# Patient Record
Sex: MALE | Race: WHITE | Employment: FULL TIME | URBAN - NONMETROPOLITAN AREA
[De-identification: names, ages, dates, MRNs, and addresses within clinical notes are randomized per-mention and may not be internally consistent; named-entity substitution may affect disease eponyms.]

---

## 2018-11-09 ENCOUNTER — HOSPITAL ENCOUNTER (OUTPATIENT)
Age: 53
Discharge: HOME | End: 2018-11-09
Payer: COMMERCIAL

## 2018-11-09 LAB
ALBUMIN SERPL-MCNC: 4.7 G/DL (ref 3.5–5.1)
ALP BLD-CCNC: 33 U/L (ref 38–126)
ALT SERPL-CCNC: 55 U/L (ref 11–66)
ANION GAP SERPL CALCULATED.3IONS-SCNC: 13 MEQ/L (ref 8–16)
AST SERPL-CCNC: 34 U/L (ref 5–40)
BASOPHILS # BLD: 0.6 %
BASOPHILS ABSOLUTE: 0 THOU/MM3 (ref 0–0.1)
BILIRUB SERPL-MCNC: 0.4 MG/DL (ref 0.3–1.2)
BUN BLDV-MCNC: 14 MG/DL (ref 7–22)
CALCIUM SERPL-MCNC: 9.5 MG/DL (ref 8.5–10.5)
CHLORIDE BLD-SCNC: 98 MEQ/L (ref 98–111)
CHOLESTEROL, TOTAL: 208 MG/DL (ref 100–199)
CO2: 28 MEQ/L (ref 23–33)
CREAT SERPL-MCNC: 0.9 MG/DL (ref 0.4–1.2)
EOSINOPHIL # BLD: 3.1 %
EOSINOPHILS ABSOLUTE: 0.2 THOU/MM3 (ref 0–0.4)
ERYTHROCYTE [DISTWIDTH] IN BLOOD BY AUTOMATED COUNT: 13 % (ref 11.5–14.5)
ERYTHROCYTE [DISTWIDTH] IN BLOOD BY AUTOMATED COUNT: 39.7 FL (ref 35–45)
GFR SERPL CREATININE-BSD FRML MDRD: 89 ML/MIN/1.73M2
GLUCOSE BLD-MCNC: 102 MG/DL (ref 70–108)
HCT VFR BLD CALC: 45.2 % (ref 42–52)
HDLC SERPL-MCNC: 43 MG/DL
HEMOGLOBIN: 15.1 GM/DL (ref 14–18)
IMMATURE GRANS (ABS): 0.03 THOU/MM3 (ref 0–0.07)
IMMATURE GRANULOCYTES: 0.5 %
LDL CHOLESTEROL CALCULATED: 122 MG/DL
LYMPHOCYTES # BLD: 27 %
LYMPHOCYTES ABSOLUTE: 1.7 THOU/MM3 (ref 1–4.8)
MCH RBC QN AUTO: 28.2 PG (ref 26–33)
MCHC RBC AUTO-ENTMCNC: 33.4 GM/DL (ref 32.2–35.5)
MCV RBC AUTO: 84.5 FL (ref 80–94)
MONOCYTES # BLD: 6.3 %
MONOCYTES ABSOLUTE: 0.4 THOU/MM3 (ref 0.4–1.3)
NUCLEATED RED BLOOD CELLS: 0 /100 WBC
PLATELET # BLD: 285 THOU/MM3 (ref 130–400)
PMV BLD AUTO: 10.4 FL (ref 9.4–12.4)
POTASSIUM SERPL-SCNC: 4.8 MEQ/L (ref 3.5–5.2)
RBC # BLD: 5.35 MILL/MM3 (ref 4.7–6.1)
SEG NEUTROPHILS: 62.5 %
SEGMENTED NEUTROPHILS ABSOLUTE COUNT: 4 THOU/MM3 (ref 1.8–7.7)
SODIUM BLD-SCNC: 139 MEQ/L (ref 135–145)
TOTAL PROTEIN: 7.8 G/DL (ref 6.1–8)
TRIGL SERPL-MCNC: 217 MG/DL (ref 0–199)
WBC # BLD: 6.4 THOU/MM3 (ref 4.8–10.8)

## 2018-11-09 PROCEDURE — 85025 COMPLETE CBC W/AUTO DIFF WBC: CPT

## 2018-11-09 PROCEDURE — 80053 COMPREHEN METABOLIC PANEL: CPT

## 2018-11-09 PROCEDURE — 80061 LIPID PANEL: CPT

## 2018-11-09 PROCEDURE — 36415 COLL VENOUS BLD VENIPUNCTURE: CPT

## 2021-02-26 ENCOUNTER — NURSE ONLY (OUTPATIENT)
Dept: LAB | Age: 56
End: 2021-02-26

## 2021-03-09 ENCOUNTER — NURSE ONLY (OUTPATIENT)
Dept: LAB | Age: 56
End: 2021-03-09

## 2021-09-07 ENCOUNTER — NURSE TRIAGE (OUTPATIENT)
Dept: OTHER | Facility: CLINIC | Age: 56
End: 2021-09-07

## 2021-09-10 ENCOUNTER — HOSPITAL ENCOUNTER (EMERGENCY)
Age: 56
Discharge: HOME | End: 2021-09-10
Payer: COMMERCIAL

## 2021-09-10 VITALS
SYSTOLIC BLOOD PRESSURE: 166 MMHG | OXYGEN SATURATION: 97 % | HEART RATE: 84 BPM | RESPIRATION RATE: 18 BRPM | DIASTOLIC BLOOD PRESSURE: 83 MMHG | TEMPERATURE: 100.6 F

## 2021-09-10 DIAGNOSIS — F32.0 CURRENT MILD EPISODE OF MAJOR DEPRESSIVE DISORDER WITHOUT PRIOR EPISODE (HCC): ICD-10-CM

## 2021-09-10 DIAGNOSIS — G47.00 INSOMNIA, UNSPECIFIED TYPE: ICD-10-CM

## 2021-09-10 DIAGNOSIS — R45.851 SUICIDAL THOUGHTS: Primary | ICD-10-CM

## 2021-09-10 PROCEDURE — 96372 THER/PROPH/DIAG INJ SC/IM: CPT

## 2021-09-10 PROCEDURE — 6360000002 HC RX W HCPCS: Performed by: PHYSICIAN ASSISTANT

## 2021-09-10 PROCEDURE — 99283 EMERGENCY DEPT VISIT LOW MDM: CPT

## 2021-09-10 RX ORDER — LORAZEPAM 1 MG/1
TABLET ORAL
Qty: 4 TABLET | Refills: 0 | Status: SHIPPED | OUTPATIENT
Start: 2021-09-10 | End: 2021-09-12

## 2021-09-10 RX ORDER — LORAZEPAM 2 MG/ML
2 INJECTION INTRAMUSCULAR ONCE
Status: COMPLETED | OUTPATIENT
Start: 2021-09-10 | End: 2021-09-10

## 2021-09-10 RX ORDER — LORAZEPAM 2 MG/ML
2 INJECTION INTRAMUSCULAR ONCE
Status: DISCONTINUED | OUTPATIENT
Start: 2021-09-10 | End: 2021-09-10

## 2021-09-10 RX ADMIN — LORAZEPAM 2 MG: 2 INJECTION, SOLUTION INTRAMUSCULAR; INTRAVENOUS at 21:28

## 2021-09-10 ASSESSMENT — PAIN DESCRIPTION - LOCATION: LOCATION: ABDOMEN

## 2021-09-10 ASSESSMENT — ENCOUNTER SYMPTOMS
RHINORRHEA: 0
VOMITING: 0
SHORTNESS OF BREATH: 0
NAUSEA: 1
COUGH: 1
ABDOMINAL PAIN: 0

## 2021-09-10 ASSESSMENT — PATIENT HEALTH QUESTIONNAIRE - PHQ9: SUM OF ALL RESPONSES TO PHQ QUESTIONS 1-9: 25

## 2021-09-10 ASSESSMENT — SLEEP AND FATIGUE QUESTIONNAIRES
AVERAGE NUMBER OF SLEEP HOURS: 2
SLEEP PATTERN: INSOMNIA;RESTLESSNESS;DIFFICULTY FALLING ASLEEP
DO YOU HAVE DIFFICULTY SLEEPING: YES
DIFFICULTY STAYING ASLEEP: YES
RESTFUL SLEEP: NO
DIFFICULTY FALLING ASLEEP: YES
DIFFICULTY ARISING: NO
DO YOU USE A SLEEP AID: YES

## 2021-09-10 ASSESSMENT — PAIN SCALES - GENERAL: PAINLEVEL_OUTOF10: 5

## 2021-09-10 NOTE — ED PROVIDER NOTES
[facility] EMERGENCY DEPT      CHIEF COMPLAINT       Chief Complaint   Patient presents with    Suicidal       Nurses Notes reviewed and I agree except as noted in the HPI. HISTORY OF PRESENT ILLNESS    Ben Perez is a 48 y.o. male who presents for suicidal ideation and depression. Patient reports that since his deviated septum surgery on 8-23 he has had an increase in his depression and anxiety affecting his sleep. He feels overwhelmed. Despite taking Vistaril 25 mg every 6 hours he still has unable to sleep. The patient does not have a plan of suicide but does not want to be here anymore. Patient does not have a history of prior attempts. He has been on Celexa for depression but feels it is ineffective. Patient endorses nausea, feet burning, 2 days of cough, intermittent fevers, feeling of thirst, and dry mouth. .  Patient was seen at [facility] for fever of 102 was diagnosed with dehydration and hyponatremia. The patient has followed up with his ENT surgeon out of [facility] and had a normal postop checkup. Patient has had 2 Covid swabs that were negative. REVIEW OF SYSTEMS     Review of Systems   Constitutional: Positive for fever. Negative for appetite change. HENT: Negative for congestion and rhinorrhea. Eyes: Negative for visual disturbance. Respiratory: Positive for cough. Negative for shortness of breath. Cardiovascular: Negative for chest pain. Gastrointestinal: Positive for nausea. Negative for abdominal pain and vomiting. Genitourinary: Negative for decreased urine volume. Musculoskeletal: Negative for gait problem and myalgias. Skin: Negative for rash and wound. Neurological: Negative for dizziness and headaches. Psychiatric/Behavioral: Positive for dysphoric mood, sleep disturbance and suicidal ideas. Negative for hallucinations and self-injury. The patient is nervous/anxious.          PAST MEDICAL HISTORY    has a past medical history of Anxiety, GERD are equal, round, and reactive to light. Neck:      Trachea: No tracheal deviation. Cardiovascular:      Rate and Rhythm: Normal rate and regular rhythm. Heart sounds: Normal heart sounds. No murmur heard. Pulmonary:      Effort: Pulmonary effort is normal. No respiratory distress. Breath sounds: Normal breath sounds. No stridor. No decreased breath sounds or wheezing. Abdominal:      General: There is no distension. Palpations: Abdomen is soft. Abdomen is not rigid. Tenderness: There is no abdominal tenderness. There is no guarding. Musculoskeletal:         General: Normal range of motion. Cervical back: Normal range of motion and neck supple. No rigidity. Lymphadenopathy:      Cervical: No cervical adenopathy. Skin:     General: Skin is warm and dry. Coloration: Skin is not pale. Findings: No rash. Neurological:      Mental Status: He is alert and oriented to person, place, and time. GCS: GCS eye subscore is 4. GCS verbal subscore is 5. GCS motor subscore is 6. Gait: Gait normal.      Comments: No gross deficits observed   Psychiatric:         Mood and Affect: Mood is anxious. Speech: Speech normal.         Behavior: Behavior normal. Behavior is cooperative. Thought Content: Thought content includes suicidal ideation. Comments: Excessive eye blinking         DIFFERENTIAL DIAGNOSIS:   Including but not limited to: Anxiety, depression, suicidality, viral URI, pneumonia, UTI    DIAGNOSTIC RESULTS     EKG: All EKG's are [facility] who either signs or Co-signs this chart in the absence of a cardiologist.  None    RADIOLOGY: non-plain film images(s) such as CT,Ultrasound and MRI are read by the radiologist.  Plain radiographic images are visualized and preliminarily interpreted by the emergency physician unless otherwise stated below.   No orders to display       LABS:   Labs Reviewed - No data to as of 9/10/2021  9:06 PM      START taking these medications    Details   LORazepam (ATIVAN) 1 MG tablet Take 1 to 2 tablets nightly as needed for sleep, Disp-4 tablet, R-0Print             (Please note that portions of this note were completed with a voice recognition program.  Efforts were made to edit the dictations but occasionally words are mis-transcribed.)    Gloria Miranda PA-C 09/11/21 5:31 AM    ADALBERTO Irwin PA-C  09/11/21 [id number]

## 2021-09-10 NOTE — ED TRIAGE NOTES
Pt presents to the ED through triage with c/c suicidal thoughts. Pt states that he has had these thoughts for a couple weeks. Pt states he has also been having trouble sleeping. Pt denies plan.  Placed in safe room 26 at this time

## 2021-09-10 NOTE — PROGRESS NOTES
Provisional Diagnosis:   Unspecified depressive disorder      Risk, Psychosocial and Contextual Factors:  Recent surgery, lack of sleep     Current MH Treatment: PCP      Present Suicidal Behavior:      Verbal:  Yes        Attempt: Denied     Access to Weapons:  Denied     Current Suicide Risk: Low, Moderate or High:   Low      Past Suicidal Behavior:       Verbal: Denied     Attempt:Denied     Self-Injurious/Self-Mutilation: Denied     Traumatic Event Within Past 2 Weeks:   Denied     Current Abuse: Denied     Legal: Denied     Violence: Denied     Protective Factors:  Family support     Housing:    Patient has his own home. Patient currently staying with his sister in law. CPAP/Oxygen/Ambulation Difficulties: Denied     Basic Vital Signs Normal?: Check with Patients Nurse prior to Calling Psychiatry    Critical Labs?: Check with Patients Nurse prior to Calling Psychiatry    Clinical Summary:      Patient is a 48year old male who presents to the ED voluntarily reporting suicdal thoughts of 'being better of dead'. Patient reports increased anxiety and lack of sleep after recent surgery. Patient reports he has slept 1-2 hours a night. Patient seeing PCP and follow-up surgeon. Patient currently takes 25mg of Visteral 6 times daily as needed. Patient denies suicidal plan or intention of acting. Patient lives alone, but since surgery has been staying with family. Homicidal thoughts and/or plans denied. No delusions noted. Hallucinations denied. AOD denied. Level of Care Disposition:      1912 Patient resting   2012 Patient resting. Sister-in-Law present   2012 Consulted with medical provider. Patient is medically cleared. Consulted with patients RN about abnormalities or medical concerns. No abnormalities or medical concerns noted. 2014 Consulted with Dr. Cecile Ruiz. Patient to be discharged follow up with PCP. 2056 Patient awaiting discharge.

## 2022-06-23 ENCOUNTER — OFFICE VISIT (OUTPATIENT)
Dept: ALLERGY | Age: 57
End: 2022-06-23
Payer: COMMERCIAL

## 2022-06-23 ENCOUNTER — NURSE ONLY (OUTPATIENT)
Dept: LAB | Age: 57
End: 2022-06-23

## 2022-06-23 VITALS
DIASTOLIC BLOOD PRESSURE: 90 MMHG | RESPIRATION RATE: 16 BRPM | BODY MASS INDEX: 29.17 KG/M2 | TEMPERATURE: 97.6 F | SYSTOLIC BLOOD PRESSURE: 130 MMHG | HEART RATE: 84 BPM | WEIGHT: 203.3 LBS | OXYGEN SATURATION: 94 %

## 2022-06-23 DIAGNOSIS — J34.89 SORE NOSE: ICD-10-CM

## 2022-06-23 DIAGNOSIS — K11.7 XEROSTOMIA: ICD-10-CM

## 2022-06-23 DIAGNOSIS — J30.9 ALLERGIC SINUSITIS: ICD-10-CM

## 2022-06-23 DIAGNOSIS — Z91.018 FOOD ALLERGY: Primary | ICD-10-CM

## 2022-06-23 DIAGNOSIS — Z91.018 FOOD ALLERGY: ICD-10-CM

## 2022-06-23 LAB
BASOPHILS # BLD: 0.7 %
BASOPHILS ABSOLUTE: 0 THOU/MM3 (ref 0–0.1)
EOSINOPHIL # BLD: 10.2 %
EOSINOPHILS ABSOLUTE: 0.6 THOU/MM3 (ref 0–0.4)
ERYTHROCYTE [DISTWIDTH] IN BLOOD BY AUTOMATED COUNT: 13.2 % (ref 11.5–14.5)
ERYTHROCYTE [DISTWIDTH] IN BLOOD BY AUTOMATED COUNT: 43.2 FL (ref 35–45)
HCT VFR BLD CALC: 47.6 % (ref 42–52)
HEMOGLOBIN: 15.2 GM/DL (ref 14–18)
IMMATURE GRANS (ABS): 0.01 THOU/MM3 (ref 0–0.07)
IMMATURE GRANULOCYTES: 0.2 %
LYMPHOCYTES # BLD: 16.6 %
LYMPHOCYTES ABSOLUTE: 1 THOU/MM3 (ref 1–4.8)
MCH RBC QN AUTO: 28.8 PG (ref 26–33)
MCHC RBC AUTO-ENTMCNC: 31.9 GM/DL (ref 32.2–35.5)
MCV RBC AUTO: 90.2 FL (ref 80–94)
MONOCYTES # BLD: 5.9 %
MONOCYTES ABSOLUTE: 0.3 THOU/MM3 (ref 0.4–1.3)
NUCLEATED RED BLOOD CELLS: 0 /100 WBC
PLATELET # BLD: 249 THOU/MM3 (ref 130–400)
PMV BLD AUTO: 11.2 FL (ref 9.4–12.4)
RBC # BLD: 5.28 MILL/MM3 (ref 4.7–6.1)
SEG NEUTROPHILS: 66.4 %
SEGMENTED NEUTROPHILS ABSOLUTE COUNT: 3.9 THOU/MM3 (ref 1.8–7.7)
WBC # BLD: 5.9 THOU/MM3 (ref 4.8–10.8)

## 2022-06-23 PROCEDURE — 99204 OFFICE O/P NEW MOD 45 MIN: CPT | Performed by: NURSE PRACTITIONER

## 2022-06-23 RX ORDER — HYDROCORTISONE 20 MG/1
TABLET ORAL
COMMUNITY
Start: 2021-09-08 | End: 2022-09-28 | Stop reason: CLARIF

## 2022-06-23 RX ORDER — TRAZODONE HYDROCHLORIDE 50 MG/1
TABLET ORAL
COMMUNITY
Start: 2022-05-10 | End: 2022-09-28 | Stop reason: CLARIF

## 2022-06-23 ASSESSMENT — ENCOUNTER SYMPTOMS
NAUSEA: 0
EYE REDNESS: 1
VOICE CHANGE: 0
COLOR CHANGE: 0
SINUS PRESSURE: 0
SORE THROAT: 0
EYE ITCHING: 1
DIARRHEA: 0
ABDOMINAL PAIN: 0
CHOKING: 0
CHEST TIGHTNESS: 0
COUGH: 0
RHINORRHEA: 1
SHORTNESS OF BREATH: 0
TROUBLE SWALLOWING: 0
APNEA: 0
VOMITING: 0
FACIAL SWELLING: 0
STRIDOR: 0
WHEEZING: 0

## 2022-06-23 NOTE — PATIENT INSTRUCTIONS
STOP THE FOLLOWING MEDICATIONS (IF TAKING) ONE WEEK PRIOR TO ALLERGY TESTIN. ANTIHISTAMINES - ZYRTEC (CETIRIZINE), CLARITIN (LORATADINE), XYZAL (LEVOCETIRIZINE), BENADRYL (DIPHENHYDRAMINE), HYDROXYZINE, ALLEGRA (FEXOFENADINE)  2. STOP NASAL SPRAYS/RINSES - Danetta Merle, NASONEX, ASTELIN, ANTIVERT (Yves Forte  3. STOP STOMACH MEDICATIONS - PEPCID  4. STOP SINGULAIR OR MONTELUKAST  5. IF POSSIBLE HOLD INHALERS THE DAY OF TESTING BUT BRING WITH YOU TO USE AFTER THE ALLERGY TESTING  6. ELAVIL (AMITRIPTYLINE) - MUST DISCUSS WITH VELIA RETANA OR PCP PRIOR TO HOLDING  7.  RECOMMEND STOPPING HYDROCORTISONE IF HE CAN TOLERATE. PATIENT STATES HAS ONLY RECENTLY STARTED MEDICATION. IF YOU ARE PLACED ON ANY NEW MEDICATIONS BETWEEN NOW AND THE TIME OF YOUR ALLERGY TESTING BY ANY OTHER PROVIDER CALL THE OFFICE TO VERIFY IF THIS WILL INTERFERE WITH ALLERGY TESTING      IF ANY LABS ARE DONE, YOU WILL BE NOTIFIED IN OF ABNORMALITIES THAT ARE CONCERNING TO THE PROVIDER. OTHERWISE LABS WILL BE REVIEWED AT YOUR NEXT VISIT.

## 2022-06-23 NOTE — PROGRESS NOTES
Allergy & Asthma   [address] [facility], [address]  Ph:[phone]  Fax: [phone]          Chief Complaint:   Chief Complaint   Patient presents with    New Patient     pt is here for allergic rhinitis       HISTORY OF PRESENT ILLNESS: NEW PATIENT TO PRACTICE   55year old male complains of allergic rhinitis and food allergies. Patient practices holistic medication and does not want to take any over-the-counter medications including antihistamines and nasal topical steroid sprays. Food - bloating and constipation. Thinks is related to gluten and dairy. Denies vomiting. Has some nausea at times. He states he has a leaky gut but was not diagnosed by a GI doctor. His GI doctor diagnosed him with IBS. This occurred a few years ago. Onset has been the last several years. Moderate in severity. Avoids gluten and dairy. Has lost 30 lbs related to changing his diet. Allergic rhinitis - headaches, runny nose, sores beneath his nose, has had allergies all his life. Patient complains of eyes that itch swell, burn, tear and has discharge at times. He states he has a runny nose that has sores in it. He states at times he will get yellow-green drainage from his nose. His throat stays sore from postnasal drip, he does frequent throat clearing and has hoarseness. He does get headaches but states that since he changed his diet to be gluten free his headaches are a lot less. He has eczema, at times he gets rashes on the scalp. Patient states allergies occur all year. Dampness, fragrances and odors, weeds, trees, dust, mold and milk makes his allergies worse. He does get sinus infections occasionally. He states in 2021 he had sinus surgery Dr. Radha Dutton corrected a deviated septum. He takes hydrocortisone from his naturopathic doctor for inflammation. He dose use several OTC \"natural\" medications. Does not use nasal sprays and does not use allergy medication.   Severity is moderate in severity. He does not like to use medication as he feels this chases the symptoms and they have a lot of side effects. Has had allergies all his life. He does \"ozone therapy\" for sinuses which cleans them out. Dr Mariann Winters recently corrected his deviated septum in summer 2021. Has heavy metals. Does celation therapy with Dr Amanda Tate. [facility] [place]    By trade patient is an . He does not smoke. He does not use recreational drugs. He does not consume alcohol. He does have a cat and a dog. He has had pets all his life. His home is partially carpeted. He sleeps on a standard mattress. He is alert lived in his current home for [place] years in [place] all his life. Review of Systems:  Review of Systems   Constitutional: Negative for activity change, appetite change, chills, diaphoresis, fatigue, fever and unexpected weight change. Reports changes in his vision. He has seen an ophthalmologist.   HENT: Positive for postnasal drip and rhinorrhea. Negative for congestion, dental problem, ear discharge, ear pain, facial swelling, hearing loss, mouth sores, nosebleeds, sinus pressure, sneezing, sore throat, tinnitus, trouble swallowing and voice change. Sore nose   Eyes: Positive for redness and itching. Negative for visual disturbance. Respiratory: Negative for apnea, cough, choking, chest tightness, shortness of breath, wheezing and stridor. Cardiovascular: Negative for chest pain, palpitations and leg swelling. Gastrointestinal: Negative for abdominal pain, diarrhea, nausea and vomiting. Endocrine: Negative for cold intolerance, heat intolerance, polydipsia and polyuria. Genitourinary: Negative for difficulty urinating, dysuria, enuresis, hematuria, penile discharge, penile pain, penile swelling, scrotal swelling, testicular pain and urgency. Musculoskeletal: Positive for arthralgias and myalgias. Negative for gait problem, neck pain and neck stiffness.    Skin: Negative for color change, rash and wound. Allergic/Immunologic: Positive for environmental allergies and food allergies. Negative for immunocompromised state. Neurological: Negative for dizziness, seizures, syncope, facial asymmetry, speech difficulty, light-headedness and headaches. Hematological: Negative for adenopathy. Does not bruise/bleed easily. Psychiatric/Behavioral: Negative for confusion, sleep disturbance and suicidal ideas. The patient is not nervous/anxious. All other systems reviewed and are negative. Past Medical History:    Past Medical History:   Diagnosis Date    Anxiety     GERD (gastroesophageal reflux disease)     Hyperlipidemia     Patellar bursitis of left knee 8/8/2014    Psychiatric problem        Past Surgical History:    Past Surgical History:   Procedure Laterality Date    COLONOSCOPY  unsure    ENDOSCOPY, COLON, DIAGNOSTIC  unsure       Family History:   Family History   Problem Relation Age of Onset    Heart Disease Father        Social History:   Social History     Tobacco Use    Smoking status: Never Smoker    Smokeless tobacco: Never Used   Substance Use Topics    Alcohol use: No        Allergies:    No Known Allergies    Current Medications:   Prior to Visit Medications    Medication Sig Taking? Authorizing Provider   traZODone (DESYREL) 50 MG tablet take 1 tablet by mouth at bedtime if needed for sleep / insomnia Yes Historical Provider, MD   hydrocortisone (CORTEF) 20 MG tablet Take by mouth Yes Historical Provider, MD   Ascorbic Acid (VITAMIN C) 500 MG tablet Take 500 mg by mouth 4 times daily. Yes Historical Provider, MD   Vitamin D (CHOLECALCIFEROL) 1000 UNITS CAPS capsule Take 1,000 Units by mouth 2 times daily. Yes Historical Provider, MD   OLIVE LEAF PO Take 3 tablets by mouth 3 times daily. Yes Historical Provider, MD   GARLIC PO Take 7,520 mg by mouth three times daily. Yes Historical Provider, MD   NONFORMULARY 200 mg 2 times daily.  Yes placed or performed during the hospital encounter of 11/09/18   Comprehensive Metabolic Panel   Result Value Ref Range    Glucose 102 70 - 108 mg/dL    CREATININE 0.9 0.4 - 1.2 mg/dL    BUN 14 7 - 22 mg/dL    Sodium 139 135 - 145 meq/L    Potassium 4.8 3.5 - 5.2 meq/L    Chloride 98 98 - 111 meq/L    CO2 28 23 - 33 meq/L    Calcium 9.5 8.5 - 10.5 mg/dL    AST 34 5 - 40 U/L    Alkaline Phosphatase 33 (L) 38 - 126 U/L    Total Protein 7.8 6.1 - 8.0 g/dL    Albumin 4.7 3.5 - 5.1 g/dL    Total Bilirubin 0.4 0.3 - 1.2 mg/dL    ALT 55 11 - 66 U/L   Lipid Panel   Result Value Ref Range    Cholesterol, Total 208 (H) 100 - 199 mg/dL    Triglycerides 217 (H) 0 - 199 mg/dL    HDL 43 mg/dL    LDL Calculated 122 mg/dL   CBC Auto Differential   Result Value Ref Range    WBC 6.4 4.8 - 10.8 thou/mm3    RBC 5.35 4.70 - 6.10 mill/mm3    Hemoglobin 15.1 14.0 - 18.0 gm/dl    Hematocrit 45.2 42.0 - 52.0 %    MCV 84.5 80.0 - 94.0 fL    MCH 28.2 26.0 - 33.0 pg    MCHC 33.4 32.2 - 35.5 gm/dl    RDW-CV 13.0 11.5 - 14.5 %    RDW-SD 39.7 35.0 - 45.0 fL    Platelets 883 212 - 611 thou/mm3    MPV 10.4 9.4 - 12.4 fL    Seg Neutrophils 62.5 %    Lymphocytes 27.0 %    Monocytes 6.3 %    Eosinophils 3.1 %    Basophils 0.6 %    Immature Granulocytes 0.5 %    Segs Absolute 4.0 1.8 - 7.7 thou/mm3    Lymphocytes Absolute 1.7 1.0 - 4.8 thou/mm3    Monocytes Absolute 0.4 0.4 - 1.3 thou/mm3    Eosinophils Absolute 0.2 0.0 - 0.4 thou/mm3    Basophils Absolute 0.0 0.0 - 0.1 thou/mm3    Immature Grans (Abs) 0.03 0.00 - 0.07 thou/mm3    nRBC 0 /100 wbc   Anion Gap   Result Value Ref Range    Anion Gap 13.0 8.0 - 16.0 meq/L   Glomerular Filtration Rate, Estimated   Result Value Ref Range    Est, Glom Filt Rate 89 (A) ml/min/1.73m2         Assessment/Plan   Leslie Nolasco was seen today for new patient. Diagnoses and all orders for this visit:    Food allergy  -     CBC with Auto Differential; Future  -     IgA; Future  -     IgE; Future  -     IgG;  Future  - IgM; Future  -     Strongyloides Ab, IgG; Future  -     Miscellaneous Sendout; Future  -     Celiac Reflex Panel; Future  -     Allergen Gluten IgE; Future  -     Barley, whole grain IgE; Future  -     Corn IgE; Future  -     Allergen Gluten IgE; Future  -     Allergen Oat IgE; Future  -     Allergen Rice IgE; Future  -     Rye IgE; Future  -     Wheat IgE; Future  -     Allergen, Food, Comprehensive Profile 1; Future  -     Endomysial Antibody, IgA; Future  -     Gliadin Deamidated Peptide Antibody, IgA; Future  -     Gliadin Deamidated Peptide Antibody, IgG; Future  -     Tissue Transglutaminase, IgA; Future  -     Miscellaneous Sendout; Future  -     Allergen Tomato IgE; Future  -     Allergen, Food, Egg Yolk; Future  -     Allergen Egg White IgE; Future  -     Miscellaneous Sendout; Future  -     Allergen Profile, Vegetable I; Future    Allergic sinusitis  -     CBC with Auto Differential; Future  -     IgA; Future  -     IgE; Future  -     IgG; Future  -     IgM; Future        Return in about 3 months (around 9/23/2022) for Allergy Testing. Spent 30 minutes of face-to-face time with the patient with well more than half of the visit being dedicated to the discussion of the various symptom problems, provided education of medications and disease process, as well as discussion of a therapeutic plan for each.     Office to get medical records from previous provider and/or PCP      (Please note that portions of this note may have been completed with a voice recognition program.  Efforts were made to edit the dictation but occasionally words are mis-transcribed.)        Signed:   ENRIQUE Yan CNP  6/23/2022  10:09 AM

## 2022-06-24 LAB
CELIAC SEROLOGY: NORMAL
CYCLIC CITRULLINATED PEPTIDE ANTIBODY IGG: 1.7 U/ML (ref 0–7)
GLIADIN PEPTIDE IGA: 1.6 U/ML
GLIADIN PEPTIDE IGG: < 0.4 U/ML
IGA: 169 MG/DL (ref 70–400)
IGE: 85 IU/ML
IGG: 940 MG/DL (ref 700–1600)
IGM: 38 MG/DL (ref 40–230)
TISSUE TRANSGLUTAMINASE IGA: 0.6 U/ML

## 2022-06-25 LAB
ANA SCREEN: DETECTED
ENDOMYSIAL IGA ANTIBODY: NORMAL

## 2022-06-26 LAB
ANTINUCLEAR ANTIBODY, HEP-2, IGG: NORMAL
MYELOPEROXIDASE AB, IGG: 0 AU/ML (ref 0–19)
SERINE PROTEASE 3, IGG: 2 AU/ML (ref 0–19)
STRONGYLOIDES ANTIBODY: NORMAL

## 2022-06-27 ENCOUNTER — TELEPHONE (OUTPATIENT)
Dept: ALLERGY | Age: 57
End: 2022-06-27

## 2022-06-27 DIAGNOSIS — R89.8 EOSINOPHILS INCREASED: Primary | ICD-10-CM

## 2022-06-27 DIAGNOSIS — J34.89 SORE IN NOSE: ICD-10-CM

## 2022-06-27 DIAGNOSIS — R21 RASH: ICD-10-CM

## 2022-06-27 NOTE — PROGRESS NOTES
Patient being referred to rheumatology, sores in nose and intermittent scalp rash. 1020 eos. Although EOS are not elevated enough for DX of hypereosinophilia syndrome, I question the possibility of this DX.

## 2022-06-27 NOTE — PROGRESS NOTES
Per referral coordinator the following labs need to be ordered. CMP, ESR, CRP. Alistair Muhammad will need to follow up on labs she states must be ordered.

## 2022-06-27 NOTE — TELEPHONE ENCOUNTER
----- Message from ENRIQUE Agudelo CNP sent at 6/27/2022  9:19 AM EDT -----  Regarding: ref to rheumatology  Please call patient and let him know that I am referring him to rheumatology. His RALPH came back positive.   Because of his intermittent scalp rash, sores in his nose, and a lab test eosinophils that is elevated this could be suggestive of a syndrome that is autoimmune in nature

## 2022-06-28 LAB — MISC REFERENCE: NORMAL

## 2022-06-28 NOTE — TELEPHONE ENCOUNTER
Received call back from the pt that he received my message. Pt verbalized understanding and thanked me.

## 2022-07-01 LAB — COMPLEMENT ACTIVITY, ALTERNATIVE PATHWAY: 110 % NORMAL

## 2022-07-05 ENCOUNTER — NURSE ONLY (OUTPATIENT)
Dept: LAB | Age: 57
End: 2022-07-05

## 2022-07-05 DIAGNOSIS — R89.8 EOSINOPHILS INCREASED: ICD-10-CM

## 2022-07-05 LAB
ALBUMIN SERPL-MCNC: 4.8 G/DL (ref 3.5–5.1)
ALP BLD-CCNC: 28 U/L (ref 38–126)
ALT SERPL-CCNC: 12 U/L (ref 11–66)
ANION GAP SERPL CALCULATED.3IONS-SCNC: 11 MEQ/L (ref 8–16)
AST SERPL-CCNC: 12 U/L (ref 5–40)
BILIRUB SERPL-MCNC: 0.2 MG/DL (ref 0.3–1.2)
BUN BLDV-MCNC: 15 MG/DL (ref 7–22)
C-REACTIVE PROTEIN: < 0.3 MG/DL (ref 0–1)
CALCIUM SERPL-MCNC: 9.7 MG/DL (ref 8.5–10.5)
CHLORIDE BLD-SCNC: 100 MEQ/L (ref 98–111)
CO2: 28 MEQ/L (ref 23–33)
CREAT SERPL-MCNC: 1 MG/DL (ref 0.4–1.2)
GFR SERPL CREATININE-BSD FRML MDRD: 77 ML/MIN/1.73M2
GLUCOSE BLD-MCNC: 97 MG/DL (ref 70–108)
POTASSIUM SERPL-SCNC: 4.6 MEQ/L (ref 3.5–5.2)
SEDIMENTATION RATE, ERYTHROCYTE: 3 MM/HR (ref 0–10)
SODIUM BLD-SCNC: 139 MEQ/L (ref 135–145)
TOTAL PROTEIN: 6.6 G/DL (ref 6.1–8)

## 2022-07-18 ENCOUNTER — NURSE ONLY (OUTPATIENT)
Dept: LAB | Age: 57
End: 2022-07-18

## 2022-07-18 LAB
ALBUMIN SERPL-MCNC: 4.8 G/DL (ref 3.5–5.1)
ALP BLD-CCNC: 25 U/L (ref 38–126)
ALT SERPL-CCNC: 12 U/L (ref 11–66)
AMYLASE: 55 U/L (ref 20–104)
ANION GAP SERPL CALCULATED.3IONS-SCNC: 12 MEQ/L (ref 8–16)
AST SERPL-CCNC: 12 U/L (ref 5–40)
BASOPHILS # BLD: 0.5 %
BASOPHILS ABSOLUTE: 0 THOU/MM3 (ref 0–0.1)
BILIRUB SERPL-MCNC: 0.2 MG/DL (ref 0.3–1.2)
BUN BLDV-MCNC: 13 MG/DL (ref 7–22)
CALCIUM SERPL-MCNC: 9.7 MG/DL (ref 8.5–10.5)
CHLORIDE BLD-SCNC: 101 MEQ/L (ref 98–111)
CO2: 25 MEQ/L (ref 23–33)
CREAT SERPL-MCNC: 0.9 MG/DL (ref 0.4–1.2)
EOSINOPHIL # BLD: 4.2 %
EOSINOPHILS ABSOLUTE: 0.3 THOU/MM3 (ref 0–0.4)
ERYTHROCYTE [DISTWIDTH] IN BLOOD BY AUTOMATED COUNT: 13.1 % (ref 11.5–14.5)
ERYTHROCYTE [DISTWIDTH] IN BLOOD BY AUTOMATED COUNT: 42.6 FL (ref 35–45)
GFR SERPL CREATININE-BSD FRML MDRD: 87 ML/MIN/1.73M2
GLUCOSE BLD-MCNC: 99 MG/DL (ref 70–108)
HCT VFR BLD CALC: 42.7 % (ref 42–52)
HEMOGLOBIN: 13.9 GM/DL (ref 14–18)
IMMATURE GRANS (ABS): 0.01 THOU/MM3 (ref 0–0.07)
IMMATURE GRANULOCYTES: 0.2 %
LIPASE: 33.4 U/L (ref 5.6–51.3)
LYMPHOCYTES # BLD: 20.1 %
LYMPHOCYTES ABSOLUTE: 1.2 THOU/MM3 (ref 1–4.8)
MCH RBC QN AUTO: 29.1 PG (ref 26–33)
MCHC RBC AUTO-ENTMCNC: 32.6 GM/DL (ref 32.2–35.5)
MCV RBC AUTO: 89.5 FL (ref 80–94)
MONOCYTES # BLD: 5.2 %
MONOCYTES ABSOLUTE: 0.3 THOU/MM3 (ref 0.4–1.3)
NUCLEATED RED BLOOD CELLS: 0 /100 WBC
PLATELET # BLD: 283 THOU/MM3 (ref 130–400)
PMV BLD AUTO: 11 FL (ref 9.4–12.4)
POTASSIUM SERPL-SCNC: 4.4 MEQ/L (ref 3.5–5.2)
RBC # BLD: 4.77 MILL/MM3 (ref 4.7–6.1)
SEG NEUTROPHILS: 69.8 %
SEGMENTED NEUTROPHILS ABSOLUTE COUNT: 4.3 THOU/MM3 (ref 1.8–7.7)
SODIUM BLD-SCNC: 138 MEQ/L (ref 135–145)
TOTAL PROTEIN: 6.7 G/DL (ref 6.1–8)
TSH SERPL DL<=0.05 MIU/L-ACNC: 1.64 UIU/ML (ref 0.4–4.2)
WBC # BLD: 6.2 THOU/MM3 (ref 4.8–10.8)

## 2022-07-21 LAB
TISSUE TRANSGLUTAMINASE IGA: 0.6 U/ML
TTG, IGG: 0.9 U/ML

## 2022-07-23 LAB — VITAMIN B6: 1280.4 NMOL/L (ref 20–125)

## 2022-08-28 ENCOUNTER — NURSE TRIAGE (OUTPATIENT)
Dept: OTHER | Facility: CLINIC | Age: 57
End: 2022-08-28

## 2022-08-29 NOTE — TELEPHONE ENCOUNTER
Pt had his antidepressant changed this past week and is having side effects. MD prescribed medication to help with these side effects, pt doesn't believe these medications will help. RN advised that if these medications dont help, he can always go to ED, if he believes it is necessary. RN advised him to call his prescribing physician in the morning to discuss these side effects.      Reason for Disposition   Caller has medicine question only, adult not sick, AND triager answers question    Protocols used: Medication Question Call-ADULT-

## 2022-09-27 ENCOUNTER — APPOINTMENT (OUTPATIENT)
Dept: CT IMAGING | Age: 57
DRG: 439 | End: 2022-09-27
Payer: COMMERCIAL

## 2022-09-27 ENCOUNTER — HOSPITAL ENCOUNTER (INPATIENT)
Age: 57
LOS: 2 days | Discharge: HOME | DRG: 439 | End: 2022-09-30
Attending: STUDENT IN AN ORGANIZED HEALTH CARE EDUCATION/TRAINING PROGRAM | Admitting: STUDENT IN AN ORGANIZED HEALTH CARE EDUCATION/TRAINING PROGRAM
Payer: COMMERCIAL

## 2022-09-27 DIAGNOSIS — R63.1 POLYDIPSIA: ICD-10-CM

## 2022-09-27 DIAGNOSIS — R20.2 PARESTHESIAS: ICD-10-CM

## 2022-09-27 DIAGNOSIS — R45.851 DEPRESSION WITH SUICIDAL IDEATION: ICD-10-CM

## 2022-09-27 DIAGNOSIS — K85.90 ACUTE PANCREATITIS WITHOUT INFECTION OR NECROSIS, UNSPECIFIED PANCREATITIS TYPE: Primary | ICD-10-CM

## 2022-09-27 DIAGNOSIS — F41.1 GAD (GENERALIZED ANXIETY DISORDER): ICD-10-CM

## 2022-09-27 DIAGNOSIS — F32.A DEPRESSION WITH SUICIDAL IDEATION: ICD-10-CM

## 2022-09-27 LAB
ACETAMINOPHEN LEVEL: < 5 UG/ML (ref 0–20)
ALBUMIN SERPL-MCNC: 4.1 G/DL (ref 3.5–5.1)
ALP BLD-CCNC: 29 U/L (ref 38–126)
ALT SERPL-CCNC: 13 U/L (ref 11–66)
ANION GAP SERPL CALCULATED.3IONS-SCNC: 10 MEQ/L (ref 8–16)
AST SERPL-CCNC: 12 U/L (ref 5–40)
BASOPHILS # BLD: 0.6 %
BASOPHILS ABSOLUTE: 0 THOU/MM3 (ref 0–0.1)
BILIRUB SERPL-MCNC: 0.3 MG/DL (ref 0.3–1.2)
BUN BLDV-MCNC: 16 MG/DL (ref 7–22)
CALCIUM SERPL-MCNC: 9.1 MG/DL (ref 8.5–10.5)
CHLORIDE BLD-SCNC: 101 MEQ/L (ref 98–111)
CO2: 26 MEQ/L (ref 23–33)
CREAT SERPL-MCNC: 0.8 MG/DL (ref 0.4–1.2)
EOSINOPHIL # BLD: 8 %
EOSINOPHILS ABSOLUTE: 0.6 THOU/MM3 (ref 0–0.4)
ERYTHROCYTE [DISTWIDTH] IN BLOOD BY AUTOMATED COUNT: 12.2 % (ref 11.5–14.5)
ERYTHROCYTE [DISTWIDTH] IN BLOOD BY AUTOMATED COUNT: 38.9 FL (ref 35–45)
ETHYL ALCOHOL, SERUM: < 0.01 %
GFR SERPL CREATININE-BSD FRML MDRD: > 90 ML/MIN/1.73M2
GLUCOSE BLD-MCNC: 87 MG/DL (ref 70–108)
HCT VFR BLD CALC: 42.7 % (ref 42–52)
HEMOGLOBIN: 14.5 GM/DL (ref 14–18)
IMMATURE GRANS (ABS): 0.02 THOU/MM3 (ref 0–0.07)
IMMATURE GRANULOCYTES: 0.2 %
LIPASE: 505 U/L (ref 5.6–51.3)
LYMPHOCYTES # BLD: 27.3 %
LYMPHOCYTES ABSOLUTE: 2.2 THOU/MM3 (ref 1–4.8)
MCH RBC QN AUTO: 29.8 PG (ref 26–33)
MCHC RBC AUTO-ENTMCNC: 34 GM/DL (ref 32.2–35.5)
MCV RBC AUTO: 87.7 FL (ref 80–94)
MONOCYTES # BLD: 6.7 %
MONOCYTES ABSOLUTE: 0.5 THOU/MM3 (ref 0.4–1.3)
NUCLEATED RED BLOOD CELLS: 0 /100 WBC
OSMOLALITY CALCULATION: 274.4 MOSMOL/KG (ref 275–300)
PLATELET # BLD: 247 THOU/MM3 (ref 130–400)
PMV BLD AUTO: 10.7 FL (ref 9.4–12.4)
POTASSIUM SERPL-SCNC: 4.2 MEQ/L (ref 3.5–5.2)
RBC # BLD: 4.87 MILL/MM3 (ref 4.7–6.1)
SALICYLATE, SERUM: < 0.3 MG/DL (ref 2–10)
SEG NEUTROPHILS: 57.2 %
SEGMENTED NEUTROPHILS ABSOLUTE COUNT: 4.6 THOU/MM3 (ref 1.8–7.7)
SODIUM BLD-SCNC: 137 MEQ/L (ref 135–145)
TOTAL PROTEIN: 6.4 G/DL (ref 6.1–8)
WBC # BLD: 8.1 THOU/MM3 (ref 4.8–10.8)

## 2022-09-27 PROCEDURE — 99285 EMERGENCY DEPT VISIT HI MDM: CPT

## 2022-09-27 PROCEDURE — 82077 ASSAY SPEC XCP UR&BREATH IA: CPT

## 2022-09-27 PROCEDURE — 6360000004 HC RX CONTRAST MEDICATION: Performed by: EMERGENCY MEDICINE

## 2022-09-27 PROCEDURE — 80053 COMPREHEN METABOLIC PANEL: CPT

## 2022-09-27 PROCEDURE — 80307 DRUG TEST PRSMV CHEM ANLYZR: CPT

## 2022-09-27 PROCEDURE — 36415 COLL VENOUS BLD VENIPUNCTURE: CPT

## 2022-09-27 PROCEDURE — 81003 URINALYSIS AUTO W/O SCOPE: CPT

## 2022-09-27 PROCEDURE — 85025 COMPLETE CBC W/AUTO DIFF WBC: CPT

## 2022-09-27 PROCEDURE — 83690 ASSAY OF LIPASE: CPT

## 2022-09-27 PROCEDURE — 83036 HEMOGLOBIN GLYCOSYLATED A1C: CPT

## 2022-09-27 PROCEDURE — 74177 CT ABD & PELVIS W/CONTRAST: CPT

## 2022-09-27 PROCEDURE — 80143 DRUG ASSAY ACETAMINOPHEN: CPT

## 2022-09-27 PROCEDURE — 80179 DRUG ASSAY SALICYLATE: CPT

## 2022-09-27 RX ADMIN — IOPAMIDOL 80 ML: 755 INJECTION, SOLUTION INTRAVENOUS at 23:23

## 2022-09-27 ASSESSMENT — LIFESTYLE VARIABLES
HOW OFTEN DO YOU HAVE A DRINK CONTAINING ALCOHOL: NEVER
HOW MANY STANDARD DRINKS CONTAINING ALCOHOL DO YOU HAVE ON A TYPICAL DAY: PATIENT DOES NOT DRINK

## 2022-09-27 ASSESSMENT — SLEEP AND FATIGUE QUESTIONNAIRES
DO YOU USE A SLEEP AID: YES
DO YOU HAVE DIFFICULTY SLEEPING: YES
AVERAGE NUMBER OF SLEEP HOURS: 4
SLEEP PATTERN: EARLY AWAKENING

## 2022-09-27 ASSESSMENT — PATIENT HEALTH QUESTIONNAIRE - PHQ9: SUM OF ALL RESPONSES TO PHQ QUESTIONS 1-9: 20

## 2022-09-27 NOTE — ED NOTES
Pt to er. Pt states he is feeling depressed and has some anxiety. States see someone at [facility] for it. Denies any SI or HI.       Toney Stockton RN  09/27/22 0144

## 2022-09-28 ENCOUNTER — APPOINTMENT (OUTPATIENT)
Dept: ULTRASOUND IMAGING | Age: 57
DRG: 439 | End: 2022-09-28
Payer: COMMERCIAL

## 2022-09-28 PROBLEM — K85.90 PANCREATITIS, UNSPECIFIED PANCREATITIS TYPE: Status: ACTIVE | Noted: 2022-09-28

## 2022-09-28 PROBLEM — F41.0 PANIC DISORDER: Status: ACTIVE | Noted: 2022-09-28

## 2022-09-28 LAB
AMPHETAMINE+METHAMPHETAMINE URINE SCREEN: NEGATIVE
ANION GAP SERPL CALCULATED.3IONS-SCNC: 10 MEQ/L (ref 8–16)
AVERAGE GLUCOSE: 114 MG/DL (ref 70–126)
BARBITURATE QUANTITATIVE URINE: NEGATIVE
BASOPHILS # BLD: 0.8 %
BASOPHILS ABSOLUTE: 0.1 THOU/MM3 (ref 0–0.1)
BENZODIAZEPINE QUANTITATIVE URINE: NEGATIVE
BILIRUBIN URINE: NEGATIVE
BLOOD, URINE: NEGATIVE
BUN BLDV-MCNC: 13 MG/DL (ref 7–22)
CALCIUM SERPL-MCNC: 8.9 MG/DL (ref 8.5–10.5)
CANNABINOID QUANTITATIVE URINE: NEGATIVE
CHARACTER, URINE: CLEAR
CHLORIDE BLD-SCNC: 102 MEQ/L (ref 98–111)
CO2: 24 MEQ/L (ref 23–33)
COCAINE METABOLITE QUANTITATIVE URINE: NEGATIVE
COLOR: YELLOW
CREAT SERPL-MCNC: 0.8 MG/DL (ref 0.4–1.2)
EOSINOPHIL # BLD: 9.6 %
EOSINOPHILS ABSOLUTE: 0.6 THOU/MM3 (ref 0–0.4)
ERYTHROCYTE [DISTWIDTH] IN BLOOD BY AUTOMATED COUNT: 12.2 % (ref 11.5–14.5)
ERYTHROCYTE [DISTWIDTH] IN BLOOD BY AUTOMATED COUNT: 39.5 FL (ref 35–45)
GFR SERPL CREATININE-BSD FRML MDRD: > 90 ML/MIN/1.73M2
GLUCOSE BLD-MCNC: 87 MG/DL (ref 70–108)
GLUCOSE URINE: NEGATIVE MG/DL
HBA1C MFR BLD: 5.8 % (ref 4.4–6.4)
HCT VFR BLD CALC: 44 % (ref 42–52)
HEMOGLOBIN: 14.8 GM/DL (ref 14–18)
IMMATURE GRANS (ABS): 0.02 THOU/MM3 (ref 0–0.07)
IMMATURE GRANULOCYTES: 0.3 %
KETONES, URINE: NEGATIVE
LEUKOCYTE ESTERASE, URINE: NEGATIVE
LYMPHOCYTES # BLD: 32.3 %
LYMPHOCYTES ABSOLUTE: 2.2 THOU/MM3 (ref 1–4.8)
MCH RBC QN AUTO: 29.8 PG (ref 26–33)
MCHC RBC AUTO-ENTMCNC: 33.6 GM/DL (ref 32.2–35.5)
MCV RBC AUTO: 88.5 FL (ref 80–94)
MONOCYTES # BLD: 6.3 %
MONOCYTES ABSOLUTE: 0.4 THOU/MM3 (ref 0.4–1.3)
NITRITE, URINE: NEGATIVE
NUCLEATED RED BLOOD CELLS: 0 /100 WBC
OPIATES, URINE: NEGATIVE
OXYCODONE: NEGATIVE
PH UA: 7.5 (ref 5–9)
PHENCYCLIDINE QUANTITATIVE URINE: NEGATIVE
PLATELET # BLD: 217 THOU/MM3 (ref 130–400)
PMV BLD AUTO: 10.6 FL (ref 9.4–12.4)
POTASSIUM SERPL-SCNC: 4.3 MEQ/L (ref 3.5–5.2)
PROTEIN UA: NEGATIVE
RBC # BLD: 4.97 MILL/MM3 (ref 4.7–6.1)
SEG NEUTROPHILS: 50.7 %
SEGMENTED NEUTROPHILS ABSOLUTE COUNT: 3.4 THOU/MM3 (ref 1.8–7.7)
SODIUM BLD-SCNC: 136 MEQ/L (ref 135–145)
SPECIFIC GRAVITY, URINE: 1.02 (ref 1–1.03)
UROBILINOGEN, URINE: 0.2 EU/DL (ref 0–1)
WBC # BLD: 6.7 THOU/MM3 (ref 4.8–10.8)

## 2022-09-28 PROCEDURE — 80048 BASIC METABOLIC PNL TOTAL CA: CPT

## 2022-09-28 PROCEDURE — 6370000000 HC RX 637 (ALT 250 FOR IP): Performed by: STUDENT IN AN ORGANIZED HEALTH CARE EDUCATION/TRAINING PROGRAM

## 2022-09-28 PROCEDURE — 2580000003 HC RX 258: Performed by: STUDENT IN AN ORGANIZED HEALTH CARE EDUCATION/TRAINING PROGRAM

## 2022-09-28 PROCEDURE — 99219 PR INITIAL OBSERVATION CARE/DAY 50 MINUTES: CPT | Performed by: STUDENT IN AN ORGANIZED HEALTH CARE EDUCATION/TRAINING PROGRAM

## 2022-09-28 PROCEDURE — 1200000000 HC SEMI PRIVATE

## 2022-09-28 PROCEDURE — 6370000000 HC RX 637 (ALT 250 FOR IP): Performed by: PHYSICIAN ASSISTANT

## 2022-09-28 PROCEDURE — 76705 ECHO EXAM OF ABDOMEN: CPT

## 2022-09-28 PROCEDURE — 97161 PT EVAL LOW COMPLEX 20 MIN: CPT

## 2022-09-28 PROCEDURE — 36415 COLL VENOUS BLD VENIPUNCTURE: CPT

## 2022-09-28 PROCEDURE — 97116 GAIT TRAINING THERAPY: CPT

## 2022-09-28 PROCEDURE — 85025 COMPLETE CBC W/AUTO DIFF WBC: CPT

## 2022-09-28 PROCEDURE — 90792 PSYCH DIAG EVAL W/MED SRVCS: CPT | Performed by: PSYCHIATRY & NEUROLOGY

## 2022-09-28 PROCEDURE — 6360000002 HC RX W HCPCS: Performed by: STUDENT IN AN ORGANIZED HEALTH CARE EDUCATION/TRAINING PROGRAM

## 2022-09-28 RX ORDER — PROPRANOLOL HYDROCHLORIDE 20 MG/1
20 TABLET ORAL 2 TIMES DAILY
Status: DISCONTINUED | OUTPATIENT
Start: 2022-09-28 | End: 2022-09-30 | Stop reason: HOSPADM

## 2022-09-28 RX ORDER — ONDANSETRON 4 MG/1
4 TABLET, ORALLY DISINTEGRATING ORAL EVERY 8 HOURS PRN
Status: DISCONTINUED | OUTPATIENT
Start: 2022-09-28 | End: 2022-09-30 | Stop reason: HOSPADM

## 2022-09-28 RX ORDER — VITAMIN B COMPLEX
1000 TABLET ORAL 2 TIMES DAILY
Status: DISCONTINUED | OUTPATIENT
Start: 2022-09-28 | End: 2022-09-30 | Stop reason: HOSPADM

## 2022-09-28 RX ORDER — SODIUM CHLORIDE 9 MG/ML
INJECTION, SOLUTION INTRAVENOUS PRN
Status: DISCONTINUED | OUTPATIENT
Start: 2022-09-28 | End: 2022-09-30 | Stop reason: HOSPADM

## 2022-09-28 RX ORDER — MIRTAZAPINE 30 MG/1
30 TABLET, FILM COATED ORAL NIGHTLY
Status: DISCONTINUED | OUTPATIENT
Start: 2022-09-28 | End: 2022-09-30 | Stop reason: HOSPADM

## 2022-09-28 RX ORDER — HYDROXYZINE HYDROCHLORIDE 25 MG/1
50 TABLET, FILM COATED ORAL EVERY 4 HOURS PRN
Status: DISCONTINUED | OUTPATIENT
Start: 2022-09-28 | End: 2022-09-30 | Stop reason: HOSPADM

## 2022-09-28 RX ORDER — MIRTAZAPINE 30 MG/1
30 TABLET, FILM COATED ORAL NIGHTLY
Status: ON HOLD | COMMUNITY
End: 2022-09-30 | Stop reason: HOSPADM

## 2022-09-28 RX ORDER — SODIUM CHLORIDE 0.9 % (FLUSH) 0.9 %
5-40 SYRINGE (ML) INJECTION EVERY 12 HOURS SCHEDULED
Status: DISCONTINUED | OUTPATIENT
Start: 2022-09-28 | End: 2022-09-30 | Stop reason: HOSPADM

## 2022-09-28 RX ORDER — CLONAZEPAM 0.5 MG/1
0.5 TABLET ORAL EVERY 12 HOURS PRN
Status: DISCONTINUED | OUTPATIENT
Start: 2022-09-28 | End: 2022-09-30 | Stop reason: HOSPADM

## 2022-09-28 RX ORDER — CITALOPRAM 20 MG/1
20 TABLET ORAL DAILY
COMMUNITY

## 2022-09-28 RX ORDER — PROPRANOLOL HYDROCHLORIDE 10 MG/1
20 TABLET ORAL 2 TIMES DAILY
COMMUNITY

## 2022-09-28 RX ORDER — HYDROXYZINE 50 MG/1
50 TABLET, FILM COATED ORAL EVERY 4 HOURS PRN
COMMUNITY

## 2022-09-28 RX ORDER — SODIUM CHLORIDE, SODIUM LACTATE, POTASSIUM CHLORIDE, CALCIUM CHLORIDE 600; 310; 30; 20 MG/100ML; MG/100ML; MG/100ML; MG/100ML
INJECTION, SOLUTION INTRAVENOUS CONTINUOUS
Status: ACTIVE | OUTPATIENT
Start: 2022-09-28 | End: 2022-09-28

## 2022-09-28 RX ORDER — CITALOPRAM 20 MG/1
20 TABLET ORAL DAILY
Status: DISCONTINUED | OUTPATIENT
Start: 2022-09-28 | End: 2022-09-30 | Stop reason: HOSPADM

## 2022-09-28 RX ORDER — ONDANSETRON 2 MG/ML
4 INJECTION INTRAMUSCULAR; INTRAVENOUS EVERY 6 HOURS PRN
Status: DISCONTINUED | OUTPATIENT
Start: 2022-09-28 | End: 2022-09-30 | Stop reason: HOSPADM

## 2022-09-28 RX ORDER — SODIUM CHLORIDE 0.9 % (FLUSH) 0.9 %
5-40 SYRINGE (ML) INJECTION PRN
Status: DISCONTINUED | OUTPATIENT
Start: 2022-09-28 | End: 2022-09-30 | Stop reason: HOSPADM

## 2022-09-28 RX ORDER — ENOXAPARIN SODIUM 100 MG/ML
40 INJECTION SUBCUTANEOUS DAILY
Status: DISCONTINUED | OUTPATIENT
Start: 2022-09-28 | End: 2022-09-30 | Stop reason: HOSPADM

## 2022-09-28 RX ORDER — SODIUM CHLORIDE, SODIUM LACTATE, POTASSIUM CHLORIDE, CALCIUM CHLORIDE 600; 310; 30; 20 MG/100ML; MG/100ML; MG/100ML; MG/100ML
INJECTION, SOLUTION INTRAVENOUS CONTINUOUS
Status: DISCONTINUED | OUTPATIENT
Start: 2022-09-28 | End: 2022-09-30 | Stop reason: HOSPADM

## 2022-09-28 RX ADMIN — SODIUM CHLORIDE, POTASSIUM CHLORIDE, SODIUM LACTATE AND CALCIUM CHLORIDE: 600; 310; 30; 20 INJECTION, SOLUTION INTRAVENOUS at 11:39

## 2022-09-28 RX ADMIN — SODIUM CHLORIDE, POTASSIUM CHLORIDE, SODIUM LACTATE AND CALCIUM CHLORIDE: 600; 310; 30; 20 INJECTION, SOLUTION INTRAVENOUS at 03:25

## 2022-09-28 RX ADMIN — Medication 1000 UNITS: at 09:11

## 2022-09-28 RX ADMIN — CLONAZEPAM 0.5 MG: 0.5 TABLET ORAL at 18:42

## 2022-09-28 RX ADMIN — CITALOPRAM HYDROBROMIDE 20 MG: 20 TABLET ORAL at 09:11

## 2022-09-28 RX ADMIN — MIRTAZAPINE 30 MG: 30 TABLET, FILM COATED ORAL at 20:12

## 2022-09-28 RX ADMIN — PROPRANOLOL HYDROCHLORIDE 20 MG: 20 TABLET ORAL at 09:11

## 2022-09-28 RX ADMIN — ENOXAPARIN SODIUM 40 MG: 100 INJECTION SUBCUTANEOUS at 09:12

## 2022-09-28 RX ADMIN — Medication 1000 UNITS: at 20:12

## 2022-09-28 RX ADMIN — HYDROXYZINE HYDROCHLORIDE 50 MG: 25 TABLET, FILM COATED ORAL at 05:34

## 2022-09-28 RX ADMIN — SODIUM CHLORIDE, POTASSIUM CHLORIDE, SODIUM LACTATE AND CALCIUM CHLORIDE: 600; 310; 30; 20 INJECTION, SOLUTION INTRAVENOUS at 07:32

## 2022-09-28 RX ADMIN — HYDROXYZINE HYDROCHLORIDE 50 MG: 25 TABLET, FILM COATED ORAL at 09:11

## 2022-09-28 RX ADMIN — SODIUM CHLORIDE, POTASSIUM CHLORIDE, SODIUM LACTATE AND CALCIUM CHLORIDE: 600; 310; 30; 20 INJECTION, SOLUTION INTRAVENOUS at 16:00

## 2022-09-28 ASSESSMENT — ENCOUNTER SYMPTOMS
NAUSEA: 1
VOMITING: 0
RHINORRHEA: 0
SHORTNESS OF BREATH: 0
SORE THROAT: 1
COUGH: 0
BACK PAIN: 0
ABDOMINAL PAIN: 1
CONSTIPATION: 0
DIARRHEA: 0

## 2022-09-28 NOTE — ED NOTES
Pt provided with ice chips per provider order.        [place], [facility]  09/28/22 9442

## 2022-09-28 NOTE — PROGRESS NOTES
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 Risk, Psychosocial and Contextual Factors: (homeless, lack of social support etc.): Resides alone, unemployed. Current  Treatment: [facility]. Present Suicidal Behavior:    Verbal: 'Just thoughts sometimes, but more like if I did not wake up it would be fine'. Attempt: Denies      Access to Weapons: Family removed weapons. C-SSRS Current Suicide Risk: Low, Moderate or High:  Moderate      Past Suicidal Behavior:    Verbal: xx    Attempts: Denies      Self-Injurious/Self-Mutilation: (Specify)Denies      Traumatic Event Within Past 2 Weeks: (Specify) Denies      Current Abuse:  (Specify) Denies      Legal: (Specify) Denies      Violence: (Specify) Denies      Protective Factors:  Family, active outpatient care with [facility]. Housing: Stable housing      CPAP/Oxygen/Ambulation Difficulties:      Basic Vital Signs:      Critical Labs:      Risk Factors: Resides alone, lack of employment      Clinical Summary:    Patient is a [de-identified] year old single male. He has no children. Patient reports recent admit to [facility] [address]. Ayesha facilitated by [facility]. Patient reports upon discharge from [facility] [address]. Ayesha patient reports his provider 'Bettina NP adjusted his medication. Patient reports increase in depression, feeling worthless, lack of motivation and loss of appetite. Patient reports passive suicidal thoughts come and go but denies any plan or intent to act on thoughts. Patient denies delusions/hallucinations. Level of Care Disposition:      Consulted with Gabriel Grande PA-C concerning the mental status of patient. Waiting medical clearance. 01:35 Patient will be a medical admit with a psyche consult. Consulted with patients RN about abnormalities or medical concerns. No abnormalities or medical concerns noted.

## 2022-09-28 NOTE — ED NOTES
Pt transferred to [facility] room 023 nurse informed.       Hardeep Sahu  09/28/22 0218

## 2022-09-28 NOTE — CARE COORDINATION
9/28/22, 10:18 AM EDT  DISCHARGE PLANNING EVALUATION:    Josh Boyer       Admitted: 9/27/2022/ [facility] day: 0   Location: -23/023-A Reason for admit: Polydipsia [R63.1]  Paresthesias [R20.2]  Depression with suicidal ideation [F32. A, R45.851]  Pancreatitis, unspecified pancreatitis type [K85.90]  Acute pancreatitis without infection or necrosis, unspecified pancreatitis type [K85.90]   PMH:  has a past medical history of Anxiety, GERD (gastroesophageal reflux disease), Hyperlipidemia, Patellar bursitis of left knee, and Psychiatric problem. Procedure: N/A  Barriers to Discharge:  Patient presents due to not feeling well, inability to sleep, and suicidal ideation. Psychiatry consult, IV fluids, Lovenox, prn Atarax and Zofran, daily BMP and CBC, regular diet, SCD's, up as tolerated. PCP: ENRIQUE Potter - CNP   %  Patient's Healthcare Decision Maker: Patient Declined (Legal Next of Kin Remains as Decision Maker)    Patient Goals/Plan/Treatment Preferences: Met with Kendra Vieira. He resides at home alone. Kendra Vieira verifies his insurance and PCP. He is able to afford his medications and does not have a need for DME. He will have transportation to home and denies a need for services at discharge. Kendra Vieira verifies he does have a follow-up appointment with [facility]. Transportation/Food Security/Housekeeping Addressed:  No issues identified.

## 2022-09-28 NOTE — PROGRESS NOTES
Pt admitted to  5K23 via in a wheelchair from ED. Complaints: pancreatitis, depression. INT R AC. IV site free of s/s of infection or infiltration. Vital signs obtained. Assessment and data collection initiated. Two nurse skin assessment performed by Opal PORTILLO and Akbar Weston. Oriented to room. Policies and procedures for 5K explained. All questions answered with no further questions at this time. Fall prevention and safety brochure discussed with patient. Bed alarm on. Call light in reach. Oriented to room. Terri Hall, RN, RN 9/28/2022 3:16 AM     Explained patients right to have family, representative or physician notified of their admission. Patient has Declined for physician to be notified. Patient has Requested for family/representative to be notified.

## 2022-09-28 NOTE — ED PROVIDER NOTES
ATTENDING NOTE:    I supervised and discussed the history, physical exam and the management of this patient with the PA or NP. I reviewed the PA's or NP's note and agree with the documented findings and plan of care.       Electronically verified by MD Iveth Choi MD  09/28/22 0639

## 2022-09-28 NOTE — PLAN OF CARE
Problem: Musculoskeletal - Adult  Goal: Return mobility to safest level of function  Outcome: Progressing  Flowsheets (Taken 9/28/2022 0900)  Return Mobility to Safest Level of Function: Assess patient stability and activity tolerance for standing, transferring and ambulating with or without assistive devices     Problem: Musculoskeletal - Adult  Goal: Return ADL status to a safe level of function  Outcome: Progressing  Flowsheets (Taken 9/28/2022 0900)  Return ADL Status to a Safe Level of Function:   Administer medication as ordered   Assess activities of daily living deficits and provide assistive devices as needed     Problem: Gastrointestinal - Adult  Goal: Maintains adequate nutritional intake  Outcome: Progressing  Flowsheets (Taken 9/28/2022 0900)  Maintains adequate nutritional intake:   Monitor percentage of each meal consumed   Assist with meals as needed   Monitor intake and output, weight and lab values        Problem: Safety - Adult  Goal: Free from fall injury  Outcome: Progressing   All fall precautions in place. Bed in low position, alarm activated and appropriate use of call light. Problem: Skin/Tissue Integrity - Adult  Goal: Skin integrity remains intact  Outcome: Progressing  Flowsheets (Taken 9/28/2022 0900)  Skin Integrity Remains Intact: Monitor for areas of redness and/or skin breakdown     Problem: Skin/Tissue Integrity - Adult  Goal: Oral mucous membranes remain intact  Outcome: Progressing  Flowsheets (Taken 9/28/2022 0900)  Oral Mucous Membranes Remain Intact: Assess oral mucosa and hygiene practices     Problem: Anxiety  Goal: Will report anxiety at manageable levels  Description: INTERVENTIONS:  1. Administer medication as ordered  2. Teach and rehearse alternative coping skills  3.  Provide emotional support with 1:1 interaction with staff  Outcome: Progressing  Flowsheets (Taken 9/28/2022 0900)  Will report anxiety at manageable levels:   Administer medication as ordered Teach and rehearse alternative coping skills     Problem: Coping  Goal: Pt/Family able to verbalize concerns and demonstrate effective coping strategies  Description: INTERVENTIONS:  1. Assist patient/family to identify coping skills, available support systems and cultural and spiritual values  2. Provide emotional support, including active listening and acknowledgement of concerns of patient and caregivers  3. Reduce environmental stimuli, as able  4. Instruct patient/family in relaxation techniques, as appropriate  5. Assess for spiritual pain/suffering and initiate Spiritual Care, Psychosocial Clinical Specialist consults as needed  Outcome: Progressing  Flowsheets (Taken 9/28/2022 0900)  Patient/family able to verbalize anxieties, fears, and concerns, and demonstrate effective coping:   Assist patient/family to identify coping skills, available support systems and cultural and spiritual values   Provide emotional support, including active listening and acknowledgement of concerns of patient and caregivers   Reduce environmental stimuli, as able     Care plan reviewed with patient. Patient verbalizes understanding of the plan of care and contributes to goal setting.

## 2022-09-28 NOTE — CONSULTS
Department of Psychiatry   Psychiatric Assessment      Thank you very much for allowing us to participate in the care of this patient. Reason for Consult:  uncontrolled anxiety and depression    HISTORY OF PRESENT ILLNESS:          The patient is a 55 y.o. male with significant history of anxiety and depression who is admitted medically for pancreatitis and anxiety/depression. Per HPI from ED provider \" David King is a 55 y.o. male who presents for \"feeling pretty bad. \"  Patient reports nausea, burning in his feet, ankles, wrists, and throat, decreased appetite, polydipsia, urinary frequency, urgency, fatigue, difficulty sleeping, intermittent upper abdominal discomfort, and nausea after eating. Patient has had the symptoms for the past month and they seem to be worsening. He has seen Cuate Carson NP of gastroenterology and the end of August had a right upper quadrant ultrasound, CT scan of the abdomen pelvis, and a HIDA scan which were unremarkable. He has had prior EGD and other work-up through GI which has been negative. Patient also has been having worsening depression with \"bad thoughts. \"  Patient states that he will not follow through with these bad thoughts but they are suicidal thoughts. Patient has no prior history of attempt. Patient was released on 9-19 after a 10-day stay at [facility] for depression. His sister-in-law who is present states that patient has been doing worse since discharge. Patient maintains he is compliant with his psychiatric and other medications. Patient feels he needs readmitted at this point. Patient denies chest pain, dyspnea, auditory or visual hallucinations, dizziness, headache, vision changes, or other complaints. Patient lives alone. Patient does not smoke, drink alcohol, or use drugs. \"     On Interview:   Patient is a pleasant 56 y/o  male who presented to the ED for \"feeling pretty bad. \" Patient reports he was recently admitted to [place] [address] for a 10 day inpatient psychiatric stay. He voluntarily went to [address] [place] [address] and was transferred there. He denied feeling better after his release and still felt depressed and anxious. He followed up with [facility] for further medication management and help. The patient said he was given Celexa and Remeron while he was admitted and when he was released started Wellbutrin but was taken off of that on 9/22. Patient states he has been feeling depressed with hopelessness and a loss in his prior interests for around 2 months now. He has been getting less sleep during this time, waking up around 03:00 daily and unable to fall back asleep. This has been causing him to be tired daily and unable to work in his occupation as an . He feels restless during the night and fidgets. His interests included farming and working/being outside but he has lost the drive to do these things for several months now. He believes his general aches, pains, and fatigue is the reason for this. This has all led to a great decrease in his overall energy levels and poor appetite. He has lost weight due to this lack of appetite and he now forces himself to eat because he recognizes this major weight loss and lack of appetite. The patient does not currently have a plan for his suicidal ideations and all of his firearms have been removed from the house already by social workers. He stated he has no homicidal ideations. The patient stated he is currently afraid to drive and go places because he does not want to cause an accident and hurt anyone else. He is a [employer] and used to go to [facility] where he is friends with other people. His support system includes his family such as his brothers and Sister-in-law. He recently moved to his childhood home 3 months ago from a trailer he was living in for 24 years. This move caused him a great amount of stress and he feels like he doesn't have good coping skills for this stress. PSYCHIATRIC HISTORY:      Outpatient psychiatric provider:  Dr. Jeremías Villalpando and Deandre Faustin at [facility]  Suicide attempts: 0  Inpatient psychiatric admissions: x1 released 9/19/2022      Past psychiatric medications includes:     Remeron 30mg, Celexa 20mg, Hydroxyzine 50mg    Patient Reported: Wellbutrin, Effexor, Cymbalta     Adverse reactions from psychotropic medications:    denies    Lifetime Psychiatric Review of Systems         Obsessions and Compulsions: Denies       Janneth or Hypomania: Denies     Hallucinations: Denies     Panic Attacks:  Denies     Delusions:  Denies     Phobias:  Denies     Trauma: Denies    Prior to Admission medications    Medication Sig Start Date End Date Taking? Authorizing Provider   mirtazapine (REMERON) 30 MG tablet Take 30 mg by mouth nightly   Yes Historical Provider, MD   citalopram (CELEXA) 20 MG tablet Take 20 mg by mouth daily   Yes Historical Provider, MD   propranolol (INDERAL) 10 MG tablet Take 20 mg by mouth 2 times daily   Yes Historical Provider, MD   hydrOXYzine HCl (ATARAX) 50 MG tablet Take 50 mg by mouth every 4 hours as needed for Anxiety   Yes Historical Provider, MD   Ascorbic Acid (VITAMIN C) 500 MG tablet Take 500 mg by mouth 4 times daily. Historical Provider, MD   Vitamin D (CHOLECALCIFEROL) 1000 UNITS CAPS capsule Take 1,000 Units by mouth 2 times daily. Historical Provider, MD   OLIVE LEAF PO Take 3 tablets by mouth 3 times daily. Historical Provider, MD   GARLIC PO Take 5,617 mg by mouth three times daily. Historical Provider, MD   NONFORMULARY 200 mg 2 times daily.     Historical Provider, MD        Medications:    Current Facility-Administered Medications: citalopram (CELEXA) tablet 20 mg, 20 mg, Oral, Daily  hydrOXYzine HCl (ATARAX) tablet 50 mg, 50 mg, Oral, Q4H PRN  mirtazapine (REMERON) tablet 30 mg, 30 mg, Oral, Nightly  propranolol (INDERAL) tablet 20 mg, 20 mg, Oral, BID  Vitamin D (CHOLECALCIFEROL) tablet 1,000 Units, 1,000 Units, Oral, BID  lactated ringers infusion, , IntraVENous, Continuous **FOLLOWED BY** lactated ringers infusion, , IntraVENous, Continuous  sodium chloride flush 0.9 % injection 5-40 mL, 5-40 mL, IntraVENous, 2 times per day  sodium chloride flush 0.9 % injection 5-40 mL, 5-40 mL, IntraVENous, PRN  0.9 % sodium chloride infusion, , IntraVENous, PRN  ondansetron (ZOFRAN-ODT) disintegrating tablet 4 mg, 4 mg, Oral, Q8H PRN **OR** ondansetron (ZOFRAN) injection 4 mg, 4 mg, IntraVENous, Q6H PRN  enoxaparin (LOVENOX) injection 40 mg, 40 mg, SubCUTAneous, Daily     Past Medical History:        Diagnosis Date    Anxiety     GERD (gastroesophageal reflux disease)     Hyperlipidemia     Patellar bursitis of left knee 8/8/2014    Psychiatric problem        Past Surgical History:        Procedure Laterality Date    COLONOSCOPY  unsure    ENDOSCOPY, COLON, DIAGNOSTIC  unsure       Allergies: Patient has no known allergies. Social History:    Born in: [address]  Family: Older brother and younger brother   Highest Level of Education: high school  Occupation:   Marital Status: single  Children: none  Residence: childhood home [address]   Stressors: residence relocation, finances, lack of lifetime accomplishments. Patient Assets/Supportive Factors: two brothers and a sister-in-law. Willingness to ask for help is large.     SUBSTANCE USE HISTORY: denies    Family Medical and Psychiatric History:     Mom- depression, dementia          Problem Relation Age of Onset    Heart Disease Father        Physical  /72   Pulse 59   Temp 98.7 °F (37.1 °C) (Oral)   Resp 16   Ht 5' 9\" (1.753 m)   Wt 190 lb (86.2 kg)   SpO2 96%   BMI 28.06 kg/m²     Mental Status Examination:  Level of consciousness:  Within normal limits  Appearance: hospital attire, lying in bed, fair grooming  Behavior/Motor:  no abnormalities noted  Attitude toward examiner:  cooperative, attentive and good eye contact  Speech:  Spontaneous, normal rate and volume  Mood:  anxious, dysphoric  Affect: mood congruent  Thought processes:  Linear, goal directed, coherent  Thought content: mild suicidal ideations   Denies homicidal ideations    Denies hallucinations   Denies delusions  Cognition:  Oriented to self, situation, location, date  Concentration clinically adequate  Memory age appropriate  Insight & Judgment:  fair    DSM-5 DIAGNOSIS:  Major Depressive Disorder,recurrent, severe, without psychosis    General Anxiety Disorder     Stressors     Severity of stressors is severe. Source of stressors include: home relocation, finances, lack of goals and achievements during lifetime. Other psychosocial and environmental stressors. PLAN:      #Discharge patient with close outpatient Psychiatric follow up: The patient was recently discharged from [facility] for an inpatient psych admission where he was started on new medications. He followed up with [facility] to continually adjust medications. His next appointment is on October 7th. Due to the recent starting of the medications, time to effectiveness is still being achieved. Patient has a great support system from his brother who checks in on him. Patient no longer has firearms in the house and has no plans for suicide. Clonipin will be started for patient's anxiety. #Recommend outpatient therapy: Patient feels like he would benefit from seeing a therapist to talk about the recent changes in his life. He has never thought about these stressors causing the exacerbation of his symptoms. Therapy may help him form new coping strategies and get him back to working which is a goal he desires. Additional recommendations will follow the clinical course. Thank you very much for allowing us to participate in the care of this patient. Time spent 60 min.       Electronically signed by Kyle Byrd on 9/28/22 at 8:12 AM EDT

## 2022-09-28 NOTE — H&P
Hospitalist - History & Physical      Patient: Dylan Ziegler    Unit/Bed:29/029A  YOB: 1965  MRN: [id number]   Acct: [de-identified]   PCP: ENRIQUE Ojeda CNP    Date of Service: Pt seen/examined on 09/28/22  and Admitted to Observation with expected LOS less than two midnights due to medical therapy. Chief Complaint:  uncontrolled anxiety and depression    Assessment and Plan:-  Pancreatitis: noted on CT and elevated lipase of 505. Abdominal exam benign, mild discomfort on palpation of epigastric region. Started on IVF, will get abdomen ultrasound to assess for gallstones and will check TG levels. Started on regular diet. Anxiety/depression: No current SI, thus no sitter needed. Psychiatry consulted. Will continue home citalopram 20 mg daily, remeron and propranolol BID  Polydipsia: will check HgbA1c  Back pain and feet pain: physical exam benign, no significant tenderness, pain control with tylenol PRN. PT ordered. Vitamin D deficiency: continue home vitamin D supplementation. History Of Present Illness:    Mr. Dylan Ziegler is a 55 y.o. male with PMHx of anxiety, depression who came in today due to concern for uncontrolled anxiety and depression. He has been feeling tired, anxious and nauseous since last couple of days. No SI. No plan to hurt himself. No HI. Reports chills but no fever. He reports increased thirst. He denies abdominal pain but reports being uncomfortable and it making noise. His psychiatrist is NP named Blanca Holden who works under Dr. Rani Dickinson. He reports that he is stressed because of back pain and bottom of feet pain but no personal stressors. No alcohol use. At ED, he was found to have lipase of 505 and CT abdomen pelvis revealing of edema of uncinate process of pancreas suggestive of pancreatitis.        Past Medical History:        Diagnosis Date    Anxiety     GERD (gastroesophageal reflux disease)     Hyperlipidemia     Patellar bursitis of left knee 8/8/2014 Psychiatric problem        Past Surgical History:        Procedure Laterality Date    COLONOSCOPY  unsure    ENDOSCOPY, COLON, DIAGNOSTIC  unsure       Home Medications:   No current facility-administered medications on file prior to encounter. Current Outpatient Medications on File Prior to Encounter   Medication Sig Dispense Refill    mirtazapine (REMERON) 30 MG tablet Take 30 mg by mouth nightly      citalopram (CELEXA) 20 MG tablet Take 20 mg by mouth daily      propranolol (INDERAL) 10 MG tablet Take 20 mg by mouth 2 times daily      hydrOXYzine HCl (ATARAX) 50 MG tablet Take 50 mg by mouth every 4 hours as needed for Anxiety      Ascorbic Acid (VITAMIN C) 500 MG tablet Take 500 mg by mouth 4 times daily. Vitamin D (CHOLECALCIFEROL) 1000 UNITS CAPS capsule Take 1,000 Units by mouth 2 times daily. OLIVE LEAF PO Take 3 tablets by mouth 3 times daily. GARLIC PO Take 2,188 mg by mouth three times daily. NONFORMULARY 200 mg 2 times daily. Allergies:    Patient has no known allergies. Social History:    reports that he has never smoked. He has never used smokeless tobacco. He reports that he does not drink alcohol and does not use drugs. Family History:       Problem Relation Age of Onset    Heart Disease Father        Diet:  No diet orders on file    Review of systems:   Pertinent positives as noted in the HPI. All other systems reviewed and negative. PHYSICAL EXAM:  BP (!) 168/94   Pulse 63   Temp 98.4 °F (36.9 °C) (Oral)   Resp 18   SpO2 97%   General appearance: No apparent distress, appears stated age and cooperative. HEENT: Normal cephalic, atraumatic without obvious deformity. Pupils equal, round, and reactive to light. Extra ocular muscles intact. Conjunctivae/corneas clear. Neck: Supple, with full range of motion. No jugular venous distention. Trachea midline. Respiratory:  Normal respiratory effort.  Clear to auscultation, bilaterally without Rales/Wheezes/Rhonchi. Cardiovascular: Regular rate and rhythm with normal S1/S2 without murmurs, rubs or gallops. Abdomen: Soft, Mild discomfort on palpation of epigastric region, non-distended with normal bowel sounds. Musculoskeletal:  No clubbing, cyanosis or edema bilaterally. Skin: Skin color, texture, turgor normal.  No rashes or lesions. Neurologic:  Neurovascularly intact without any focal sensory/motor deficits. Cranial nerves: II-XII intact, grossly non-focal.  Psychiatric: Alert and oriented, thought content appropriate, normal insight  Capillary Refill: Brisk,< 3 seconds   Peripheral Pulses: +2 palpable, equal bilaterally     Labs:   Recent Labs     09/27/22 2013   WBC 8.1   HGB 14.5   HCT 42.7        Recent Labs     09/27/22 2013      K 4.2      CO2 26   BUN 16   CREATININE 0.8   CALCIUM 9.1     Recent Labs     09/27/22 2013   AST 12   ALT 13   BILITOT 0.3   ALKPHOS 29*     No results for input(s): INR in the last 72 hours. No results for input(s): [place] in the last 72 hours. Urinalysis:    Lab Results   Component Value Date/Time    NITRU NEGATIVE 09/27/2022 11:35 PM    WBCUA 0 12/02/2014 02:34 AM    BACTERIA NONE 12/02/2014 02:34 AM    RBCUA 0 12/02/2014 02:34 AM    BLOODU NEGATIVE 09/27/2022 11:35 PM    SPECGRAV <=1.005 12/02/2014 02:34 AM    GLUCOSEU NEGATIVE 09/27/2022 11:35 PM       Radiology:   CT ABDOMEN PELVIS W IV CONTRAST Additional Contrast? None   Final Result   1. Likely edema of the uncinate process of the pancreas. Suspect    pancreatitis. 2. Mildly enlarged prostate. This document has been electronically signed by: Quinton Schaefer MD on    09/27/2022 11:53 PM      All CTs at this facility use dose modulation techniques and iterative    reconstructions, and/or weight-based dosing   when appropriate to reduce radiation to a low as reasonably achievable.         CT ABDOMEN PELVIS W IV CONTRAST Additional Contrast? None    Result Date: 9/27/2022  CT abdomen and pelvis with contrast Comparison: None Findings: No consolidation or effusion. Heterogeneity of the uncinate process of the pancreas. Minimal infiltration of adjacent fat. No pancreatic duct dilatation or collection. Small cyst within the left lobe of the liver. Unremarkable pancreas and adrenal glands. Bilateral kidney cysts. No calcified gallstones. No biliary dilatation. No bowel obstruction, pneumoperitoneum, or pneumatosis. Mildly enlarged prostate gland with median lobe hypertrophy. Unremarkable urinary bladder. Normal appendix. No acute fracture. 1. Likely edema of the uncinate process of the pancreas. Suspect pancreatitis. 2. Mildly enlarged prostate. This document has been electronically signed by: Renate Chanel MD on 09/27/2022 11:53 PM All CTs at this facility use dose modulation techniques and iterative reconstructions, and/or weight-based dosing when appropriate to reduce radiation to a low as reasonably achievable.         EKG: N/A    Electronically signed by Promise Tucker MD on 9/28/2022 at 1:21 AM

## 2022-09-28 NOTE — PROGRESS NOTES
[address] [place] [id number]  INPATIENT PHYSICAL THERAPY  EVALUATION  [facility] ONC MED 5K - [id number]    Time In: 4982  Time Out: 1135  Timed Code Treatment Minutes: 8 Minutes  Minutes: 17          Date: 2022  Patient Name: Jarrett Abraham,  Gender:  male        MRN: [id number]  : 1965  (55 y.o.)      Referring Practitioner: Ying Brush MD  Diagnosis: polydipsia  Additional Pertinent Hx: Per EMR \"Mr. Jarrett Abraham is a 55 y.o. male with PMHx of anxiety, depression who came in today due to concern for uncontrolled anxiety and depression. He has been feeling tired, anxious and nauseous since last couple of days. No SI. No plan to hurt himself. No HI. Reports chills but no fever. He reports increased thirst. He denies abdominal pain but reports being uncomfortable and it making noise. His psychiatrist is NP named Nader Kinney who works under Dr. Miguel Billy. He reports that he is stressed because of back pain and bottom of feet pain but no personal stressors. No alcohol use. At ED, he was found to have lipase of 505 and CT abdomen pelvis revealing of edema of uncinate process of pancreas suggestive of pancreatitis. \"     Restrictions/Precautions:  Restrictions/Precautions: General Precautions    Subjective:  Chart Reviewed: Yes  Patient assessed for rehabilitation services?: Yes  Family / Caregiver Present: No  Subjective: OK to see pt per nursing. Pt in bed when PT arrived, agreeable to PT session, not very conversive during session. Pt willing to complete mobility, however, wanted back in bed. General:  Overall Orientation Status: Within Normal Limits  Orientation Level: Oriented X4  Overall Cognitive Status:  WNL  Vision: Within Functional Limits  Hearing: Within functional limits       Pain: denies    Vitals: Vitals not assessed per clinical judgement, see nursing flowsheet    Social/Functional History:    Lives With: Alone  Type of Home: House  Home Layout: Two level, Performs ADL's on one level  Home Access: Stairs to enter with rails  Entrance Stairs - Number of Steps: 1 MICHELLE  Entrance Stairs - Rails: Left  Home Equipment:  (no DME)     Bathroom Shower/Tub: Walk-in shower  Bathroom Toilet: Standard       ADL Assistance: Independent  Homemaking Assistance: Independent  Ambulation Assistance: Independent  Transfer Assistance: Independent    Active : Yes  Occupation: Unemployed  Additional Comments: Pt amb with no AD prior, IND and active with mobility    OBJECTIVE:  Range of Motion:  Bilateral Lower Extremity: WNL    Strength:  Bilateral Lower Extremity: WNL    Balance:  Static Sitting Balance:  Independent  Dynamic Sitting Balance: Independent  Static Standing Balance: Independent  Dynamic Standing Balance: Independent  Pt completed toileting, no LOB with mobility  Bed Mobility:  Supine to Sit: Independent  Sit to Supine: Independent     Transfers:  Sit to Stand: Independent  Stand to Sit:Independent    Ambulation:  Supervision  Distance: >250 feet, 15 feet  Surface: Level Tile  Device:No Device  Gait Deviations:  Decreased Step Length on Left and Decreased Gait Speed  Able to manage IV pole. Functional Outcome Measures: Completed  -PAC Inpatient Mobility Raw Score : 23  -PAC Inpatient T-Scale Score : 56.93    ASSESSMENT:  Activity Tolerance:  Patient tolerance of  treatment: good. Treatment Initiated: Treatment and education initiated within context of evaluation. Evaluation time included review of current medical information, gathering information related to past medical, social and functional history, completion of standardized testing, formal and informal observation of tasks, assessment of data and development of plan of care and goals. Treatment time included skilled education and facilitation of tasks to increase safety and independence with functional mobility for improved independence and quality of life. Assessment:   Body Structures, Functions, Activity Limitations Requiring Skilled Therapeutic Intervention: Decreased tolerance to work activity  Therapy Prognosis: Excellent    Requires PT Follow-Up: No  No Skilled PT: Safe to return home    Discharge Recommendations:  Discharge Recommendations: Home with assist PRN    Patient Education:      . Patient Education  Education Given To: Patient  Education Provided: Role of Therapy, Plan of Care  Education Method: Verbal  Education Outcome: Verbalized understanding       Equipment Recommendations:  Equipment Needed: No    Plan:  Plan: Discharge with evaluation only    Goals:  Patient goals : return home  Short Term Goals  Time Frame for Short term goals: NA  Long Term Goals  Time Frame for Long term goals : NA    Following session, patient left in safe position with all fall risk precautions in place. In bed, all needs in  reach.

## 2022-09-28 NOTE — PLAN OF CARE
Continue IVF. Psychiatry evaluated, per Dr. Sixto Alston attestation, \"Please consider adding clonazepam 0.5 mg twice daily to see if that can help with severe breakthrough anxiety. \"     Clonazepam 0.5 mg BID PRN ordered. Continue to monitor abdominal pain, pt reportedly tolerating PO diet. Trend labs. Consider GI consult if needed.      Electronically signed by Tamara Garg PA-C on 9/28/2022 at 6:01 PM

## 2022-09-28 NOTE — ED NOTES
ED to inpatient nurses report    Chief Complaint   Patient presents with    Depression      Present to ED from home  LOC: alert and orientated to name, place, date  Vital signs   Vitals:    09/27/22 1833   BP: (!) 168/94   Pulse: 63   Resp: 18   Temp: 98.4 °F (36.9 °C)   TempSrc: Oral   SpO2: 97%      Oxygen Baseline room air     Current needs required none Bipap/Cpap No  LDAs:   Peripheral IV 09/27/22 Right Antecubital (Active)   Site Assessment Clean, dry & intact 09/27/22 2256   Line Status Blood return noted;Normal saline locked 09/27/22 2256     Mobility: Independent  Pending ED orders: None  Present condition: Stable     C-SSRS Risk of Suicide: Low Risk  Swallow Screening    Preferred Language: [place]     Electronically signed by Miki Rolle RN on 9/28/2022 at 2:04 AM       Miki Rolle RN  09/28/22 5717

## 2022-09-28 NOTE — ED PROVIDER NOTES
[facility] EMERGENCY DEPT      CHIEF COMPLAINT       Chief Complaint   Patient presents with    Depression       Nurses Notes reviewed and I agree except as noted in the HPI. HISTORY OF PRESENT ILLNESS    Kapil Arizmendi is a 55 y.o. male who presents for \"feeling pretty bad. \"  Patient reports nausea, burning in his feet, ankles, wrists, and throat, decreased appetite, polydipsia, urinary frequency, urgency, fatigue, difficulty sleeping, intermittent upper abdominal discomfort, and nausea after eating. Patient has had the symptoms for the past month and they seem to be worsening. He has seen [facility] of gastroenterology and the end of August had a right upper quadrant ultrasound, CT scan of the abdomen pelvis, and a HIDA scan which were unremarkable. He has had prior EGD and other work-up through GI which has been negative. Patient also has been having worsening depression with \"bad thoughts. \"  Patient states that he will not follow through with these bad thoughts but they are suicidal thoughts. Patient has no prior history of attempt. Patient was released on 9-19 after a 10-day stay at [facility] for depression. His sister-in-law who is present states that patient has been doing worse since discharge. Patient maintains he is compliant with his psychiatric and other medications. Patient feels he needs readmitted at this point. Patient denies chest pain, dyspnea, auditory or visual hallucinations, dizziness, headache, vision changes, or other complaints. Patient lives alone. Patient does not smoke, drink alcohol, or use drugs. REVIEW OF SYSTEMS     Review of Systems   Constitutional:  Positive for appetite change and fatigue. Negative for chills and fever. HENT:  Positive for sore throat. Negative for congestion, ear pain and rhinorrhea. Eyes:  Negative for visual disturbance. Respiratory:  Negative for cough and shortness of breath.     Cardiovascular:  Negative for chest pain.   Gastrointestinal:  Positive for abdominal pain and nausea. Negative for constipation, diarrhea and vomiting. Endocrine: Positive for polydipsia. Negative for polyuria. Genitourinary:  Positive for frequency and urgency. Negative for difficulty urinating, dysuria and flank pain. Musculoskeletal:  Positive for myalgias. Negative for back pain and gait problem. Skin:  Negative for rash. Neurological:  Negative for dizziness, weakness, numbness and headaches. Psychiatric/Behavioral:  Positive for dysphoric mood, sleep disturbance and suicidal ideas. Negative for confusion and hallucinations. PAST MEDICAL HISTORY    has a past medical history of Anxiety, GERD (gastroesophageal reflux disease), Hyperlipidemia, Patellar bursitis of left knee, and Psychiatric problem. SURGICAL HISTORY      has a past surgical history that includes Colonoscopy (unsure) and Endoscopy, colon, diagnostic (unsure). CURRENT MEDICATIONS       Previous Medications    ASCORBIC ACID (VITAMIN C) 500 MG TABLET    Take 500 mg by mouth 4 times daily. GARLIC PO    Take 9,885 mg by mouth three times daily. HYDROCORTISONE (CORTEF) 20 MG TABLET    Take by mouth    NONFORMULARY    200 mg 2 times daily. OLIVE LEAF PO    Take 3 tablets by mouth 3 times daily. TRAZODONE (DESYREL) 50 MG TABLET    take 1 tablet by mouth at bedtime if needed for sleep / insomnia    VITAMIN D (CHOLECALCIFEROL) 1000 UNITS CAPS CAPSULE    Take 1,000 Units by mouth 2 times daily. ALLERGIES     has No Known Allergies. FAMILY HISTORY     He indicated that his mother is alive. He indicated that his father is . family history includes Heart Disease in his father. SOCIAL HISTORY    reports that he has never smoked. He has never used smokeless tobacco. He reports that he does not drink alcohol and does not use drugs. PHYSICAL EXAM     INITIAL VITALS:  oral temperature is 98.4 °F (36.9 °C).  His blood pressure is 168/94 (abnormal) and his pulse is 63. His respiration is 18 and oxygen saturation is 97%. Physical Exam  Vitals and nursing note reviewed. Constitutional:       General: He is not in acute distress. Appearance: Normal appearance. He is well-developed. He is not toxic-appearing or diaphoretic. HENT:      Head: Normocephalic and atraumatic. Right Ear: Hearing normal.      Left Ear: Hearing normal.      Nose: Nose normal. No rhinorrhea. Mouth/Throat:      Lips: Pink. Mouth: Mucous membranes are moist.      Pharynx: Uvula midline. Eyes:      General: Lids are normal. No scleral icterus. Extraocular Movements: Extraocular movements intact. Conjunctiva/sclera: Conjunctivae normal.      Pupils: Pupils are equal, round, and reactive to light. Neck:      Trachea: Trachea normal. No tracheal deviation. Cardiovascular:      Rate and Rhythm: Normal rate and regular rhythm. Heart sounds: Normal heart sounds. No murmur heard. Pulmonary:      Effort: Pulmonary effort is normal. No respiratory distress. Breath sounds: Normal breath sounds. No stridor. No decreased breath sounds or wheezing. Abdominal:      General: Bowel sounds are normal. There is no distension. Palpations: Abdomen is soft. Abdomen is not rigid. There is no pulsatile mass. Tenderness: There is abdominal tenderness in the epigastric area. There is no right CVA tenderness, left CVA tenderness, guarding or rebound. Negative signs include Vigil's sign and McBurney's sign. Hernia: No hernia is present. Musculoskeletal:         General: Normal range of motion. Cervical back: No rigidity. No muscular tenderness. Comments: Movement normal as observed   Lymphadenopathy:      Cervical: No cervical adenopathy. Skin:     General: Skin is warm and dry. Coloration: Skin is not pale. Findings: No rash. Neurological:      General: No focal deficit present.       Mental Status: He is alert and oriented to person, place, and time. Sensory: No sensory deficit. Gait: Gait normal.   Psychiatric:         Attention and Perception: Attention normal.         Mood and Affect: Mood is depressed. Speech: Speech normal.         Behavior: Behavior normal. Behavior is cooperative. Thought Content: Thought content includes suicidal ideation. Thought content does not include suicidal plan. Cognition and Memory: Cognition normal.       DIFFERENTIAL DIAGNOSIS:   Including but not limited to: Depression, suicidality, anxiety about health, diabetes, cholelithiasis, cholecystitis, pancreatitis, dyspepsia, gastroparesis, unspecified mood disorder    DIAGNOSTIC RESULTS     EKG: All [facility]'s [facility] who either signs or Co-signs this chart in the absence of a cardiologist.  None    RADIOLOGY: non-plain film images(s) such as CT,Ultrasound and MRI are read by the radiologist.  Plain radiographic images are visualized and preliminarily interpreted by the emergency physician unless otherwise stated below. CT ABDOMEN PELVIS W IV CONTRAST Additional Contrast? None   Final Result   1. Likely edema of the uncinate process of the pancreas. Suspect    pancreatitis. 2. Mildly enlarged prostate. This document has been electronically signed by: Pina Resendiz MD on    09/27/2022 11:53 PM      All CTs at this facility use dose modulation techniques and iterative    reconstructions, and/or weight-based dosing   when appropriate to reduce radiation to a low as reasonably achievable.           LABS:   Labs Reviewed   CBC WITH AUTO DIFFERENTIAL - Abnormal; Notable for the following components:       Result Value    Eosinophils Absolute 0.6 (*)     All other components within normal limits   COMPREHENSIVE METABOLIC PANEL - Abnormal; Notable for the following components:    Alkaline Phosphatase 29 (*)     All other components within normal limits   LIPASE - Abnormal; Notable for the following components:    Lipase 505.0 (*)     All other components within normal limits   SALICYLATE LEVEL - Abnormal; Notable for the following components:    Salicylate, Serum < 0.3 (*)     All other components within normal limits   OSMOLALITY - Abnormal; Notable for the following components:    Osmolality Calc 274.4 (*)     All other components within normal limits   ACETAMINOPHEN LEVEL   ETHANOL   URINE DRUG SCREEN   URINALYSIS WITH REFLEX TO CULTURE   ANION GAP   GLOMERULAR FILTRATION RATE, ESTIMATED   HEMOGLOBIN A1C       EMERGENCY DEPARTMENT COURSE:   Vitals:    Vitals:    09/27/22 1833   BP: (!) 168/94   Pulse: 63   Resp: 18   Temp: 98.4 °F (36.9 °C)   TempSrc: Oral   SpO2: 97%       MDM:  The patient was seen by me in the emergency room for depression with suicidal thoughts and additionally physical complaints of nausea, abdominal pain, decreased appetite, polydipsia among other complaints. Physical exam revealed a depressed 27-year-old gentleman with epigastric tenderness. Vital signs reviewed and noted stable. Old records were reviewed. Appropriate laboratory and imaging studies were ordered and reviewed upon completion. Pertinent findings: Lipase of 505; CT scan showing edema of the uncinate process of the pancreas consistent with pancreatitis    Interventions: Normal saline, evaluation by Vj Bose from [facility] OF [facility]. I discussed this patient with my attending physician, Dr. South Shi, who aided in medical decision making. Reexamination: Patient remained stable in the emergency department and felt improved. He requested oral nutrition. Patient did not meet criteria for inpatient psychiatric admission and admission to CSU was considered. However patient could not be medically cleared due to pancreatitis. Results were discussed with the patient and sister-in-law as well as desire for admission and they were amenable.  Dr. Umesh Valverde of our hospitalists' service was consulted and graciously accepted admission. The patient was admitted to the hospital in fair condition. CRITICAL CARE:   None    CONSULTS:  Sule[facility] Negron [facility])  9230: Dr. Lawernce Boxer (hospitalist)    PROCEDURES:  None    FINAL IMPRESSION      1. Acute pancreatitis without infection or necrosis, unspecified pancreatitis type    2. Depression with suicidal ideation    3. Polydipsia    4. Paresthesias          DISPOSITION/PLAN     1. Acute pancreatitis without infection or necrosis, unspecified pancreatitis type    2. Depression with suicidal ideation    3. Polydipsia    4.  Paresthesias    Admission      (Please note that portions of this note were completed with a voice recognition program.  Efforts were made to edit the dictations but occasionally words are mis-transcribed.)    Gabriel Grande PA-C 09/28/22 1:08 AM    ADALBERTO Diaz PA-C  09/28/22 0510

## 2022-09-29 LAB
ANION GAP SERPL CALCULATED.3IONS-SCNC: 7 MEQ/L (ref 8–16)
BASOPHILS # BLD: 0.9 %
BASOPHILS ABSOLUTE: 0.1 THOU/MM3 (ref 0–0.1)
BUN BLDV-MCNC: 12 MG/DL (ref 7–22)
CALCIUM SERPL-MCNC: 8.8 MG/DL (ref 8.5–10.5)
CHLORIDE BLD-SCNC: 108 MEQ/L (ref 98–111)
CO2: 27 MEQ/L (ref 23–33)
CREAT SERPL-MCNC: 0.9 MG/DL (ref 0.4–1.2)
EOSINOPHIL # BLD: 8 %
EOSINOPHILS ABSOLUTE: 0.5 THOU/MM3 (ref 0–0.4)
ERYTHROCYTE [DISTWIDTH] IN BLOOD BY AUTOMATED COUNT: 12.2 % (ref 11.5–14.5)
ERYTHROCYTE [DISTWIDTH] IN BLOOD BY AUTOMATED COUNT: 39.1 FL (ref 35–45)
GFR SERPL CREATININE-BSD FRML MDRD: 87 ML/MIN/1.73M2
GLUCOSE BLD-MCNC: 97 MG/DL (ref 70–108)
HCT VFR BLD CALC: 40.2 % (ref 42–52)
HEMOGLOBIN: 13.6 GM/DL (ref 14–18)
IMMATURE GRANS (ABS): 0.01 THOU/MM3 (ref 0–0.07)
IMMATURE GRANULOCYTES: 0.2 %
LIPASE: 145.9 U/L (ref 5.6–51.3)
LYMPHOCYTES # BLD: 40.1 %
LYMPHOCYTES ABSOLUTE: 2.3 THOU/MM3 (ref 1–4.8)
MCH RBC QN AUTO: 29.8 PG (ref 26–33)
MCHC RBC AUTO-ENTMCNC: 33.8 GM/DL (ref 32.2–35.5)
MCV RBC AUTO: 88.2 FL (ref 80–94)
MONOCYTES # BLD: 6.8 %
MONOCYTES ABSOLUTE: 0.4 THOU/MM3 (ref 0.4–1.3)
NUCLEATED RED BLOOD CELLS: 0 /100 WBC
PLATELET # BLD: 209 THOU/MM3 (ref 130–400)
PMV BLD AUTO: 10.5 FL (ref 9.4–12.4)
POTASSIUM SERPL-SCNC: 4.6 MEQ/L (ref 3.5–5.2)
RBC # BLD: 4.56 MILL/MM3 (ref 4.7–6.1)
SEG NEUTROPHILS: 44 %
SEGMENTED NEUTROPHILS ABSOLUTE COUNT: 2.6 THOU/MM3 (ref 1.8–7.7)
SODIUM BLD-SCNC: 142 MEQ/L (ref 135–145)
TRIGL SERPL-MCNC: 107 MG/DL (ref 0–199)
VITAMIN B-12: 1683 PG/ML (ref 211–911)
WBC # BLD: 5.8 THOU/MM3 (ref 4.8–10.8)

## 2022-09-29 PROCEDURE — 99232 SBSQ HOSP IP/OBS MODERATE 35: CPT | Performed by: PHYSICIAN ASSISTANT

## 2022-09-29 PROCEDURE — 1200000000 HC SEMI PRIVATE

## 2022-09-29 PROCEDURE — 84478 ASSAY OF TRIGLYCERIDES: CPT

## 2022-09-29 PROCEDURE — 85025 COMPLETE CBC W/AUTO DIFF WBC: CPT

## 2022-09-29 PROCEDURE — 6360000002 HC RX W HCPCS: Performed by: STUDENT IN AN ORGANIZED HEALTH CARE EDUCATION/TRAINING PROGRAM

## 2022-09-29 PROCEDURE — 6370000000 HC RX 637 (ALT 250 FOR IP): Performed by: PHYSICIAN ASSISTANT

## 2022-09-29 PROCEDURE — 2580000003 HC RX 258: Performed by: STUDENT IN AN ORGANIZED HEALTH CARE EDUCATION/TRAINING PROGRAM

## 2022-09-29 PROCEDURE — 83690 ASSAY OF LIPASE: CPT

## 2022-09-29 PROCEDURE — 82607 VITAMIN B-12: CPT

## 2022-09-29 PROCEDURE — 36415 COLL VENOUS BLD VENIPUNCTURE: CPT

## 2022-09-29 PROCEDURE — 6370000000 HC RX 637 (ALT 250 FOR IP): Performed by: STUDENT IN AN ORGANIZED HEALTH CARE EDUCATION/TRAINING PROGRAM

## 2022-09-29 PROCEDURE — 80048 BASIC METABOLIC PNL TOTAL CA: CPT

## 2022-09-29 RX ORDER — POLYETHYLENE GLYCOL 3350 17 G/17G
17 POWDER, FOR SOLUTION ORAL DAILY
Status: DISCONTINUED | OUTPATIENT
Start: 2022-09-29 | End: 2022-09-30 | Stop reason: HOSPADM

## 2022-09-29 RX ORDER — DOCUSATE SODIUM 100 MG/1
100 CAPSULE, LIQUID FILLED ORAL DAILY
Status: DISCONTINUED | OUTPATIENT
Start: 2022-09-29 | End: 2022-09-30 | Stop reason: HOSPADM

## 2022-09-29 RX ADMIN — POLYETHYLENE GLYCOL 3350 17 G: 17 POWDER, FOR SOLUTION ORAL at 11:12

## 2022-09-29 RX ADMIN — SODIUM CHLORIDE, POTASSIUM CHLORIDE, SODIUM LACTATE AND CALCIUM CHLORIDE: 600; 310; 30; 20 INJECTION, SOLUTION INTRAVENOUS at 16:21

## 2022-09-29 RX ADMIN — CITALOPRAM HYDROBROMIDE 20 MG: 20 TABLET ORAL at 09:07

## 2022-09-29 RX ADMIN — ENOXAPARIN SODIUM 40 MG: 100 INJECTION SUBCUTANEOUS at 09:07

## 2022-09-29 RX ADMIN — MIRTAZAPINE 30 MG: 30 TABLET, FILM COATED ORAL at 21:10

## 2022-09-29 RX ADMIN — Medication 1000 UNITS: at 21:10

## 2022-09-29 RX ADMIN — CLONAZEPAM 0.5 MG: 0.5 TABLET ORAL at 18:43

## 2022-09-29 RX ADMIN — PROPRANOLOL HYDROCHLORIDE 20 MG: 20 TABLET ORAL at 09:07

## 2022-09-29 RX ADMIN — Medication 1000 UNITS: at 09:07

## 2022-09-29 RX ADMIN — DOCUSATE SODIUM 100 MG: 100 CAPSULE, LIQUID FILLED ORAL at 11:12

## 2022-09-29 RX ADMIN — PROPRANOLOL HYDROCHLORIDE 20 MG: 20 TABLET ORAL at 21:09

## 2022-09-29 NOTE — PLAN OF CARE
Problem: Skin/Tissue Integrity - Adult  Goal: Skin integrity remains intact  Outcome: Progressing  Flowsheets  Taken 9/29/2022 0907 by Erinn Osullivan RN  Skin Integrity Remains Intact: Monitor for areas of redness and/or skin breakdown  Taken 9/29/2022 0746 by Vicki 51 Intact: Monitor for areas of redness and/or skin breakdown     Problem: Skin/Tissue Integrity - Adult  Goal: Oral mucous membranes remain intact  Outcome: Progressing  Flowsheets (Taken 9/29/2022 0907)  Oral Mucous Membranes Remain Intact: Assess oral mucosa and hygiene practices     Problem: Musculoskeletal - Adult  Goal: Return mobility to safest level of function  Outcome: Progressing  Flowsheets (Taken 9/29/2022 0907)  Return Mobility to Safest Level of Function: Assess patient stability and activity tolerance for standing, transferring and ambulating with or without assistive devices     Problem: Musculoskeletal - Adult  Goal: Return ADL status to a safe level of function  Outcome: Progressing  Flowsheets (Taken 9/29/2022 0907)  Return ADL Status to a Safe Level of Function: Administer medication as ordered     Problem: Gastrointestinal - Adult  Goal: Maintains adequate nutritional intake  Outcome: Progressing  Flowsheets  Taken 9/29/2022 0907 by Erinn Osullivan RN  Maintains adequate nutritional intake: Monitor percentage of each meal consumed    Problem: Anxiety  Goal: Will report anxiety at manageable levels  Description: INTERVENTIONS:  1. Administer medication as ordered  2. Teach and rehearse alternative coping skills  3. Provide emotional support with 1:1 interaction with staff  Outcome: Progressing  Flowsheets (Taken 9/29/2022 0907)  Will report anxiety at manageable levels: Administer medication as ordered     Problem: Coping  Goal: Pt/Family able to verbalize concerns and demonstrate effective coping strategies  Description: INTERVENTIONS:  1.  Assist patient/family to identify coping skills, available support systems and cultural and spiritual values  2. Provide emotional support, including active listening and acknowledgement of concerns of patient and caregivers  3. Reduce environmental stimuli, as able  4. Instruct patient/family in relaxation techniques, as appropriate  5. Assess for spiritual pain/suffering and initiate Spiritual Care, Psychosocial Clinical Specialist consults as needed  Outcome: Progressing  Flowsheets  Taken 9/29/2022 0907 by Sharon Wilson RN  Patient/family able to verbalize anxieties, fears, and concerns, and demonstrate effective coping:   Assist patient/family to identify coping skills, available support systems and cultural and spiritual values   Provide emotional support, including active listening and acknowledgement of concerns of patient and caregivers        Care plan reviewed with patient. Patient verbalizes understanding of the plan of care and contributes to goal setting.

## 2022-09-29 NOTE — PROGRESS NOTES
Reported off to Primary Nurse Quyen PORTILLO  S.  [facility]

## 2022-09-29 NOTE — PROGRESS NOTES
Comprehensive Nutrition Assessment    Type and Reason for Visit:  Initial, Positive Nutrition Screen (weight loss, poor po)    Nutrition Recommendations/Plan:   Recommend diet as tolerated - pt. Selects food per preference. Will send Kefir TID. Encouraged po, good nutrition at best efforts. Discussed use of ONS if intake were to decline. Recommend MVI. Rx per MD to assist w/ anxiety, depression. Malnutrition Assessment:  Malnutrition Status: At risk for malnutrition (Comment) (09/29/22 1131)    Context:  Acute Illness     Findings of the 6 clinical characteristics of malnutrition:  Energy Intake:  Mild decrease in energy intake (Comment)  Weight Loss:  Unable to assess (some weight loss was intentional)     Body Fat Loss:  No significant body fat loss     Muscle Mass Loss:  No significant muscle mass loss    Fluid Accumulation:  Unable to assess     Strength:  Not Performed    Nutrition Assessment:     Pt. nutritionally compromised AEB decreased appetite and intake pta d/t anxiety, depression. At risk for further nutrition compromise r/t pancreatitis  and underlying medical condition (hx GERD, HLD, anxiety). Nutrition Related Findings:      Wound Type: None     Pt. Report/Treatments/Miscellaneous: pt. Seen - reports decreased appetite and intake for a few months - attributes to anxiety, depression; states still tries to consume 3 meals/day; reports initially was trying to lose weight - cut out gluten and dairy as feels he might be intolerant; watches sugar intake as states it's bad for the gut; didn't want Ensure Clear d/t sugar content; Psych following  GI Status: BM 9/27; denies any belly pain at present  Pertinent Labs: 9/29: Glucose 97, BUN 12, Cr 0.9, Lipase 145.9, Triglycerides 107  Pertinent Meds: Vitamin D, Remeron, Celexa, Clonazepam, Zofran      Current Nutrition Intake & Therapies:          ADULT DIET; Regular  ADULT ORAL NUTRITION SUPPLEMENT; Breakfast, Lunch, Dinner;  Other Oral Supplement; 4 oz. Kefir TID    Anthropometric Measures:  Height: 5' 9\" (175.3 cm)  Ideal Body Weight (IBW): 160 lbs (73 kg)    Admission Body Weight: 190 lb (86.2 kg) (9/28 no edema (? stated weight))  Current Body Weight: 190 lb (86.2 kg) (9/28 no edema (? stated weight)),       Current BMI (kg/m2): 28  Usual Body Weight:  (per pt. used to weigh 240# - tried to lose weight - got down to 200#; then lost more weight d/t stress, depression; per EMR: 8/9/14: 211# 9.6 oz, 11/2/15: 222# 3.2 oz, 6/23/22: 203# 5 oz)                            Estimated Daily Nutrient Needs:  Energy Requirements Based On: Kcal/kg  Weight Used for Energy Requirements: Other (Comment) (86 kgm)  Energy (kcal/day): 7865-8401 kcals (20-25)  Weight Used for Protein Requirements: Ideal (73 kgm)  Protein (g/day):  grams (1.2-1.5)       Nutrition Diagnosis:   Inadequate oral intake related to psychological cause or life stress as evidenced by poor intake prior to admission    Nutrition Interventions:   Food and/or Nutrient Delivery: Continue Current Diet, Start Oral Nutrition Supplement  Nutrition Education/Counseling: Education initiated (9/29 Encouraged po, good nutrition at best efforts. Discussed use of ONS if intake declines.)  Coordination of Nutrition Care: Continue to monitor while inpatient       Goals:     Goals: PO intake 75% or greater, by next RD assessment       Nutrition Monitoring and Evaluation:      Food/Nutrient Intake Outcomes: Diet Advancement/Tolerance, Food and Nutrient Intake, Supplement Intake  Physical Signs/Symptoms Outcomes: Biochemical Data, Chewing or Swallowing, GI Status, Fluid Status or Edema, Nutrition Focused Physical Findings, Skin, Weight    Discharge Planning:     Too soon to determine     Almaz ZANE Weldon, LD  Contact: [phone]

## 2022-09-29 NOTE — PLAN OF CARE
Problem: Skin/Tissue Integrity  Goal: Absence of new skin breakdown  Description: 1. Monitor for areas of redness and/or skin breakdown  2. Assess vascular access sites hourly  3. Every 4-6 hours minimum:  Change oxygen saturation probe site  4. Every 4-6 hours:  If on nasal continuous positive airway pressure, respiratory therapy assess nares and determine need for appliance change or resting period. 9/28/2022 2303 by Madeleine Savage RN  Outcome: Progressing  Note: Patient has no new skin issues at this time. Patient encouraged to reposition every two hours. Skin assessment completed and ongoing.         Problem: ABCDS Injury Assessment  Goal: Absence of physical injury  9/28/2022 2303 by Madeleine Savage RN  Outcome: Progressing  Flowsheets (Taken 9/28/2022 2303)  Absence of Physical Injury: Implement safety measures based on patient assessment     Problem: Safety - Adult  Goal: Free from fall injury  9/28/2022 2303 by Madeleine Savage RN  Outcome: Progressing  Flowsheets (Taken 9/28/2022 2303)  Free From Fall Injury: Instruct family/caregiver on patient safety     Problem: Skin/Tissue Integrity - Adult  Goal: Skin integrity remains intact  9/28/2022 2303 by Madeleine Savage RN  Outcome: Progressing  Flowsheets (Taken 9/28/2022 2302)  Skin Integrity Remains Intact: Monitor for areas of redness and/or skin breakdown     Problem: Skin/Tissue Integrity - Adult  Goal: Oral mucous membranes remain intact  9/28/2022 2303 by Madeleine Savage RN  Outcome: Progressing     Problem: Musculoskeletal - Adult  Goal: Return mobility to safest level of function  9/28/2022 2303 by Madeleine Savage RN  Outcome: Progressing  Flowsheets (Taken 9/28/2022 2303)  Return Mobility to Safest Level of Function:   Assess patient stability and activity tolerance for standing, transferring and ambulating with or without assistive devices   Assist with transfers and ambulation using safe patient handling equipment as needed   Care plan reviewed with patient. Patient verbalize understanding of the plan of care and contribute to goal setting.     Electronically signed by Salome Tierney RN on 9/28/2022 at 11:04 PM

## 2022-09-29 NOTE — PROGRESS NOTES
Received report from primary nurse Quyen PORTILLO    S.  [facility] SN

## 2022-09-29 NOTE — PROGRESS NOTES
Hospitalist Progress Note    Patient:  Thompson Guzmán      Unit/Bed:5K-23/023-A    YOB: 1965    MRN: [id number]       Acct: [de-identified]     PCP: ENRIQUE Matias CNP    Date of Admission: 9/27/2022    Assessment/Plan:      Pancreatitis:   -Lipase improved to 145.9 compared to 505.0  -Continue IVF hydration    Anxiety/depression:   -Psychiatry following  -Psychiatry evaluated, per Dr. Carranza Hence attestation, \"Please consider adding clonazepam 0.5 mg twice daily to see if that can help with severe breakthrough anxiety. \"   -Clonazepam 0.5 mg BID PRN ordered. Polydipsia:   -A1C 5.8    Vitamin D deficiency:    -Continue Vitamin D supplementation      Chief Complaint: Uncontrolled anxiety and depression      Subjective: 55 y.o. male admitted to the hospitalist service for pancreatitis. Patient denies vomiting. He endorses a bowel movement. Medications:    Infusion Medications    lactated ringers 150 mL/hr at 09/28/22 1600    sodium chloride       Scheduled Medications    citalopram  20 mg Oral Daily    mirtazapine  30 mg Oral Nightly    propranolol  20 mg Oral BID    Vitamin D  1,000 Units Oral BID    sodium chloride flush  5-40 mL IntraVENous 2 times per day    enoxaparin  40 mg SubCUTAneous Daily     PRN Meds: hydrOXYzine HCl, sodium chloride flush, sodium chloride, ondansetron **OR** ondansetron, clonazePAM    No intake or output data in the 24 hours ending 09/29/22 0858    Diet:  ADULT DIET; Regular  ADULT ORAL NUTRITION SUPPLEMENT; Breakfast, Lunch, Dinner; Clear Liquid Oral Supplement    Review of Systems   HENT:  Negative for sore throat. Respiratory:  Negative for shortness of breath. Cardiovascular:  Negative for chest pain. Exam:  /75   Pulse 67   Temp 99.1 °F (37.3 °C) (Oral)   Resp 16   Ht 5' 9\" (1.753 m)   Wt 190 lb (86.2 kg)   SpO2 96%   BMI 28.06 kg/m²     Physical Exam  Constitutional:       General: He is not in acute distress.   Cardiovascular: Rate and Rhythm: Normal rate and regular rhythm. Heart sounds: No murmur heard. Abdominal:      Palpations: Abdomen is soft. Tenderness: There is no abdominal tenderness. Musculoskeletal:      Comments: MASON x 4   Neurological:      Mental Status: He is alert. GCS: GCS eye subscore is 4. GCS verbal subscore is 5. GCS motor subscore is 6. Psychiatric:         Speech: He is communicative. Behavior: Behavior is cooperative. Labs:   Recent Labs     09/27/22 2013 09/28/22 0459 09/29/22 0527   WBC 8.1 6.7 5.8   HGB 14.5 14.8 13.6*   HCT 42.7 44.0 40.2*    217 209     Recent Labs     09/27/22 2013 09/28/22 0459 09/29/22 0527    136 142   K 4.2 4.3 4.6    102 108   CO2 26 24 27   BUN 16 13 12   CREATININE 0.8 0.8 0.9   CALCIUM 9.1 8.9 8.8     Recent Labs     09/27/22 2013   AST 12   ALT 13   BILITOT 0.3   ALKPHOS 29*     No results for input(s): INR in the last 72 hours. No results for input(s): Lyndsay Martín in the last 72 hours. Urinalysis:      Lab Results   Component Value Date/Time    NITRU NEGATIVE 09/27/2022 11:35 PM    WBCUA 0 12/02/2014 02:34 AM    BACTERIA NONE 12/02/2014 02:34 AM    RBCUA 0 12/02/2014 02:34 AM    BLOODU NEGATIVE 09/27/2022 11:35 PM    SPECGRAV <=1.005 12/02/2014 02:34 AM    GLUCOSEU NEGATIVE 09/27/2022 11:35 PM       Radiology:  US ABDOMEN LIMITED   Final Result   No acute pathology. No sonographic evidence of acute cholecystitis. No findings to suggest biliary obstruction. This document has been electronically signed by: Christiano Mueller MD on    09/28/2022 05:48 AM      CT ABDOMEN PELVIS W IV CONTRAST Additional Contrast? None   Final Result   1. Likely edema of the uncinate process of the pancreas. Suspect    pancreatitis. 2. Mildly enlarged prostate.       This document has been electronically signed by: Raul Gonzalez MD on    09/27/2022 11:53 PM      All CTs at this facility use dose modulation techniques and iterative    reconstructions, and/or weight-based dosing   when appropriate to reduce radiation to a low as reasonably achievable. Diet: ADULT DIET;  Regular  ADULT ORAL NUTRITION SUPPLEMENT; Breakfast, Lunch, Dinner; Clear Liquid Oral Supplement    DVT prophylaxis: [] Lovenox                                 [] SCDs                                 [] SQ Heparin                                 [] Encourage ambulation           [] Already on Anticoagulation     Disposition:    [] Home       [] TCU       [] Rehab       [] Psych       [] SNF       [] [place]       [] Other-    Code Status: Full Code    PT/OT Eval:         Electronically signed by Argentina Delgado PA-C on 9/29/2022 at 8:58 AM

## 2022-09-30 VITALS
RESPIRATION RATE: 16 BRPM | BODY MASS INDEX: 28.14 KG/M2 | OXYGEN SATURATION: 96 % | WEIGHT: 190 LBS | SYSTOLIC BLOOD PRESSURE: 153 MMHG | TEMPERATURE: 98.4 F | HEART RATE: 66 BPM | DIASTOLIC BLOOD PRESSURE: 85 MMHG | HEIGHT: 69 IN

## 2022-09-30 LAB
ANION GAP SERPL CALCULATED.3IONS-SCNC: 11 MEQ/L (ref 8–16)
BASOPHILS # BLD: 0.7 %
BASOPHILS ABSOLUTE: 0 THOU/MM3 (ref 0–0.1)
BUN BLDV-MCNC: 13 MG/DL (ref 7–22)
CALCIUM SERPL-MCNC: 9 MG/DL (ref 8.5–10.5)
CHLORIDE BLD-SCNC: 107 MEQ/L (ref 98–111)
CO2: 24 MEQ/L (ref 23–33)
CREAT SERPL-MCNC: 0.8 MG/DL (ref 0.4–1.2)
EOSINOPHIL # BLD: 6.6 %
EOSINOPHILS ABSOLUTE: 0.4 THOU/MM3 (ref 0–0.4)
ERYTHROCYTE [DISTWIDTH] IN BLOOD BY AUTOMATED COUNT: 12.4 % (ref 11.5–14.5)
ERYTHROCYTE [DISTWIDTH] IN BLOOD BY AUTOMATED COUNT: 40.5 FL (ref 35–45)
GFR SERPL CREATININE-BSD FRML MDRD: > 90 ML/MIN/1.73M2
GLUCOSE BLD-MCNC: 87 MG/DL (ref 70–108)
HCT VFR BLD CALC: 42.4 % (ref 42–52)
HEMOGLOBIN: 13.8 GM/DL (ref 14–18)
IMMATURE GRANS (ABS): 0.01 THOU/MM3 (ref 0–0.07)
IMMATURE GRANULOCYTES: 0.2 %
LYMPHOCYTES # BLD: 36.6 %
LYMPHOCYTES ABSOLUTE: 2.2 THOU/MM3 (ref 1–4.8)
MCH RBC QN AUTO: 29.6 PG (ref 26–33)
MCHC RBC AUTO-ENTMCNC: 32.5 GM/DL (ref 32.2–35.5)
MCV RBC AUTO: 90.8 FL (ref 80–94)
MONOCYTES # BLD: 7.1 %
MONOCYTES ABSOLUTE: 0.4 THOU/MM3 (ref 0.4–1.3)
NUCLEATED RED BLOOD CELLS: 0 /100 WBC
PLATELET # BLD: 216 THOU/MM3 (ref 130–400)
PMV BLD AUTO: 11 FL (ref 9.4–12.4)
POTASSIUM SERPL-SCNC: 4.5 MEQ/L (ref 3.5–5.2)
RBC # BLD: 4.67 MILL/MM3 (ref 4.7–6.1)
SEG NEUTROPHILS: 48.8 %
SEGMENTED NEUTROPHILS ABSOLUTE COUNT: 2.9 THOU/MM3 (ref 1.8–7.7)
SODIUM BLD-SCNC: 142 MEQ/L (ref 135–145)
WBC # BLD: 6 THOU/MM3 (ref 4.8–10.8)

## 2022-09-30 PROCEDURE — 94760 N-INVAS EAR/PLS OXIMETRY 1: CPT

## 2022-09-30 PROCEDURE — 80048 BASIC METABOLIC PNL TOTAL CA: CPT

## 2022-09-30 PROCEDURE — 6360000002 HC RX W HCPCS: Performed by: STUDENT IN AN ORGANIZED HEALTH CARE EDUCATION/TRAINING PROGRAM

## 2022-09-30 PROCEDURE — 85025 COMPLETE CBC W/AUTO DIFF WBC: CPT

## 2022-09-30 PROCEDURE — 6370000000 HC RX 637 (ALT 250 FOR IP): Performed by: STUDENT IN AN ORGANIZED HEALTH CARE EDUCATION/TRAINING PROGRAM

## 2022-09-30 PROCEDURE — 6370000000 HC RX 637 (ALT 250 FOR IP): Performed by: PHYSICIAN ASSISTANT

## 2022-09-30 PROCEDURE — 36415 COLL VENOUS BLD VENIPUNCTURE: CPT

## 2022-09-30 PROCEDURE — 99231 SBSQ HOSP IP/OBS SF/LOW 25: CPT | Performed by: PHYSICIAN ASSISTANT

## 2022-09-30 PROCEDURE — 2580000003 HC RX 258: Performed by: STUDENT IN AN ORGANIZED HEALTH CARE EDUCATION/TRAINING PROGRAM

## 2022-09-30 RX ORDER — CLONAZEPAM 0.5 MG/1
0.5 TABLET ORAL 2 TIMES DAILY PRN
Qty: 60 TABLET | Refills: 0 | Status: SHIPPED | OUTPATIENT
Start: 2022-09-30 | End: 2022-10-13 | Stop reason: ALTCHOICE

## 2022-09-30 RX ADMIN — SODIUM CHLORIDE, POTASSIUM CHLORIDE, SODIUM LACTATE AND CALCIUM CHLORIDE: 600; 310; 30; 20 INJECTION, SOLUTION INTRAVENOUS at 06:13

## 2022-09-30 RX ADMIN — SODIUM CHLORIDE, PRESERVATIVE FREE 10 ML: 5 INJECTION INTRAVENOUS at 09:09

## 2022-09-30 RX ADMIN — Medication 1000 UNITS: at 08:56

## 2022-09-30 RX ADMIN — DOCUSATE SODIUM 100 MG: 100 CAPSULE, LIQUID FILLED ORAL at 08:57

## 2022-09-30 RX ADMIN — SODIUM CHLORIDE, POTASSIUM CHLORIDE, SODIUM LACTATE AND CALCIUM CHLORIDE: 600; 310; 30; 20 INJECTION, SOLUTION INTRAVENOUS at 00:01

## 2022-09-30 RX ADMIN — CLONAZEPAM 0.5 MG: 0.5 TABLET ORAL at 08:57

## 2022-09-30 RX ADMIN — ENOXAPARIN SODIUM 40 MG: 100 INJECTION SUBCUTANEOUS at 08:57

## 2022-09-30 RX ADMIN — PROPRANOLOL HYDROCHLORIDE 20 MG: 20 TABLET ORAL at 08:57

## 2022-09-30 RX ADMIN — POLYETHYLENE GLYCOL 3350 17 G: 17 POWDER, FOR SOLUTION ORAL at 09:06

## 2022-09-30 RX ADMIN — CITALOPRAM HYDROBROMIDE 20 MG: 20 TABLET ORAL at 08:57

## 2022-09-30 ASSESSMENT — ENCOUNTER SYMPTOMS
SHORTNESS OF BREATH: 0
SORE THROAT: 0

## 2022-09-30 NOTE — DISCHARGE SUMMARY
Hospital Medicine Discharge Summary      Patient Identification:   Rolo Eugene   : 1965  MRN: [id number]   Account: [de-identified]      Patient's PCP: ENRIQUE Sparrow CNP    Admit Date: 2022     Discharge Date:     Admitting Physician: Dina Magaña MD     Discharge Physician: Demetrice Maddox PA-C     Rolo Eugene is a 55 y.o. male admitted to [facility] on 2022. HPI On Admission From H&P:  \"Mr. Rolo Eugene is a 55 y.o. male with PMHx of anxiety, depression who came in today due to concern for uncontrolled anxiety and depression. He has been feeling tired, anxious and nauseous since last couple of days. No SI. No plan to hurt himself. No HI. Reports chills but no fever. He reports increased thirst. He denies abdominal pain but reports being uncomfortable and it making noise. His psychiatrist is NP named Gm Boyer who works under Dr. Rizwana Marin. He reports that he is stressed because of back pain and bottom of feet pain but no personal stressors. No alcohol use. At ED, he was found to have lipase of 505 and CT abdomen pelvis revealing of edema of uncinate process of pancreas suggestive of pancreatitis. \"      Assessment/Plan With Discharge Diagnoses:    Pancreatitis:   -Lipase improved to 145.9 compared to 505.0  -Continue IVF hydration     Anxiety/depression:   -Psychiatry following  -Psychiatry evaluated, per Dr. Jossy Petersen attestation, \"Please consider adding clonazepam 0.5 mg twice daily to see if that can help with severe breakthrough anxiety. \"   -Clonazepam 0.5 mg BID PRN ordered.      Polydipsia:   -A1C 5.8     Vitamin D deficiency:               -Continue Vitamin D supplementation    Exam:     Vitals:  Vitals:    22 1515 22 2109 22 0430 22 0830   BP: (!) 143/78 131/71 132/80 (!) 153/85   Pulse: 66 63 55 66   Resp: 16 16 16 16   Temp: 98.3 °F (36.8 °C) 98.6 °F (37 °C) 97.7 °F (36.5 °C) 98.4 °F (36.9 °C)   TempSrc: Oral Oral Oral Oral SpO2: 95% 96% 97% 96%   Weight:       Height:         Weight: Weight: 190 lb (86.2 kg)     24 hour intake/output:No intake or output data in the 24 hours ending 09/30/22 1353      Labs: For convenience and continuity at follow-up the following most recent labs are provided:    CBC:    Lab Results   Component Value Date/Time    WBC 6.0 09/30/2022 04:17 AM    HGB 13.8 09/30/2022 04:17 AM    HCT 42.4 09/30/2022 04:17 AM     09/30/2022 04:17 AM       Renal:    Lab Results   Component Value Date/Time     09/30/2022 04:17 AM    K 4.5 09/30/2022 04:17 AM     09/30/2022 04:17 AM    CO2 24 09/30/2022 04:17 AM    BUN 13 09/30/2022 04:17 AM    CREATININE 0.8 09/30/2022 04:17 AM    CALCIUM 9.0 09/30/2022 04:17 AM         Significant Diagnostic Studies    Radiology:   US ABDOMEN LIMITED   Final Result   No acute pathology. No sonographic evidence of acute cholecystitis. No findings to suggest biliary obstruction. This document has been electronically signed by: Antione Bills MD on    09/28/2022 05:48 AM      CT ABDOMEN PELVIS W IV CONTRAST Additional Contrast? None   Final Result   1. Likely edema of the uncinate process of the pancreas. Suspect    pancreatitis. 2. Mildly enlarged prostate. This document has been electronically signed by: Niya Reid MD on    09/27/2022 11:53 PM      All CTs at this facility use dose modulation techniques and iterative    reconstructions, and/or weight-based dosing   when appropriate to reduce radiation to a low as reasonably achievable. Consults:     IP CONSULT TO PSYCHIATRY    Disposition: Home  Condition at Discharge: Stable    Code Status:  Full Code    Patient Instructions:    Discharge lab work: Activity: activity as tolerated  Diet: ADULT DIET; Regular  ADULT ORAL NUTRITION SUPPLEMENT; Breakfast, Lunch, Dinner; Other Oral Supplement; 4 oz. Kefir TID      Follow-up visits:   [facility]  [address] [place] [address]  [phone]  Go on 10/7/2022  Appointment as previously scheduled. Can utilize walk-in clinic if needed before scheduled apt. Discharge Medications:        Medication List        START taking these medications      clonazePAM 0.5 MG tablet  Commonly known as: KLONOPIN  Take 1 tablet by mouth 2 times daily as needed for Anxiety for up to 30 days. CONTINUE taking these medications      citalopram 20 MG tablet  Commonly known as: CELEXA     GARLIC PO     hydrOXYzine HCl 50 MG tablet  Commonly known as: ATARAX     OLIVE LEAF PO     propranolol 10 MG tablet  Commonly known as: INDERAL     vitamin C 500 MG tablet  Commonly known as: ASCORBIC ACID     Vitamin D 1000 units Caps capsule  Commonly known as: CHOLECALCIFEROL            STOP taking these medications      mirtazapine 30 MG tablet  Commonly known as: REMERON     NONFORMULARY               Where to Get Your Medications        These medications were sent to [address] [place] [address] , [address], [address] [address]      Phone: [phone]   clonazePAM 0.5 MG tablet          Time Spent on discharge is <30 minutes. Thank you ENRIQUE Lozano CNP for the opportunity to be involved in this patient's care.       Signed:    Electronically signed by Leobardo Canales PA-C on 9/30/22 at 1:53 PM EDT

## 2022-09-30 NOTE — PLAN OF CARE
Problem: Skin/Tissue Integrity  Goal: Absence of new skin breakdown  Description: 1. Monitor for areas of redness and/or skin breakdown  2. Assess vascular access sites hourly  3. Every 4-6 hours minimum:  Change oxygen saturation probe site  4. Every 4-6 hours:  If on nasal continuous positive airway pressure, respiratory therapy assess nares and determine need for appliance change or resting period. Outcome: Progressing     Problem: ABCDS Injury Assessment  Goal: Absence of physical injury  Outcome: Progressing     Problem: Safety - Adult  Goal: Free from fall injury  Outcome: Progressing     Problem: Skin/Tissue Integrity - Adult  Goal: Skin integrity remains intact  Outcome: Progressing  Goal: Oral mucous membranes remain intact  Outcome: Progressing     Problem: Musculoskeletal - Adult  Goal: Return mobility to safest level of function  Outcome: Progressing  Goal: Return ADL status to a safe level of function  Outcome: Progressing     Problem: Gastrointestinal - Adult  Goal: Maintains adequate nutritional intake  Outcome: Progressing  Goal: Establish and maintain optimal ostomy function  Outcome: Progressing     Problem: Anxiety  Goal: Will report anxiety at manageable levels  Description: INTERVENTIONS:  1. Administer medication as ordered  2. Teach and rehearse alternative coping skills  3. Provide emotional support with 1:1 interaction with staff  Outcome: Progressing     Problem: Coping  Goal: Pt/Family able to verbalize concerns and demonstrate effective coping strategies  Description: INTERVENTIONS:  1. Assist patient/family to identify coping skills, available support systems and cultural and spiritual values  2. Provide emotional support, including active listening and acknowledgement of concerns of patient and caregivers  3. Reduce environmental stimuli, as able  4. Instruct patient/family in relaxation techniques, as appropriate  5.  Assess for spiritual pain/suffering and initiate Spiritual Care, Psychosocial Clinical Specialist consults as needed  Outcome: Progressing     Problem: Nutrition Deficit:  Goal: Optimize nutritional status  Outcome: Progressing   Care plan reviewed with patient. Patient verbalize understanding of the plan of care and contribute to goal setting.

## 2022-09-30 NOTE — PROGRESS NOTES
Department of Psychiatry  Progress Note     Chief Complaint:  anxiety and depression     PROGRESS:  Ian Nova reports he is worried about going home. He states they have been talking about possibly discharging him today. When asked why he is worried about going home, he states that he is concerned that he will not be able to control his anxiety. He says he has always had trouble with anxiety. He mentions \"my brain is my worst enemy. \"  He appears to have a lot of anticipatory anxiety at this time. I asked the patient if he had any coping skills that he utilizes at home. He states he normally will watch TV, go for a walk or try to distract himself when he is feeling anxious. He states he was not using his coping skills at home prior to admission because he was \"so worn out. \"He feels that his \"adrenal glands are drained. \" He mentions that he is also worried about returning home alone. States he was going to changing seasons in [facility] but stopped going due to his anxiety. He feels the Klonopin has been helping to an extent. He is concerned because he has been waking up at night with an erection. He states he was on trazodone in the past and it did the same thing to him. He is unsure if it is the Klonopin or Remeron causing this. His depression is still there. He states it is about the same as it was upon admission. He denies any suicidal thoughts today. He does state he has been having passive suicidal thoughts that come and go. Denies having any suicidal thoughts with plan or intent and contracts for safety on the unit. He does endorse feeling hopeless and helpless at times about his anxiety. Discussed the importance of counseling in the treatment of anxiety with him. He states he has a counseling appointment on the 10th or 11th at [facility]. He reports he slept good last night but woke up a few times throughout the night. His appetite has been good.   He has been compliant with his abnormalities noted  Attitude toward examiner:  cooperative, attentive and good eye contact  Speech:  Spontaneous, normal rate and volume  Mood:  anxious  Affect: mood congruent  Thought processes:  Linear, goal directed, coherent  Thought content: Denies active suicidal ideation              denies homicidal ideations               Denies hallucinations              Denies delusions  Cognition:  Oriented to self, situation, location, date  Concentration clinically adequate  Memory age appropriate  Insight & Judgment:  fair      ASSESSMENT  BISI  Panic disorder  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 Please call back with any further questions        Electronically signed by Helen Christian PA-C on 9/30/2022 at 11:01 AM Reviewed patient's current plan of care and vital signs with nursing staff. **This report has been created using voice recognition software. It may contain minor errors which are inherent in voice recognition technology. **

## 2022-09-30 NOTE — CARE COORDINATION
9/30/22, 1:39 PM EDT    Patient goals/plan/ treatment preferences discussed by  and . Patient goals/plan/ treatment preferences reviewed with patient/ family. Patient/ family verbalize understanding of discharge plan and are in agreement with goal/plan/treatment preferences. Understanding was demonstrated using the teach back method. AVS provided by RN at time of discharge, which includes all necessary medical information pertaining to the patients current course of illness, treatment, post-discharge goals of care, and treatment preferences. Discharging home today. No plans for 4E admission. Current with [facility] and information re: walk-in clinic added AVS     Services At/After Discharge:  Outpatient

## 2022-10-11 ENCOUNTER — TELEPHONE (OUTPATIENT)
Dept: ALLERGY | Age: 57
End: 2022-10-11

## 2022-10-11 NOTE — TELEPHONE ENCOUNTER
Pt called in stating he is very anxious and nervous about allergy testing. Pt asked what it all entailed. Explained to pt the procedure. Pt asked what medications he should be off of and did clarify he needs to be off atarax and pt states he is off of medication for allergy testing.

## 2022-10-13 ENCOUNTER — NURSE ONLY (OUTPATIENT)
Dept: LAB | Age: 57
End: 2022-10-13

## 2022-10-13 ENCOUNTER — PROCEDURE VISIT (OUTPATIENT)
Dept: ALLERGY | Age: 57
End: 2022-10-13
Payer: COMMERCIAL

## 2022-10-13 VITALS
DIASTOLIC BLOOD PRESSURE: 72 MMHG | SYSTOLIC BLOOD PRESSURE: 132 MMHG | HEART RATE: 71 BPM | RESPIRATION RATE: 18 BRPM | OXYGEN SATURATION: 97 % | TEMPERATURE: 97.3 F

## 2022-10-13 DIAGNOSIS — Z91.018 FOOD ALLERGY: Primary | ICD-10-CM

## 2022-10-13 DIAGNOSIS — Z91.018 FOOD ALLERGY: ICD-10-CM

## 2022-10-13 DIAGNOSIS — Z91.09 MITE ALLERGY: ICD-10-CM

## 2022-10-13 DIAGNOSIS — Z91.038 ALLERGY TO COCKROACHES: ICD-10-CM

## 2022-10-13 DIAGNOSIS — R40.0 SOMNOLENCE, DAYTIME: ICD-10-CM

## 2022-10-13 DIAGNOSIS — G47.00 INSOMNIA, UNSPECIFIED TYPE: ICD-10-CM

## 2022-10-13 DIAGNOSIS — J30.81 CAT ALLERGIES: ICD-10-CM

## 2022-10-13 DIAGNOSIS — J30.1 ACUTE SEASONAL ALLERGIC RHINITIS DUE TO POLLEN: ICD-10-CM

## 2022-10-13 PROCEDURE — 95004 PERQ TESTS W/ALRGNC XTRCS: CPT | Performed by: NURSE PRACTITIONER

## 2022-10-13 PROCEDURE — 99214 OFFICE O/P EST MOD 30 MIN: CPT | Performed by: NURSE PRACTITIONER

## 2022-10-13 RX ORDER — MIRTAZAPINE 30 MG/1
30 TABLET, FILM COATED ORAL NIGHTLY
COMMUNITY

## 2022-10-13 ASSESSMENT — ENCOUNTER SYMPTOMS
FACIAL SWELLING: 0
SHORTNESS OF BREATH: 0
APNEA: 0
TROUBLE SWALLOWING: 0
CHOKING: 0
RHINORRHEA: 1
COLOR CHANGE: 0
SINUS PRESSURE: 1
NAUSEA: 0
EYE PAIN: 1
VOMITING: 0
SORE THROAT: 0
CHEST TIGHTNESS: 0
STRIDOR: 0
DIARRHEA: 0
WHEEZING: 0
COUGH: 0
EYE REDNESS: 1
ABDOMINAL PAIN: 0
VOICE CHANGE: 0

## 2022-10-13 NOTE — PROGRESS NOTES
@[facility]@    Allergy & Asthma   [address] [facility], [address]  Ph:   [phone]  Fax:[phone]    Provider:  Dr. Terence Del Cid:   Chief Complaint   Patient presents with    Allergy Testing    Results     Lab review           HISTORY OF PRESENT ILLNESS: ESTABLISHED PATIENT HERE FOR EVALUATION   16-year-old  male here today for follow-up on allergies. Patient would like his labs results. He denies any nausea, vomiting fever. Patient also has food allergies. Complains of feeling poorly and having abdominal pain and GI distress whenever he eats certain foods containing gluten. Onset of GI distress following eating foods has been for several years. Patient states is progressively gotten worse. He notices that whenever he eats wheat products or items that contain gluten he does feel worse. Only thing he is down is avoiding those foods which has helped some. However he would like to be tested because he wants to know what he can and cannot eat. Food - bloating and constipation. Thinks is related to gluten and dairy. Denies vomiting. Has some nausea at times. He states he has a leaky gut but was not diagnosed by a GI doctor. His GI doctor diagnosed him with IBS. This occurred a few years ago. Onset has been the last several years. Moderate in severity. Avoids gluten and dairy. Has lost 30 lbs related to changing his diet. He has tried eliminating gluten from his diet. Of importance patient recently has been hospitalized for pancreatitis. He does not know why he had pancreatitis. He denies having any nausea or vomiting today. Pancreatitis was back in August.  She states since then it has been treated and is not having any problems today. Allergic rhinitis - headaches, runny nose, sores beneath his nose, has had allergies all his life. Patient complains of eyes that itch swell, burn, tear and has discharge at times.   He states he has a runny nose that has sores in it. He states at times he will get yellow-green drainage from his nose. His throat stays sore from postnasal drip, he does frequent throat clearing and has hoarseness. He does get headaches but states that since he changed his diet to be gluten free his headaches are a lot less. He has eczema, at times he gets rashes on the scalp. Patient states allergies occur all year. Dampness, fragrances and odors, weeds, trees, dust, mold and milk makes his allergies worse. He does get sinus infections occasionally. He states in 2021 he had sinus surgery Dr. Kalin Ramos corrected a deviated septum. He takes hydrocortisone from his naturopathic doctor for inflammation. He dose use several OTC \"natural\" medications. Does not use nasal sprays and does not use allergy medication. Severity is moderate in severity. He does not like to use medication as he feels this chases the symptoms and they have a lot of side effects. Has had allergies all his life. He does \"ozone therapy\" for sinuses which cleans them out. Dr Santino Griffin recently corrected his deviated septum in summer 2021. Has heavy metals. Patient did not elaborate which heavy metals. He receives treatment by another provider. .  Does celation therapy with Dr Brady Oliver. [facility] in [place]     [employer] patient is an . He does not smoke. He does not use recreational drugs. He does not consume alcohol. He does have a cat and a dog. He has had pets all his life. His home is partially carpeted. He sleeps on a standard mattress. He is alert lived in [place] current [place] all his life. Review of Systems:  Review of Systems   Constitutional:  Negative for activity change, appetite change, chills, diaphoresis, fatigue, fever and unexpected weight change. HENT:  Positive for congestion, postnasal drip, rhinorrhea and sinus pressure.  Negative for dental problem, ear discharge, ear pain, facial swelling, hearing loss, mouth sores, sneezing, sore throat, tinnitus, trouble swallowing and voice change. Eyes:  Positive for pain and redness. Negative for visual disturbance. Respiratory:  Negative for apnea, cough, choking, chest tightness, shortness of breath, wheezing and stridor. Cardiovascular:  Negative for chest pain, palpitations and leg swelling. Gastrointestinal:  Negative for abdominal pain, diarrhea, nausea and vomiting. Endocrine: Negative for cold intolerance, heat intolerance, polydipsia and polyuria. Genitourinary:  Negative for dysuria, enuresis and hematuria. Musculoskeletal:  Negative for arthralgias, gait problem, neck pain and neck stiffness. Skin:  Positive for rash. Negative for color change. Allergic/Immunologic: Negative for environmental allergies, food allergies and immunocompromised state. Neurological:  Positive for headaches. Negative for dizziness, syncope, facial asymmetry, speech difficulty and light-headedness. Hematological:  Negative for adenopathy. Does not bruise/bleed easily. Psychiatric/Behavioral:  Negative for confusion and sleep disturbance. The patient is not nervous/anxious. All other systems reviewed and are negative. Past MedicalHistory:    Past Medical History:   Diagnosis Date    Acute pancreatitis     september 2022    Anxiety     GERD (gastroesophageal reflux disease)     Hyperlipidemia     Patellar bursitis of left knee 08/08/2014    Psychiatric problem        Past Surgical History:  Past Surgical History:   Procedure Laterality Date    COLONOSCOPY  unsure    ENDOSCOPY, COLON, DIAGNOSTIC  unsure       Family History:   Family History   Problem Relation Age of Onset    Heart Disease Father        Social History:   Social History     Tobacco Use    Smoking status: Never    Smokeless tobacco: Never   Substance Use Topics    Alcohol use: No        Allergies:  Patient has no known allergies.     CurrentMedications: are equal, round, and reactive to light. Neck:      Thyroid: No thyromegaly. Cardiovascular:      Rate and Rhythm: Normal rate and regular rhythm. Heart sounds: Normal heart sounds. Pulmonary:      Effort: Pulmonary effort is normal. No respiratory distress. Breath sounds: Normal breath sounds. No wheezing or rales. Abdominal:      Palpations: Abdomen is soft. Tenderness: There is no abdominal tenderness. Musculoskeletal:         General: No tenderness. Normal range of motion. Cervical back: Normal range of motion and neck supple. Skin:     General: Skin is warm and dry. Findings: No rash. Neurological:      General: No focal deficit present. Mental Status: He is alert and oriented to person, place, and time. Mental status is at baseline. Deep Tendon Reflexes: Reflexes are normal and symmetric. Psychiatric:         Mood and Affect: Mood normal.         Behavior: Behavior normal.         Thought Content:  Thought content normal.         Judgment: Judgment normal.           DATA:  Lab Review:  CBC:   Lab Results   Component Value Date/Time    WBC 6.0 09/30/2022 04:17 AM    RBC 4.67 09/30/2022 04:17 AM    HGB 13.8 09/30/2022 04:17 AM    HCT 42.4 09/30/2022 04:17 AM    MCV 90.8 09/30/2022 04:17 AM    MCH 29.6 09/30/2022 04:17 AM    MCHC 32.5 09/30/2022 04:17 AM    RDW 14.2 12/02/2014 02:00 AM     09/30/2022 04:17 AM     BMP:    Lab Results   Component Value Date/Time    GLUCOSE 87 09/30/2022 04:17 AM     09/30/2022 04:17 AM    K 4.5 09/30/2022 04:17 AM     09/30/2022 04:17 AM    CO2 24 09/30/2022 04:17 AM    ANIONGAP 11.0 09/30/2022 04:17 AM    BUN 13 09/30/2022 04:17 AM    CREATININE 0.8 09/30/2022 04:17 AM    CALCIUM 9.0 09/30/2022 04:17 AM    LABGLOM >90 09/30/2022 04:17 AM          IgE   Date/Time Value Ref Range Status   06/23/2022 10:41 AM 85 <101 IU/mL Final     Comment:     [facility] [address] [facility], [address] [place] [address] [phone] IgG   Date/Time Value Ref Range Status   06/23/2022 10:41  700 - 1600 mg/dL Final     Comment:     [facility] [id number] [facility], [address] [place] [address] [phone]     IgA   Date/Time Value Ref Range Status   06/23/2022 10:41  70 - 400 mg/dL Final     Comment:     [facility] [id number] [facility], [address] [place] [address] [phone]      IgM   Date/Time Value Ref Range Status   06/23/2022 10:41 AM 38 (L) 40 - 230 mg/dL Final     Comment:     [facility] [address] [facility], [address] [place] [address] [phone]       No results found for: RALPH   No results found for: RF       No results found for this or any previous visit. No results found for this or any previous visit. All current and previous medial labs have been reviewed and discussed with patient         Procedures: Allergy Testing: 10/13/2022        Last use of antihistamine (or other medication affecting response to histamine): greater than one week    Patient informed of risks of skin allergy testing mild, moderate, and severe including potential for anaphylaxis. Patient wishes to proceed. Consent signed: Yes    Location: Back  Testing preformed: Percutaneous    Panel A Epictuaneous   Panel A  Epictuaneous    Site Allergen  W (mm) F  Site Allergen  W (mm) F   A1 Histamine positive control 5 10  A6 Cockroach Mix 4 7   A2 Glycerin negative control 0 3  A7 Feather Mix 0 3   A3 Dust mite Mix 4 7  A8 Mouse epithelia 0 3   A4 Cat Hair 4 6  A9 Cattle Epithelia 0 3   A5 Dog Ep.  0 3  A10 Horse Epithelia 0 3              Panel B Epictuaneous   Panel B  Epictuaneous    Site Allergen  W (mm) F  Site Allergen  W (mm) F   B11 Mundo Red/Green Tree 0 3  B16 [place] Tree 0 3   B12 [place], [place] 4 6  B17 MARIO, [place] Tree 0 3   B13 Birch mix tree 4 6  B18 [place], red Tree 0 3   B14 [employer] [facility] 4 6  B19 Kresetice, white/eastern Tree 0 3   B15 Elm mix tree 4 6  B20 [facility] Tree 0 3              Panel C Epictuaneous   Panel C Epictuaneous    Site Allergen  W (mm) F  Site Allergen  W (mm) F   C21 Filion, Black Tree 0 3  C26 Maple 0 3   C22 [place], Black Tree 0 3  C27 Nabor Grass 4 7   C23 [place] 4 7  C28 Bermuda 0 3   C24 [address] [address]  0 3  C29 7 grass mix 0 3   C25 Pecan 0 3  C30 Shwetha Grass 0 3              Panel D Epictuaneous   Panel D  Epictuaneous    Site Allergen  W (mm) F  Site Allergen  W (mm) F   D31 Panchal American. 3 6  D36 Lamb's Quarter weed 0 3   D32 Cocklebur [place] 4 7  D37 Nettle weed 0 3   D33 Kochia/Firebush weed 0 3  D38 Pigweed Rough, Redroot weed 0 3   D34 Dock-Sorrel Mix weed 0 3  D39 Plantain, English weed 4 7   D35 Ragweed mix 7 10  D40 Sagebrush, [place] weed 4 7              Panel E Epictuaneous   Panel E  Epictuaneous    Site Allergen  W (mm) F  Site Allergen  W (mm) F   E41 Russian Thistle 4 6  E46 Aureobasidium pllulans mold 0 3   E42 Mugwort Common 0 3  E47 Bipolaris/Helminthosporium . Mold 0 3   E43 Sheep Crump, Red 0 3  E48 Cladosporium herb, mold 0 3   E44 Acremoniumstrictum, Mold 0 3  E49 Cladosporium sphaero mold 0 3   E45 Aspergillus fumiga, Mold 0 3  E50 Drechsleraspicifera, mold 0 3              Panel F Epictuaneous   Panel F  Epictuaneous    Site Allergen  W (mm) F  Site Allergen  W (mm) F   F51 Penicillium chrysog Mold 0 3  F56 GS Fish mix, food 0 3   F52 Alternaria Alternata 0 3  F57 GS Shellfish Mix Food 0 3   F53 [place] Food 0 3  F58 Milk, Cow Food 0 3   F54 Corn Food 0 3  F59 Peanut Food 00 3   F55 Egg, Whole Chicken food 0 3  F60 Sesame Seed Food 0 3               Panel G Epicutaneous    Panel G Epicutaneous    Site Allergen W (mm) F  Site Allergen W (mm) F   G61 Soybean Food 0         G62 Wheat, Whole Food 0             62 Panel Skin test performed. All results interpreted as 0 mm unless noted above. Controls performed with Histamine (positive) and Glycerin (negative).   Allergy skin testing procedure was separate then eating visit and took approximately 35 minutes for procedure and explanation of results. .  This time was not counted in the educational time listed below. Assessment/Orders:    Diagnosis Orders   1. Food allergy  Miscellaneous Sendout    Miscellaneous Sendout    Allergen Wheat IgE    Allergen Gluten IgE    Barley, whole grain IgE    Corn IgE    Allergen Gluten IgE    Hops IgE    Miscellaneous Sendout    Allergen Oat IgE    Allergen Rice IgE    Rye IgE    Wheat IgE    Allergen, Food, Comprehensive Profile 1          All diagnostic imaging  have been personally reviewed     Plan:  Follow Up:as needed      May call for results - speak to [facility] or [facility]. Refer to sleep for insomnia and daytime somnolence  May take zyrtec or Claritin at night for allergies        Labs were ordered and the patient does not have results. Apparently the lab did not collect the specimen and the order had . We will go ahead and reorder labs today. Hopefully the laboratory which the patient goes to will complete the labs as ordered or notify this office if there is a problem obtaining blood work for the patient. Patient is very cooperative states she did have the labs completed. Unfortunately the lab did not perform        Spent 30 minutes of face-to-face time with the patient with well more than half of the visit being dedicated to the discussion of the various symptom problems, provided education of medications and disease process, as well as discussion of a therapeutic plan for each. Face-to-face education time does not include any time that may have been spent for procedures.     (Please note that portions of this note may have been completed with a voice recognition program.  Efforts were made to edit the dictation but occasionally words are mis-transcribed.)         Signed:  ENRIQUE Osei CNP  10/13/2022  1:28 PM

## 2022-10-19 ENCOUNTER — HOSPITAL ENCOUNTER (EMERGENCY)
Age: 57
Discharge: HOME | End: 2022-10-19
Payer: COMMERCIAL

## 2022-10-19 VITALS
DIASTOLIC BLOOD PRESSURE: 86 MMHG | RESPIRATION RATE: 16 BRPM | TEMPERATURE: 98 F | HEART RATE: 57 BPM | SYSTOLIC BLOOD PRESSURE: 133 MMHG | OXYGEN SATURATION: 97 %

## 2022-10-19 DIAGNOSIS — R10.10 UPPER ABDOMINAL PAIN: Primary | ICD-10-CM

## 2022-10-19 LAB
ABSOLUTE IMMATURE GRANULOCYTE: 0.01 THOU/MM3 (ref 0–0.07)
ALBUMIN SERPL-MCNC: 4.3 G/DL (ref 3.5–5.1)
ALP BLD-CCNC: 28 U/L (ref 38–126)
ALT SERPL-CCNC: 12 U/L (ref 11–66)
AMYLASE: 59 U/L (ref 20–104)
ANION GAP SERPL CALCULATED.3IONS-SCNC: 12 MEQ/L (ref 8–16)
AST SERPL-CCNC: 12 U/L (ref 5–40)
BASINOPHIL, AUTOMATED: 0 % (ref 0–3)
BASOPHILS ABSOLUTE: 0 THOU/MM3 (ref 0–0.1)
BILIRUB SERPL-MCNC: 0.3 MG/DL (ref 0.3–1.2)
BUN BLDV-MCNC: 16 MG/DL (ref 7–22)
CALCIUM SERPL-MCNC: 9.4 MG/DL (ref 8.5–10.5)
CHLORIDE BLD-SCNC: 97 MEQ/L (ref 98–111)
CO2: 27 MEQ/L (ref 23–33)
CREAT SERPL-MCNC: 0.9 MG/DL (ref 0.4–1.2)
EKG ATRIAL RATE: 58 BPM
EKG P AXIS: 52 DEGREES
EKG P-R INTERVAL: 156 MS
EKG Q-T INTERVAL: 434 MS
EKG QRS DURATION: 90 MS
EKG QTC CALCULATION (BAZETT): 426 MS
EKG R AXIS: 57 DEGREES
EKG T AXIS: 54 DEGREES
EKG VENTRICULAR RATE: 58 BPM
EOSINOPHILS ABSOLUTE: 0.3 THOU/MM3 (ref 0–0.4)
EOSINOPHILS RELATIVE PERCENT: 5 % (ref 0–4)
GFR SERPL CREATININE-BSD FRML MDRD: > 60 ML/MIN/1.73M2
GLUCOSE BLD-MCNC: 99 MG/DL (ref 70–108)
HCT VFR BLD CALC: 42.5 % (ref 42–52)
HEMOGLOBIN: 14.3 GM/DL (ref 14–18)
IMMATURE GRANULOCYTES: 0 %
LIPASE: 34.9 U/L (ref 5.6–51.3)
LYMPHOCYTES # BLD: 31 % (ref 15–47)
LYMPHOCYTES ABSOLUTE: 2.2 THOU/MM3 (ref 1–4.8)
MCH RBC QN AUTO: 29.1 PG (ref 26–33)
MCHC RBC AUTO-ENTMCNC: 33.6 GM/DL (ref 32.2–35.5)
MCV RBC AUTO: 86 FL (ref 80–94)
MONOCYTES ABSOLUTE: 0.5 THOU/MM3 (ref 0.4–1.3)
MONOCYTES: 7 % (ref 0–12)
OSMOLALITY CALCULATION: 273.2 MOSMOL/KG (ref 275–300)
PDW BLD-RTO: 12.4 % (ref 11.5–14.5)
PLATELET # BLD: 226 THOU/MM3 (ref 130–400)
PMV BLD AUTO: 11.3 FL (ref 9.4–12.4)
POTASSIUM REFLEX MAGNESIUM: 4.6 MEQ/L (ref 3.5–5.2)
RBC # BLD: 4.92 MILL/MM3 (ref 4.7–6.1)
SEG NEUTROPHILS: 57 % (ref 43–75)
SEGMENTED NEUTROPHILS ABSOLUTE COUNT: 3.9 THOU/MM3 (ref 1.8–7.7)
SODIUM BLD-SCNC: 136 MEQ/L (ref 135–145)
TOTAL PROTEIN: 6.9 G/DL (ref 6.1–8)
TROPONIN T: < 0.01 NG/ML
WBC # BLD: 6.9 THOU/MM3 (ref 4.8–10.8)

## 2022-10-19 PROCEDURE — 80053 COMPREHEN METABOLIC PANEL: CPT

## 2022-10-19 PROCEDURE — 93005 ELECTROCARDIOGRAM TRACING: CPT | Performed by: PHYSICIAN ASSISTANT

## 2022-10-19 PROCEDURE — 99284 EMERGENCY DEPT VISIT MOD MDM: CPT

## 2022-10-19 PROCEDURE — 83690 ASSAY OF LIPASE: CPT

## 2022-10-19 PROCEDURE — 84484 ASSAY OF TROPONIN QUANT: CPT

## 2022-10-19 PROCEDURE — 85025 COMPLETE CBC W/AUTO DIFF WBC: CPT

## 2022-10-19 PROCEDURE — 82150 ASSAY OF AMYLASE: CPT

## 2022-10-19 PROCEDURE — 93010 ELECTROCARDIOGRAM REPORT: CPT | Performed by: INTERNAL MEDICINE

## 2022-10-19 PROCEDURE — 6370000000 HC RX 637 (ALT 250 FOR IP): Performed by: PHYSICIAN ASSISTANT

## 2022-10-19 PROCEDURE — 36415 COLL VENOUS BLD VENIPUNCTURE: CPT

## 2022-10-19 RX ORDER — ONDANSETRON 4 MG/1
4 TABLET, ORALLY DISINTEGRATING ORAL ONCE
Status: DISCONTINUED | OUTPATIENT
Start: 2022-10-19 | End: 2022-10-19 | Stop reason: HOSPADM

## 2022-10-19 RX ADMIN — Medication: at 19:22

## 2022-10-19 ASSESSMENT — PAIN - FUNCTIONAL ASSESSMENT: PAIN_FUNCTIONAL_ASSESSMENT: 0-10

## 2022-10-19 ASSESSMENT — ENCOUNTER SYMPTOMS
NAUSEA: 1
ABDOMINAL PAIN: 1

## 2022-10-19 ASSESSMENT — PAIN SCALES - GENERAL: PAINLEVEL_OUTOF10: 4

## 2022-10-19 NOTE — ED PROVIDER NOTES
[facility]  eMERGENCY dEPARTMENT eNCOUnter          CHIEF COMPLAINT       Chief Complaint   Patient presents with    Abdominal Pain       Nurses Notes reviewed and I agree except as noted inthe HPI. HISTORY OF PRESENT ILLNESS    Yohan Abbott is a 65 y.o. male who presents to the Emergency Department for the evaluation of possible pancreatitis. Patient states that he was admitted for this for 4 days about 3 weeks ago. States he felt better upon discharge and was not told of any cause of the pancreatitis. States that over the past couple of days his symptoms have recurred. He describes as nausea and discomfort in the left upper quadrant/epigastric region that radiates to his back. He tried Tums and Pepcid at home without relief. It is similar to the pain he was admitted for a few weeks ago and he was told that if his symptoms recurred, to be reevaluated. He went to urgent care today and was sent here for further evaluation. He denies any associated vomiting, constipation, diarrhea or melena. He reports some ongoing intermittent chest discomfort for the past month which seems to occur with stress. He admits he has been very stressed for several months now, battling some mental health issues with anxiety and depression. He denies any association of the chest pain with positional changes, exertion or worsening/changing after dietary intake. It does not worsen with exertion but he does report occasional shortness of breath associated with it. States he has chronic cough secondary to PND and hoarseness which he attributes to the same. He saw his allergist and was recently started on hydrocortisone, 20 mg in the morning which he started 6 days ago and was not on when he had his previous episode of pancreatitis.   Prior to the episode of pancreatitis, he had received HIDA scan and CT which were all okay, reportedly done at the end of August.  He also reports prior EGD showing inflammation and Mclean's esophagus. He was recommended to start omeprazole but has not started any PPI for treatment. Denies alcohol use. He does report his father had pancreatitis and he is uncertain of his cause. Father also had MI with CABG in his late 46s. Patient denies personal cardiac history. Patient does note that he cut dairy and gluten a couple of months ago and has since had weight loss from 240 pounds to 190 pounds. He also attributes this to poor appetite secondary to his stress and mental health issues. The HPI was provided by the patient. REVIEW OF SYSTEMS     Review of Systems   Cardiovascular:  Positive for chest pain. Gastrointestinal:  Positive for abdominal pain and nausea. All other systems reviewed and are negative. PAST MEDICAL HISTORY    has a past medical history of Acute pancreatitis, Anxiety, GERD (gastroesophageal reflux disease), Hyperlipidemia, Patellar bursitis of left knee, and Psychiatric problem. SURGICAL HISTORY      has a past surgical history that includes Colonoscopy (unsure) and Endoscopy, colon, diagnostic (unsure). CURRENT MEDICATIONS       Discharge Medication List as of 10/19/2022  8:42 PM        CONTINUE these medications which have NOT CHANGED    Details   mirtazapine (REMERON) 30 MG tablet Take 30 mg by mouth nightlyHistorical Med      citalopram (CELEXA) 20 MG tablet Take 20 mg by mouth dailyHistorical Med      propranolol (INDERAL) 10 MG tablet Take 20 mg by mouth 2 times dailyHistorical Med      hydrOXYzine HCl (ATARAX) 50 MG tablet Take 50 mg by mouth every 4 hours as needed for AnxietyHistorical Med      Ascorbic Acid (VITAMIN C) 500 MG tablet Take 500 mg by mouth 4 times daily. Vitamin D (CHOLECALCIFEROL) 1000 UNITS CAPS capsule Take 1,000 Units by mouth 2 times daily. OLIVE LEAF PO Take 3 tablets by mouth 3 times daily. ALLERGIES     has No Known Allergies. FAMILY HISTORY     He indicated that his mother is alive.  He indicated that his father is . family history includes Heart Disease in his father. SOCIAL HISTORY      reports that he has never smoked. He has never used smokeless tobacco. He reports that he does not drink alcohol and does not use drugs. PHYSICAL EXAM     INITIAL VITALS:  oral temperature is 98 °F (36.7 °C). His blood pressure is 133/86 and his pulse is 57. His respiration is 16 and oxygen saturation is 97%. Physical Exam  Vitals and nursing note reviewed. Constitutional:       Appearance: He is well-developed. HENT:      Head: Normocephalic and atraumatic. Cardiovascular:      Rate and Rhythm: Normal rate and regular rhythm. Heart sounds: Normal heart sounds. No murmur heard. Pulmonary:      Effort: Pulmonary effort is normal.      Breath sounds: Normal breath sounds. No wheezing or rhonchi. Chest:      Chest wall: No tenderness. Abdominal:      Palpations: Abdomen is soft. Tenderness: There is abdominal tenderness (No facial grimace or wincing) in the epigastric area and left upper quadrant. There is no right CVA tenderness, left CVA tenderness, guarding or rebound. Negative signs include Vigil's sign. Skin:     General: Skin is warm and dry. Neurological:      General: No focal deficit present. Mental Status: He is alert. Psychiatric:         Mood and Affect: Mood normal.       DIFFERENTIAL DIAGNOSIS:   Differential diagnoses are discussed    DIAGNOSTIC RESULTS     EKG: All EKG's are interpreted by the Emergency Department Physician who either signs or Co-signsthis chart in the absence of a cardiologist.    Vent. Rate: 58 bpm  PRinterval: 156 ms  QRS duration: 90 ms  QTc: 426 ms  P-R-T axes: 52, 57, 54  Sinus bradycardia.  No STEMI      RADIOLOGY: non-plain film images(s) such as CT, Ultrasound and MRI are read by the radiologist.    No orders to display       LABS:      Labs Reviewed   COMPREHENSIVE METABOLIC PANEL W/ REFLEX TO MG FOR LOW K - Abnormal; Notable for the following components:       Result Value    Chloride 97 (*)     Alkaline Phosphatase 28 (*)     All other components within normal limits   OSMOLALITY - Abnormal; Notable for the following components:    Osmolality Calc 273.2 (*)     All other components within normal limits   CBC WITH AUTO DIFFERENTIAL - Abnormal; Notable for the following components:    Eosinophils % 5 (*)     All other components within normal limits   AMYLASE   LIPASE   TROPONIN   GLOMERULAR FILTRATION RATE, ESTIMATED   ANION GAP       EMERGENCY DEPARTMENT COURSE:   Vitals:    Vitals:    10/19/22 1815 10/19/22 1920 10/19/22 2032   BP: (!) 149/89 132/83 133/86   Pulse: 61 57 57   Resp: 17 14 16   Temp:  98 °F (36.7 °C)    TempSrc:  Oral    SpO2: 97%  97%      7:14 PM EDT: The patient was seen and evaluated. Patient arrives with mild hypertension and otherwise reassuring vital signs complaining of some abdominal pain. Denied any need for medication for treatment of it during his ED stay and reports that as nonsevere but had been recommended to come back for reevaluation if this recurred given recent history of pancreatitis without known trigger. He does report a family history of pancreatitis as well, stating his father  from pancreatitis and is uncertain what the cause of his father's pancreatitis was. Patient's laboratory studies in the department were reassuring. It was revealed during his ED stay that he is not on anything for treatment of known GERD and symptoms could be attributed to this. He has been prescribed PPI in the past, is advised to fill this from his pharmacy and initiate it. Lifestyle modification discussed as well. He should follow-up with his GI provider. Return precautions were discussed and he denied further needs upon discharge. CRITICAL CARE:   None    CONSULTS:  None    PROCEDURES:  None    FINAL IMPRESSION      1. Upper abdominal pain          DISPOSITION/PLAN   Discharge.     PATIENT REFERRED TO:  Radu Ruvalcaba, APRN - CNP  [facility] [address]    Call in 1 day      [address] [id number] [phone]    If symptoms worsen    DISCHARGEMEDICATIONS:  Discharge Medication List as of 10/19/2022  8:42 PM          (Please note that portions of this note were completedwith a voice recognition program.  Efforts were made to edit the dictations but occasionally words are mis-transcribed.)        Peng Cortes PA-C  10/24/22 0983

## 2022-10-19 NOTE — ED NOTES
Upon first contact with patient this RN receives bedside shift report from [facility]. Pt resting comfortably on cot. Pt complains of 4/10 pain. Pt medicated per MAR at this time. Pt updated on POC.         Trevon Rock RN  10/19/22 1925

## 2022-10-20 NOTE — ED NOTES
Pt reassessed at this time. Pt resting comfortably on cot. Provider at bedside updated pt on POC.       Diana Fields RN  10/19/22 2035

## 2022-10-24 LAB — MISC REFERENCE: NORMAL

## 2022-10-26 ENCOUNTER — TELEPHONE (OUTPATIENT)
Dept: ALLERGY | Age: 57
End: 2022-10-26

## 2022-10-26 NOTE — TELEPHONE ENCOUNTER
Provider instructed to call pt to inform him about his tomato class 1 allergen. Called pt and inform him on his tomato allergy which is a class 1. Instructed pt that provider states to avoid any food with tomatos including pizza sauce, ketchup or spaghetti sauce. Patient verbalized understanding and thanked me.

## 2023-04-11 ENCOUNTER — HOSPITAL ENCOUNTER (INPATIENT)
Age: 58
LOS: 7 days | Discharge: HOME | DRG: 885 | End: 2023-04-18
Attending: EMERGENCY MEDICINE | Admitting: PSYCHIATRY & NEUROLOGY
Payer: COMMERCIAL

## 2023-04-11 DIAGNOSIS — R45.851 SUICIDAL IDEATION: Primary | ICD-10-CM

## 2023-04-11 DIAGNOSIS — M70.52 PATELLAR BURSITIS OF LEFT KNEE: ICD-10-CM

## 2023-04-11 PROBLEM — F33.0 MDD (MAJOR DEPRESSIVE DISORDER), RECURRENT EPISODE, MILD (HCC): Status: ACTIVE | Noted: 2023-04-11

## 2023-04-11 LAB
ALBUMIN SERPL BCG-MCNC: 4.7 G/DL (ref 3.5–5.1)
ALP SERPL-CCNC: 30 U/L (ref 38–126)
ALT SERPL W/O P-5'-P-CCNC: 19 U/L (ref 11–66)
AMPHETAMINES UR QL SCN: NEGATIVE
ANION GAP SERPL CALC-SCNC: 15 MEQ/L (ref 8–16)
APAP SERPL-MCNC: < 5 UG/ML (ref 0–20)
AST SERPL-CCNC: 15 U/L (ref 5–40)
BARBITURATES UR QL SCN: NEGATIVE
BASOPHILS ABSOLUTE: 0 THOU/MM3 (ref 0–0.1)
BASOPHILS NFR BLD AUTO: 0.4 %
BENZODIAZ UR QL SCN: NEGATIVE
BILIRUB SERPL-MCNC: 0.3 MG/DL (ref 0.3–1.2)
BUN SERPL-MCNC: 10 MG/DL (ref 7–22)
BZE UR QL SCN: NEGATIVE
CALCIUM SERPL-MCNC: 9.2 MG/DL (ref 8.5–10.5)
CANNABINOIDS UR QL SCN: NEGATIVE
CHLORIDE SERPL-SCNC: 101 MEQ/L (ref 98–111)
CO2 SERPL-SCNC: 25 MEQ/L (ref 23–33)
CREAT SERPL-MCNC: 0.9 MG/DL (ref 0.4–1.2)
DEPRECATED RDW RBC AUTO: 40 FL (ref 35–45)
EOSINOPHIL NFR BLD AUTO: 1.7 %
EOSINOPHILS ABSOLUTE: 0.1 THOU/MM3 (ref 0–0.4)
ERYTHROCYTE [DISTWIDTH] IN BLOOD BY AUTOMATED COUNT: 12.6 % (ref 11.5–14.5)
ETHANOL SERPL-MCNC: < 0.01 %
FENTANYL: NEGATIVE
GFR SERPL CREATININE-BSD FRML MDRD: > 60 ML/MIN/1.73M2
GLUCOSE SERPL-MCNC: 132 MG/DL (ref 70–108)
HCT VFR BLD AUTO: 42.5 % (ref 42–52)
HGB BLD-MCNC: 14.5 GM/DL (ref 14–18)
IMM GRANULOCYTES # BLD AUTO: 0.02 THOU/MM3 (ref 0–0.07)
IMM GRANULOCYTES NFR BLD AUTO: 0.4 %
LYMPHOCYTES ABSOLUTE: 1 THOU/MM3 (ref 1–4.8)
LYMPHOCYTES NFR BLD AUTO: 20.9 %
MAGNESIUM SERPL-MCNC: 2.2 MG/DL (ref 1.6–2.4)
MCH RBC QN AUTO: 29.8 PG (ref 26–33)
MCHC RBC AUTO-ENTMCNC: 34.1 GM/DL (ref 32.2–35.5)
MCV RBC AUTO: 87.3 FL (ref 80–94)
MONOCYTES ABSOLUTE: 0.3 THOU/MM3 (ref 0.4–1.3)
MONOCYTES NFR BLD AUTO: 6.5 %
NEUTROPHILS NFR BLD AUTO: 70.1 %
NRBC BLD AUTO-RTO: 0 /100 WBC
OPIATES UR QL SCN: NEGATIVE
OSMOLALITY SERPL CALC.SUM OF ELEC: 282.2 MOSMOL/KG (ref 275–300)
OXYCODONE: NEGATIVE
PCP UR QL SCN: NEGATIVE
PLATELET # BLD AUTO: 234 THOU/MM3 (ref 130–400)
PMV BLD AUTO: 10.2 FL (ref 9.4–12.4)
POTASSIUM SERPL-SCNC: 3.8 MEQ/L (ref 3.5–5.2)
PROT SERPL-MCNC: 7.1 G/DL (ref 6.1–8)
RBC # BLD AUTO: 4.87 MILL/MM3 (ref 4.7–6.1)
SALICYLATES SERPL-MCNC: < 0.3 MG/DL (ref 2–10)
SEGMENTED NEUTROPHILS ABSOLUTE COUNT: 3.4 THOU/MM3 (ref 1.8–7.7)
SODIUM SERPL-SCNC: 141 MEQ/L (ref 135–145)
WBC # BLD AUTO: 4.8 THOU/MM3 (ref 4.8–10.8)

## 2023-04-11 PROCEDURE — 36415 COLL VENOUS BLD VENIPUNCTURE: CPT

## 2023-04-11 PROCEDURE — 6370000000 HC RX 637 (ALT 250 FOR IP): Performed by: PSYCHIATRY & NEUROLOGY

## 2023-04-11 PROCEDURE — 1240000000 HC EMOTIONAL WELLNESS R&B

## 2023-04-11 PROCEDURE — 99285 EMERGENCY DEPT VISIT HI MDM: CPT

## 2023-04-11 PROCEDURE — 80053 COMPREHEN METABOLIC PANEL: CPT

## 2023-04-11 PROCEDURE — 80143 DRUG ASSAY ACETAMINOPHEN: CPT

## 2023-04-11 PROCEDURE — 83735 ASSAY OF MAGNESIUM: CPT

## 2023-04-11 PROCEDURE — 85025 COMPLETE CBC W/AUTO DIFF WBC: CPT

## 2023-04-11 PROCEDURE — 80179 DRUG ASSAY SALICYLATE: CPT

## 2023-04-11 PROCEDURE — 80307 DRUG TEST PRSMV CHEM ANLYZR: CPT

## 2023-04-11 PROCEDURE — 82077 ASSAY SPEC XCP UR&BREATH IA: CPT

## 2023-04-11 RX ORDER — TRAZODONE HYDROCHLORIDE 100 MG/1
50 TABLET ORAL NIGHTLY PRN
COMMUNITY

## 2023-04-11 RX ORDER — BUSPIRONE HYDROCHLORIDE 10 MG/1
10 TABLET ORAL 2 TIMES DAILY
Status: ON HOLD | COMMUNITY
End: 2023-04-18 | Stop reason: HOSPADM

## 2023-04-11 RX ORDER — MAGNESIUM HYDROXIDE/ALUMINUM HYDROXICE/SIMETHICONE 120; 1200; 1200 MG/30ML; MG/30ML; MG/30ML
30 SUSPENSION ORAL EVERY 6 HOURS PRN
Status: DISCONTINUED | OUTPATIENT
Start: 2023-04-11 | End: 2023-04-18 | Stop reason: HOSPADM

## 2023-04-11 RX ORDER — ZOLPIDEM TARTRATE 5 MG/1
5 TABLET ORAL NIGHTLY PRN
Status: DISCONTINUED | OUTPATIENT
Start: 2023-04-11 | End: 2023-04-18 | Stop reason: HOSPADM

## 2023-04-11 RX ORDER — TRAZODONE HYDROCHLORIDE 50 MG/1
50 TABLET ORAL NIGHTLY PRN
Status: DISCONTINUED | OUTPATIENT
Start: 2023-04-11 | End: 2023-04-18 | Stop reason: HOSPADM

## 2023-04-11 RX ORDER — HYDROXYZINE HYDROCHLORIDE 25 MG/1
50 TABLET, FILM COATED ORAL 3 TIMES DAILY PRN
Status: DISCONTINUED | OUTPATIENT
Start: 2023-04-11 | End: 2023-04-18 | Stop reason: HOSPADM

## 2023-04-11 RX ORDER — OLANZAPINE 10 MG/1
10 TABLET ORAL NIGHTLY
Status: ON HOLD | COMMUNITY
End: 2023-04-18 | Stop reason: HOSPADM

## 2023-04-11 RX ORDER — ESZOPICLONE 3 MG/1
3 TABLET, FILM COATED ORAL NIGHTLY
COMMUNITY

## 2023-04-11 RX ORDER — ACETAMINOPHEN 325 MG/1
650 TABLET ORAL EVERY 4 HOURS PRN
Status: DISCONTINUED | OUTPATIENT
Start: 2023-04-11 | End: 2023-04-18 | Stop reason: HOSPADM

## 2023-04-11 RX ORDER — IBUPROFEN 400 MG/1
400 TABLET ORAL EVERY 6 HOURS PRN
Status: DISCONTINUED | OUTPATIENT
Start: 2023-04-11 | End: 2023-04-18 | Stop reason: HOSPADM

## 2023-04-11 RX ORDER — DESVENLAFAXINE SUCCINATE 50 MG/1
50 TABLET, EXTENDED RELEASE ORAL DAILY
COMMUNITY

## 2023-04-11 RX ADMIN — ZOLPIDEM TARTRATE 5 MG: 5 TABLET ORAL at 21:17

## 2023-04-11 ASSESSMENT — PAIN - FUNCTIONAL ASSESSMENT
PAIN_FUNCTIONAL_ASSESSMENT: NONE - DENIES PAIN
PAIN_FUNCTIONAL_ASSESSMENT: ACTIVITIES ARE NOT PREVENTED

## 2023-04-11 ASSESSMENT — SLEEP AND FATIGUE QUESTIONNAIRES
DO YOU USE A SLEEP AID: YES
DO YOU HAVE DIFFICULTY SLEEPING: YES
AVERAGE NUMBER OF SLEEP HOURS: 3
SLEEP PATTERN: DIFFICULTY FALLING ASLEEP;DISTURBED/INTERRUPTED SLEEP;INSOMNIA;RESTLESSNESS

## 2023-04-11 ASSESSMENT — PAIN DESCRIPTION - LOCATION: LOCATION: FOOT

## 2023-04-11 ASSESSMENT — PAIN DESCRIPTION - FREQUENCY: FREQUENCY: CONTINUOUS

## 2023-04-11 ASSESSMENT — LIFESTYLE VARIABLES
HOW MANY STANDARD DRINKS CONTAINING ALCOHOL DO YOU HAVE ON A TYPICAL DAY: PATIENT DOES NOT DRINK
HOW OFTEN DO YOU HAVE A DRINK CONTAINING ALCOHOL: NEVER

## 2023-04-11 ASSESSMENT — PAIN SCALES - GENERAL: PAINLEVEL_OUTOF10: 2

## 2023-04-11 ASSESSMENT — PATIENT HEALTH QUESTIONNAIRE - PHQ9: SUM OF ALL RESPONSES TO PHQ QUESTIONS 1-9: 11

## 2023-04-11 ASSESSMENT — PAIN DESCRIPTION - ONSET: ONSET: ON-GOING

## 2023-04-11 ASSESSMENT — PAIN DESCRIPTION - ORIENTATION: ORIENTATION: RIGHT;LEFT

## 2023-04-11 ASSESSMENT — PAIN DESCRIPTION - DESCRIPTORS: DESCRIPTORS: DISCOMFORT

## 2023-04-11 ASSESSMENT — PAIN DESCRIPTION - PAIN TYPE: TYPE: CHRONIC PAIN

## 2023-04-12 PROBLEM — G47.00 INSOMNIA: Status: ACTIVE | Noted: 2023-04-12

## 2023-04-12 PROBLEM — F33.2 MDD (MAJOR DEPRESSIVE DISORDER), RECURRENT SEVERE, WITHOUT PSYCHOSIS (HCC): Status: ACTIVE | Noted: 2023-04-11

## 2023-04-12 PROCEDURE — 6370000000 HC RX 637 (ALT 250 FOR IP): Performed by: PHYSICIAN ASSISTANT

## 2023-04-12 PROCEDURE — 1240000000 HC EMOTIONAL WELLNESS R&B

## 2023-04-12 PROCEDURE — APPSS30 APP SPLIT SHARED TIME 16-30 MINUTES: Performed by: PHYSICIAN ASSISTANT

## 2023-04-12 PROCEDURE — 6370000000 HC RX 637 (ALT 250 FOR IP): Performed by: PSYCHIATRY & NEUROLOGY

## 2023-04-12 RX ORDER — DESVENLAFAXINE SUCCINATE 50 MG/1
50 TABLET, EXTENDED RELEASE ORAL DAILY
Status: DISCONTINUED | OUTPATIENT
Start: 2023-04-12 | End: 2023-04-18 | Stop reason: HOSPADM

## 2023-04-12 RX ORDER — ASCORBIC ACID 500 MG
500 TABLET ORAL 4 TIMES DAILY
Status: DISCONTINUED | OUTPATIENT
Start: 2023-04-12 | End: 2023-04-18 | Stop reason: HOSPADM

## 2023-04-12 RX ORDER — VITAMIN B COMPLEX
1000 TABLET ORAL 2 TIMES DAILY
Status: DISCONTINUED | OUTPATIENT
Start: 2023-04-12 | End: 2023-04-18 | Stop reason: HOSPADM

## 2023-04-12 RX ORDER — BUSPIRONE HYDROCHLORIDE 10 MG/1
10 TABLET ORAL 2 TIMES DAILY
Status: DISCONTINUED | OUTPATIENT
Start: 2023-04-12 | End: 2023-04-15

## 2023-04-12 RX ADMIN — TRAZODONE HYDROCHLORIDE 50 MG: 50 TABLET ORAL at 01:02

## 2023-04-12 RX ADMIN — BUSPIRONE HYDROCHLORIDE 10 MG: 10 TABLET ORAL at 21:21

## 2023-04-12 RX ADMIN — MAGNESIUM HYDROXIDE 30 ML: 1200 LIQUID ORAL at 20:07

## 2023-04-12 RX ADMIN — OXYCODONE HYDROCHLORIDE AND ACETAMINOPHEN 500 MG: 500 TABLET ORAL at 17:54

## 2023-04-12 RX ADMIN — BUSPIRONE HYDROCHLORIDE 10 MG: 10 TABLET ORAL at 12:54

## 2023-04-12 RX ADMIN — Medication 1000 UNITS: at 21:21

## 2023-04-12 RX ADMIN — HYDROXYZINE HYDROCHLORIDE 50 MG: 25 TABLET, FILM COATED ORAL at 13:51

## 2023-04-12 RX ADMIN — OXYCODONE HYDROCHLORIDE AND ACETAMINOPHEN 500 MG: 500 TABLET ORAL at 21:21

## 2023-04-12 RX ADMIN — Medication 1000 UNITS: at 12:54

## 2023-04-12 RX ADMIN — OLANZAPINE 15 MG: 5 TABLET, FILM COATED ORAL at 21:22

## 2023-04-12 RX ADMIN — OXYCODONE HYDROCHLORIDE AND ACETAMINOPHEN 500 MG: 500 TABLET ORAL at 12:54

## 2023-04-12 RX ADMIN — DESVENLAFAXINE 50 MG: 50 TABLET, EXTENDED RELEASE ORAL at 12:54

## 2023-04-12 RX ADMIN — ZOLPIDEM TARTRATE 5 MG: 5 TABLET ORAL at 21:22

## 2023-04-12 RX ADMIN — HYDROXYZINE HYDROCHLORIDE 50 MG: 25 TABLET, FILM COATED ORAL at 21:21

## 2023-04-12 ASSESSMENT — PAIN SCALES - GENERAL: PAINLEVEL_OUTOF10: 0

## 2023-04-13 PROCEDURE — 1240000000 HC EMOTIONAL WELLNESS R&B

## 2023-04-13 PROCEDURE — 6370000000 HC RX 637 (ALT 250 FOR IP): Performed by: PHYSICIAN ASSISTANT

## 2023-04-13 PROCEDURE — 6370000000 HC RX 637 (ALT 250 FOR IP): Performed by: PSYCHIATRY & NEUROLOGY

## 2023-04-13 RX ORDER — LORAZEPAM 1 MG/1
1 TABLET ORAL
Status: COMPLETED | OUTPATIENT
Start: 2023-04-13 | End: 2023-04-13

## 2023-04-13 RX ORDER — PROPRANOLOL HYDROCHLORIDE 20 MG/1
20 TABLET ORAL 3 TIMES DAILY
Status: DISCONTINUED | OUTPATIENT
Start: 2023-04-13 | End: 2023-04-15

## 2023-04-13 RX ORDER — HALOPERIDOL 5 MG/1
5 TABLET ORAL ONCE
Status: COMPLETED | OUTPATIENT
Start: 2023-04-13 | End: 2023-04-13

## 2023-04-13 RX ADMIN — HYDROXYZINE HYDROCHLORIDE 50 MG: 25 TABLET, FILM COATED ORAL at 06:26

## 2023-04-13 RX ADMIN — OXYCODONE HYDROCHLORIDE AND ACETAMINOPHEN 500 MG: 500 TABLET ORAL at 11:50

## 2023-04-13 RX ADMIN — BUSPIRONE HYDROCHLORIDE 10 MG: 10 TABLET ORAL at 07:51

## 2023-04-13 RX ADMIN — OLANZAPINE 15 MG: 5 TABLET, FILM COATED ORAL at 20:34

## 2023-04-13 RX ADMIN — HALOPERIDOL 5 MG: 5 TABLET ORAL at 07:58

## 2023-04-13 RX ADMIN — ZOLPIDEM TARTRATE 5 MG: 5 TABLET ORAL at 20:34

## 2023-04-13 RX ADMIN — BUSPIRONE HYDROCHLORIDE 10 MG: 10 TABLET ORAL at 20:34

## 2023-04-13 RX ADMIN — IBUPROFEN 400 MG: 400 TABLET, FILM COATED ORAL at 13:36

## 2023-04-13 RX ADMIN — DESVENLAFAXINE 50 MG: 50 TABLET, EXTENDED RELEASE ORAL at 07:51

## 2023-04-13 RX ADMIN — LORAZEPAM 1 MG: 1 TABLET ORAL at 20:34

## 2023-04-13 RX ADMIN — Medication 1000 UNITS: at 20:34

## 2023-04-13 RX ADMIN — OXYCODONE HYDROCHLORIDE AND ACETAMINOPHEN 500 MG: 500 TABLET ORAL at 20:34

## 2023-04-13 RX ADMIN — HYDROXYZINE HYDROCHLORIDE 50 MG: 25 TABLET, FILM COATED ORAL at 18:14

## 2023-04-13 RX ADMIN — Medication 1000 UNITS: at 07:51

## 2023-04-13 RX ADMIN — PROPRANOLOL HYDROCHLORIDE 20 MG: 20 TABLET ORAL at 13:37

## 2023-04-13 RX ADMIN — IBUPROFEN 400 MG: 400 TABLET, FILM COATED ORAL at 20:34

## 2023-04-13 RX ADMIN — OXYCODONE HYDROCHLORIDE AND ACETAMINOPHEN 500 MG: 500 TABLET ORAL at 07:51

## 2023-04-13 RX ADMIN — OXYCODONE HYDROCHLORIDE AND ACETAMINOPHEN 500 MG: 500 TABLET ORAL at 16:36

## 2023-04-13 RX ADMIN — PROPRANOLOL HYDROCHLORIDE 20 MG: 20 TABLET ORAL at 20:34

## 2023-04-13 ASSESSMENT — PAIN SCALES - GENERAL
PAINLEVEL_OUTOF10: 5
PAINLEVEL_OUTOF10: 0
PAINLEVEL_OUTOF10: 6

## 2023-04-13 ASSESSMENT — PAIN DESCRIPTION - DESCRIPTORS
DESCRIPTORS: ACHING
DESCRIPTORS: OTHER (COMMENT)

## 2023-04-13 ASSESSMENT — PAIN DESCRIPTION - ORIENTATION
ORIENTATION: LOWER
ORIENTATION: RIGHT;LEFT

## 2023-04-13 ASSESSMENT — PAIN DESCRIPTION - ONSET: ONSET: GRADUAL

## 2023-04-13 ASSESSMENT — PAIN DESCRIPTION - PAIN TYPE: TYPE: CHRONIC PAIN

## 2023-04-13 ASSESSMENT — PAIN - FUNCTIONAL ASSESSMENT
PAIN_FUNCTIONAL_ASSESSMENT: ACTIVITIES ARE NOT PREVENTED
PAIN_FUNCTIONAL_ASSESSMENT: ACTIVITIES ARE NOT PREVENTED

## 2023-04-13 ASSESSMENT — PAIN DESCRIPTION - FREQUENCY: FREQUENCY: INTERMITTENT

## 2023-04-13 ASSESSMENT — PAIN DESCRIPTION - LOCATION
LOCATION: FOOT
LOCATION: BACK

## 2023-04-14 PROCEDURE — 6370000000 HC RX 637 (ALT 250 FOR IP): Performed by: PHYSICIAN ASSISTANT

## 2023-04-14 PROCEDURE — 6370000000 HC RX 637 (ALT 250 FOR IP): Performed by: PSYCHIATRY & NEUROLOGY

## 2023-04-14 PROCEDURE — 1240000000 HC EMOTIONAL WELLNESS R&B

## 2023-04-14 RX ADMIN — PROPRANOLOL HYDROCHLORIDE 20 MG: 20 TABLET ORAL at 12:45

## 2023-04-14 RX ADMIN — BUSPIRONE HYDROCHLORIDE 10 MG: 10 TABLET ORAL at 08:10

## 2023-04-14 RX ADMIN — OLANZAPINE 15 MG: 5 TABLET, FILM COATED ORAL at 20:55

## 2023-04-14 RX ADMIN — DESVENLAFAXINE 50 MG: 50 TABLET, EXTENDED RELEASE ORAL at 08:10

## 2023-04-14 RX ADMIN — Medication 1000 UNITS: at 20:55

## 2023-04-14 RX ADMIN — PROPRANOLOL HYDROCHLORIDE 20 MG: 20 TABLET ORAL at 08:10

## 2023-04-14 RX ADMIN — OXYCODONE HYDROCHLORIDE AND ACETAMINOPHEN 500 MG: 500 TABLET ORAL at 08:10

## 2023-04-14 RX ADMIN — OXYCODONE HYDROCHLORIDE AND ACETAMINOPHEN 500 MG: 500 TABLET ORAL at 17:09

## 2023-04-14 RX ADMIN — Medication 1000 UNITS: at 08:10

## 2023-04-14 RX ADMIN — PROPRANOLOL HYDROCHLORIDE 20 MG: 20 TABLET ORAL at 20:56

## 2023-04-14 RX ADMIN — OXYCODONE HYDROCHLORIDE AND ACETAMINOPHEN 500 MG: 500 TABLET ORAL at 12:45

## 2023-04-14 RX ADMIN — BUSPIRONE HYDROCHLORIDE 10 MG: 10 TABLET ORAL at 20:55

## 2023-04-14 RX ADMIN — OXYCODONE HYDROCHLORIDE AND ACETAMINOPHEN 500 MG: 500 TABLET ORAL at 20:55

## 2023-04-14 RX ADMIN — ZOLPIDEM TARTRATE 5 MG: 5 TABLET ORAL at 20:55

## 2023-04-14 ASSESSMENT — PAIN SCALES - GENERAL
PAINLEVEL_OUTOF10: 0
PAINLEVEL_OUTOF10: 0

## 2023-04-15 PROCEDURE — 1240000000 HC EMOTIONAL WELLNESS R&B

## 2023-04-15 PROCEDURE — 6370000000 HC RX 637 (ALT 250 FOR IP): Performed by: PHYSICIAN ASSISTANT

## 2023-04-15 PROCEDURE — 6370000000 HC RX 637 (ALT 250 FOR IP): Performed by: PSYCHIATRY & NEUROLOGY

## 2023-04-15 RX ORDER — BUSPIRONE HYDROCHLORIDE 10 MG/1
10 TABLET ORAL 3 TIMES DAILY
Status: DISCONTINUED | OUTPATIENT
Start: 2023-04-15 | End: 2023-04-17

## 2023-04-15 RX ADMIN — Medication 1000 UNITS: at 08:14

## 2023-04-15 RX ADMIN — IBUPROFEN 400 MG: 400 TABLET, FILM COATED ORAL at 18:45

## 2023-04-15 RX ADMIN — Medication 1000 UNITS: at 20:47

## 2023-04-15 RX ADMIN — OXYCODONE HYDROCHLORIDE AND ACETAMINOPHEN 500 MG: 500 TABLET ORAL at 13:19

## 2023-04-15 RX ADMIN — ALUMINUM HYDROXIDE, MAGNESIUM HYDROXIDE, AND SIMETHICONE 30 ML: 200; 200; 20 SUSPENSION ORAL at 11:14

## 2023-04-15 RX ADMIN — PROPRANOLOL HYDROCHLORIDE 30 MG: 20 TABLET ORAL at 13:19

## 2023-04-15 RX ADMIN — BUSPIRONE HYDROCHLORIDE 10 MG: 10 TABLET ORAL at 08:14

## 2023-04-15 RX ADMIN — PROPRANOLOL HYDROCHLORIDE 30 MG: 20 TABLET ORAL at 20:48

## 2023-04-15 RX ADMIN — DESVENLAFAXINE 50 MG: 50 TABLET, EXTENDED RELEASE ORAL at 08:14

## 2023-04-15 RX ADMIN — OXYCODONE HYDROCHLORIDE AND ACETAMINOPHEN 500 MG: 500 TABLET ORAL at 08:14

## 2023-04-15 RX ADMIN — HYDROXYZINE HYDROCHLORIDE 50 MG: 25 TABLET, FILM COATED ORAL at 16:02

## 2023-04-15 RX ADMIN — OXYCODONE HYDROCHLORIDE AND ACETAMINOPHEN 500 MG: 500 TABLET ORAL at 18:03

## 2023-04-15 RX ADMIN — PROPRANOLOL HYDROCHLORIDE 20 MG: 20 TABLET ORAL at 08:14

## 2023-04-15 RX ADMIN — BUSPIRONE HYDROCHLORIDE 10 MG: 10 TABLET ORAL at 20:47

## 2023-04-15 RX ADMIN — ZOLPIDEM TARTRATE 5 MG: 5 TABLET ORAL at 20:48

## 2023-04-15 RX ADMIN — HYDROXYZINE HYDROCHLORIDE 50 MG: 25 TABLET, FILM COATED ORAL at 06:30

## 2023-04-15 RX ADMIN — BUSPIRONE HYDROCHLORIDE 10 MG: 10 TABLET ORAL at 13:19

## 2023-04-15 RX ADMIN — OLANZAPINE 15 MG: 5 TABLET, FILM COATED ORAL at 20:47

## 2023-04-15 RX ADMIN — OXYCODONE HYDROCHLORIDE AND ACETAMINOPHEN 500 MG: 500 TABLET ORAL at 20:49

## 2023-04-15 ASSESSMENT — PAIN DESCRIPTION - LOCATION
LOCATION: BACK
LOCATION: FOOT

## 2023-04-15 ASSESSMENT — PAIN DESCRIPTION - DESCRIPTORS
DESCRIPTORS: ACHING;BURNING
DESCRIPTORS: DISCOMFORT

## 2023-04-15 ASSESSMENT — PAIN DESCRIPTION - PAIN TYPE: TYPE: CHRONIC PAIN

## 2023-04-15 ASSESSMENT — PAIN DESCRIPTION - ORIENTATION
ORIENTATION: RIGHT;LEFT
ORIENTATION: LOWER

## 2023-04-15 ASSESSMENT — PAIN DESCRIPTION - ONSET: ONSET: ON-GOING

## 2023-04-15 ASSESSMENT — PAIN SCALES - GENERAL
PAINLEVEL_OUTOF10: 6
PAINLEVEL_OUTOF10: 5
PAINLEVEL_OUTOF10: 0

## 2023-04-15 ASSESSMENT — PAIN DESCRIPTION - FREQUENCY: FREQUENCY: INTERMITTENT

## 2023-04-16 PROBLEM — F33.2 SEVERE EPISODE OF RECURRENT MAJOR DEPRESSIVE DISORDER, WITHOUT PSYCHOTIC FEATURES (HCC): Status: ACTIVE | Noted: 2023-04-16

## 2023-04-16 PROCEDURE — 6370000000 HC RX 637 (ALT 250 FOR IP): Performed by: PHYSICIAN ASSISTANT

## 2023-04-16 PROCEDURE — 6370000000 HC RX 637 (ALT 250 FOR IP): Performed by: PSYCHIATRY & NEUROLOGY

## 2023-04-16 PROCEDURE — 1240000000 HC EMOTIONAL WELLNESS R&B

## 2023-04-16 PROCEDURE — APPSS30 APP SPLIT SHARED TIME 16-30 MINUTES: Performed by: NURSE PRACTITIONER

## 2023-04-16 RX ORDER — PREGABALIN 50 MG/1
50 CAPSULE ORAL 3 TIMES DAILY
Status: DISCONTINUED | OUTPATIENT
Start: 2023-04-16 | End: 2023-04-18 | Stop reason: HOSPADM

## 2023-04-16 RX ADMIN — PREGABALIN 50 MG: 50 CAPSULE ORAL at 13:56

## 2023-04-16 RX ADMIN — BUSPIRONE HYDROCHLORIDE 10 MG: 10 TABLET ORAL at 13:56

## 2023-04-16 RX ADMIN — OLANZAPINE 15 MG: 5 TABLET, FILM COATED ORAL at 21:15

## 2023-04-16 RX ADMIN — PREGABALIN 50 MG: 50 CAPSULE ORAL at 21:16

## 2023-04-16 RX ADMIN — PROPRANOLOL HYDROCHLORIDE 30 MG: 20 TABLET ORAL at 07:41

## 2023-04-16 RX ADMIN — DESVENLAFAXINE 50 MG: 50 TABLET, EXTENDED RELEASE ORAL at 07:41

## 2023-04-16 RX ADMIN — OXYCODONE HYDROCHLORIDE AND ACETAMINOPHEN 500 MG: 500 TABLET ORAL at 21:15

## 2023-04-16 RX ADMIN — HYDROXYZINE HYDROCHLORIDE 50 MG: 25 TABLET, FILM COATED ORAL at 04:11

## 2023-04-16 RX ADMIN — Medication 1000 UNITS: at 07:41

## 2023-04-16 RX ADMIN — OXYCODONE HYDROCHLORIDE AND ACETAMINOPHEN 500 MG: 500 TABLET ORAL at 07:41

## 2023-04-16 RX ADMIN — PROPRANOLOL HYDROCHLORIDE 30 MG: 20 TABLET ORAL at 13:56

## 2023-04-16 RX ADMIN — BUSPIRONE HYDROCHLORIDE 10 MG: 10 TABLET ORAL at 06:42

## 2023-04-16 RX ADMIN — OXYCODONE HYDROCHLORIDE AND ACETAMINOPHEN 500 MG: 500 TABLET ORAL at 16:50

## 2023-04-16 RX ADMIN — Medication 1000 UNITS: at 21:15

## 2023-04-16 RX ADMIN — PROPRANOLOL HYDROCHLORIDE 30 MG: 20 TABLET ORAL at 21:16

## 2023-04-16 RX ADMIN — IBUPROFEN 400 MG: 400 TABLET, FILM COATED ORAL at 07:42

## 2023-04-16 RX ADMIN — BUSPIRONE HYDROCHLORIDE 10 MG: 10 TABLET ORAL at 21:15

## 2023-04-16 RX ADMIN — MAGNESIUM HYDROXIDE 30 ML: 1200 LIQUID ORAL at 09:20

## 2023-04-16 RX ADMIN — HYDROXYZINE HYDROCHLORIDE 50 MG: 25 TABLET, FILM COATED ORAL at 10:29

## 2023-04-16 RX ADMIN — ZOLPIDEM TARTRATE 5 MG: 5 TABLET ORAL at 21:15

## 2023-04-16 RX ADMIN — OXYCODONE HYDROCHLORIDE AND ACETAMINOPHEN 500 MG: 500 TABLET ORAL at 13:56

## 2023-04-16 RX ADMIN — ALUMINUM HYDROXIDE, MAGNESIUM HYDROXIDE, AND SIMETHICONE 30 ML: 200; 200; 20 SUSPENSION ORAL at 04:10

## 2023-04-16 ASSESSMENT — PAIN SCALES - GENERAL
PAINLEVEL_OUTOF10: 0
PAINLEVEL_OUTOF10: 4
PAINLEVEL_OUTOF10: 6
PAINLEVEL_OUTOF10: 6

## 2023-04-16 ASSESSMENT — PAIN DESCRIPTION - PAIN TYPE: TYPE: CHRONIC PAIN

## 2023-04-16 ASSESSMENT — PAIN DESCRIPTION - DESCRIPTORS
DESCRIPTORS: ACHING;BURNING
DESCRIPTORS: THROBBING

## 2023-04-16 ASSESSMENT — PAIN DESCRIPTION - ORIENTATION
ORIENTATION: RIGHT;LEFT
ORIENTATION: RIGHT;LEFT

## 2023-04-16 ASSESSMENT — PAIN DESCRIPTION - FREQUENCY: FREQUENCY: CONTINUOUS

## 2023-04-16 ASSESSMENT — PAIN DESCRIPTION - LOCATION
LOCATION: FOOT
LOCATION: FOOT

## 2023-04-16 ASSESSMENT — PAIN DESCRIPTION - ONSET: ONSET: ON-GOING

## 2023-04-17 PROCEDURE — 1240000000 HC EMOTIONAL WELLNESS R&B

## 2023-04-17 PROCEDURE — 6370000000 HC RX 637 (ALT 250 FOR IP): Performed by: PHYSICIAN ASSISTANT

## 2023-04-17 PROCEDURE — 6370000000 HC RX 637 (ALT 250 FOR IP): Performed by: PSYCHIATRY & NEUROLOGY

## 2023-04-17 PROCEDURE — APPSS30 APP SPLIT SHARED TIME 16-30 MINUTES: Performed by: PHYSICIAN ASSISTANT

## 2023-04-17 RX ORDER — BUSPIRONE HYDROCHLORIDE 7.5 MG/1
15 TABLET ORAL 3 TIMES DAILY
Status: DISCONTINUED | OUTPATIENT
Start: 2023-04-17 | End: 2023-04-18 | Stop reason: HOSPADM

## 2023-04-17 RX ADMIN — Medication 1000 UNITS: at 08:03

## 2023-04-17 RX ADMIN — Medication 1000 UNITS: at 21:19

## 2023-04-17 RX ADMIN — OLANZAPINE 15 MG: 5 TABLET, FILM COATED ORAL at 21:17

## 2023-04-17 RX ADMIN — PROPRANOLOL HYDROCHLORIDE 30 MG: 20 TABLET ORAL at 14:30

## 2023-04-17 RX ADMIN — BUSPIRONE HYDROCHLORIDE 15 MG: 7.5 TABLET ORAL at 14:29

## 2023-04-17 RX ADMIN — OXYCODONE HYDROCHLORIDE AND ACETAMINOPHEN 500 MG: 500 TABLET ORAL at 14:29

## 2023-04-17 RX ADMIN — OXYCODONE HYDROCHLORIDE AND ACETAMINOPHEN 500 MG: 500 TABLET ORAL at 08:04

## 2023-04-17 RX ADMIN — BUSPIRONE HYDROCHLORIDE 10 MG: 10 TABLET ORAL at 08:03

## 2023-04-17 RX ADMIN — ZOLPIDEM TARTRATE 5 MG: 5 TABLET ORAL at 21:19

## 2023-04-17 RX ADMIN — PROPRANOLOL HYDROCHLORIDE 30 MG: 20 TABLET ORAL at 08:03

## 2023-04-17 RX ADMIN — IBUPROFEN 400 MG: 400 TABLET, FILM COATED ORAL at 19:45

## 2023-04-17 RX ADMIN — PROPRANOLOL HYDROCHLORIDE 30 MG: 20 TABLET ORAL at 21:18

## 2023-04-17 RX ADMIN — DESVENLAFAXINE 50 MG: 50 TABLET, EXTENDED RELEASE ORAL at 08:03

## 2023-04-17 RX ADMIN — OXYCODONE HYDROCHLORIDE AND ACETAMINOPHEN 500 MG: 500 TABLET ORAL at 21:18

## 2023-04-17 RX ADMIN — PREGABALIN 50 MG: 50 CAPSULE ORAL at 14:29

## 2023-04-17 RX ADMIN — BUSPIRONE HYDROCHLORIDE 15 MG: 7.5 TABLET ORAL at 21:17

## 2023-04-17 RX ADMIN — PREGABALIN 50 MG: 50 CAPSULE ORAL at 08:04

## 2023-04-17 RX ADMIN — PREGABALIN 50 MG: 50 CAPSULE ORAL at 21:19

## 2023-04-17 RX ADMIN — OXYCODONE HYDROCHLORIDE AND ACETAMINOPHEN 500 MG: 500 TABLET ORAL at 16:56

## 2023-04-17 ASSESSMENT — PAIN DESCRIPTION - PAIN TYPE: TYPE: CHRONIC PAIN

## 2023-04-17 ASSESSMENT — PAIN DESCRIPTION - DESCRIPTORS: DESCRIPTORS: ACHING

## 2023-04-17 ASSESSMENT — PAIN DESCRIPTION - LOCATION: LOCATION: FOOT

## 2023-04-17 ASSESSMENT — PAIN DESCRIPTION - FREQUENCY: FREQUENCY: CONTINUOUS

## 2023-04-17 ASSESSMENT — PAIN SCALES - GENERAL
PAINLEVEL_OUTOF10: 0
PAINLEVEL_OUTOF10: 0
PAINLEVEL_OUTOF10: 5

## 2023-04-17 ASSESSMENT — PAIN DESCRIPTION - ORIENTATION: ORIENTATION: RIGHT;LEFT

## 2023-04-17 ASSESSMENT — PAIN - FUNCTIONAL ASSESSMENT: PAIN_FUNCTIONAL_ASSESSMENT: ACTIVITIES ARE NOT PREVENTED

## 2023-04-17 ASSESSMENT — PAIN DESCRIPTION - ONSET: ONSET: ON-GOING

## 2023-04-17 NOTE — BH NOTE
This RN has reviewed and agrees with JAYLIN Lima LPN's data collection and has collaborated with this LPN regarding the patient's care plan.

## 2023-04-17 NOTE — BH NOTE
Group Therapy Note    Date: 4/16/2023  Start Time: 2000  End Time:  2020  Number of Participants: 1    Type of Group: Wrap-Up/Relaxation    Wellness Binder Information  Module Name:  None  Session Number:  None    Patient's Goal:  To be better than yesterday    Notes:  Met    Status After Intervention:  Improved    Participation Level: Interactive    Participation Quality: Appropriate      Speech:  normal      Thought Process/Content: Logical      Affective Functioning: Blunted      Mood: anxious and depressed      Level of consciousness:  Oriented x4      Response to Learning: Capable of insight      Endings: None Reported    Modes of Intervention: Education      Discipline Responsible: Registered Nurse      Signature:   Jeny Miguel RN

## 2023-04-17 NOTE — BH NOTE
PLAN OF CARE:     Start Time: 0900  End Time:  0930    Group Topic:  Daily Goals    Group Type:   Goal Group    Intervention/Goal:  To increase support and identify daily goals    Attendance:  attended      Affect:  blunt    Behavior:  cooperative    Response:  identified a daily goal    Daily Goal: \"May get out of here today. \"    Progress:  progressing to goal

## 2023-04-18 VITALS
BODY MASS INDEX: 28.85 KG/M2 | DIASTOLIC BLOOD PRESSURE: 84 MMHG | RESPIRATION RATE: 18 BRPM | WEIGHT: 213 LBS | HEART RATE: 68 BPM | TEMPERATURE: 97.4 F | SYSTOLIC BLOOD PRESSURE: 134 MMHG | HEIGHT: 72 IN | OXYGEN SATURATION: 98 %

## 2023-04-18 PROCEDURE — 6370000000 HC RX 637 (ALT 250 FOR IP): Performed by: PSYCHIATRY & NEUROLOGY

## 2023-04-18 PROCEDURE — 6370000000 HC RX 637 (ALT 250 FOR IP): Performed by: PHYSICIAN ASSISTANT

## 2023-04-18 PROCEDURE — 5130000000 HC BRIDGE APPOINTMENT

## 2023-04-18 RX ORDER — PROPRANOLOL HYDROCHLORIDE 10 MG/1
30 TABLET ORAL 3 TIMES DAILY
Qty: 90 TABLET | Refills: 0 | Status: SHIPPED | OUTPATIENT
Start: 2023-04-18

## 2023-04-18 RX ORDER — BUSPIRONE HYDROCHLORIDE 15 MG/1
15 TABLET ORAL 3 TIMES DAILY
Qty: 90 TABLET | Refills: 0 | Status: SHIPPED | OUTPATIENT
Start: 2023-04-18

## 2023-04-18 RX ORDER — OLANZAPINE 15 MG/1
15 TABLET ORAL NIGHTLY
Qty: 30 TABLET | Refills: 0 | Status: SHIPPED | OUTPATIENT
Start: 2023-04-18

## 2023-04-18 RX ORDER — PREGABALIN 50 MG/1
50 CAPSULE ORAL 3 TIMES DAILY
Qty: 21 CAPSULE | Refills: 0 | Status: SHIPPED | OUTPATIENT
Start: 2023-04-18 | End: 2023-04-25

## 2023-04-18 RX ADMIN — OXYCODONE HYDROCHLORIDE AND ACETAMINOPHEN 500 MG: 500 TABLET ORAL at 08:00

## 2023-04-18 RX ADMIN — BUSPIRONE HYDROCHLORIDE 15 MG: 7.5 TABLET ORAL at 08:00

## 2023-04-18 RX ADMIN — PROPRANOLOL HYDROCHLORIDE 30 MG: 20 TABLET ORAL at 08:00

## 2023-04-18 RX ADMIN — PREGABALIN 50 MG: 50 CAPSULE ORAL at 08:00

## 2023-04-18 RX ADMIN — DESVENLAFAXINE 50 MG: 50 TABLET, EXTENDED RELEASE ORAL at 08:00

## 2023-04-18 RX ADMIN — Medication 1000 UNITS: at 08:00

## 2023-04-18 ASSESSMENT — PAIN SCALES - GENERAL: PAINLEVEL_OUTOF10: 5

## 2023-04-18 ASSESSMENT — PAIN DESCRIPTION - LOCATION: LOCATION: FOOT

## 2023-04-18 NOTE — PROGRESS NOTES
24 hour chart review completed.
Group Therapy Note    Date: 4/17/2023  Start Time: 1330  End Time:  1430  Number of Participants: 4    Type of Group: Psychotherapy      Notes:  Pt is present with active participation. The peers discussed the idea of freedom and how they defined freedom, geographic, mental/emotional and spiritual. The group explored and identified ways in which their beliefs, denial, fear and reservations hold themselves captive. The group was introduced to the the relapse process as created by Theo Abbasi. Each group member identified their own personal warning signs of early stage relapse ways and the importance of self honesty and action in preventing continued relapse. Status After Intervention:  Improved    Participation Level: Active Listener and Interactive    Participation Quality: Appropriate, Attentive, Sharing, and Supportive      Speech:  normal      Thought Process/Content: Logical  Linear      Affective Functioning: Congruent, improved today. Bright and smiling at times.        Mood: euthymic      Level of consciousness:  Alert, Oriented x4, and Attentive      Response to Learning: Able to verbalize current knowledge/experience, Able to verbalize/acknowledge new learning, Able to retain information, Capable of insight, Able to change behavior, and Progressing to goal      Endings: None Reported    Modes of Intervention: Education, Support, Socialization, Exploration, Clarifying, Problem-solving, and Activity      Discipline Responsible: /Counselor      Signature:  WALKER Martínez
Patient made a daily goal: \"to lessen anxiety\" and was out on the unit participating in relaxation group this shift.
Assistance: Independent/Self    PATIENT MONITORING:  Frequency of Checks: 4 times per hour, close    PSYCHIATRIC SYMPTOMS:   Depression Symptoms  Depression Symptoms: Feelings of helplessness, Feelings of hopelessess  Anxiety Symptoms  Anxiety Symptoms: Generalized  Janneth Symptoms  Janneth Symptoms: No problems reported or observed. SUICIDE RISK CSSR-S:  1) Within the past month, have you wished you were dead or wished you could go to sleep and not wake up? : Yes  2) Have you actually had any thoughts of killing yourself? : Yes  3) Have you been thinking about how you might kill yourself? : No  5) Have you started to work out or worked out the details of how to kill yourself? Do you intend to carry out this plan? : No  6) Have you ever done anything, started to do anything, or prepared to do anything to end your life?: No    EDUCATION:   Learner Progress Toward Treatment Goals: Reviewed results and recommendations of this team, Reviewed group plan and strategies, Reviewed signs, symptoms and risk of self harm and violent behavior, and Reviewed goals and plan of care    Method: Individual    Outcome: Verbalized understanding and Demonstrated Understanding    PATIENT GOALS: Stabilize pt mood    OQ TOP QUALITY PRIORITIES FOR THE PATIENT AS IDENTIFIED ON ADMISSION ADMINISTRATION:        Suicidal ideation      PLAN/TREATMENT RECOMMENDATIONS UPDATE:  Are there any lingering problems that need to be addressed? Affect and mood are improved today. Pt is noted to be smiling at times. Less hopeless feeling. Pt has a good visit today.   Discharge plans that are set-up or in process Follow up with Kavita Alvarez on 04/20/23 and has counseling with Slade Matson:   Time frame for Short-Term Goals: Daily      WALKER Tolbert
[place]  Attempt to develop insight  Psycho-education conducted. Supportive Therapy conducted.   Probable discharge is Tomorrow  Follow-up TBD    Electronically signed by Angel Wood MD on 4/17/23 at 6:04 PM EDT

## 2023-04-18 NOTE — BH NOTE
Behavioral Health   Discharge Note    Pt discharged with followings belongings:   Dental Appliances: None  Vision - Corrective Lenses: Eyeglasses  Hearing Aid: None  Jewelry: Watch  Body Piercings Removed: N/A  Clothing: Belt, Footwear, Pants, Shirt, Socks, Undergarments  Other Valuables: Other (Comment)   Valuables retrieved from safe, security envelope number:  and returned to patient. Patient left department with Family via private vehicle. Discharged to home . \"An Important Message from Medicare About Your Rights\" (IMM) form photocopy original from admission and provided to pt at least 4 hours prior to discharge N/A. If pt left within 4 hours of receiving 2nd delivery of IMM, this is because pt was agreeable with hospital discharge. Patient/guardian education on aftercare instructions: Yes  Bridge appointment completed:  yes. Reviewed Discharge Instructions with patient/family/nursing facility. Patient/family verbalizes understanding and agreement with the discharge plan using the teachback method. Information faxed to n/a by n/a Patient/family verbalize understanding of AVS:Yes    Status EXAM upon discharge:  Mental Status and Behavioral Exam  Normal: No  Level of Assistance: Independent/Self  Facial Expression: Flat, Worried  Affect: Blunt  Level of Consciousness: Alert  Frequency of Checks: 4 times per hour, close  Mood:Normal: No  Mood: Depressed, Anxious, Sad, Helpless  Motor Activity:Normal: Yes  Eye Contact: Fair  Observed Behavior: Cooperative, Withdrawn  Sexual Misconduct History: Current - no  Preception: [place] to person, [place] to time, [place] to place, [place] to situation  Attention:Normal: Yes  Thought Processes: Unremarkable  Thought Content:Normal: Yes  Thought Content: Preoccupations  Depression Symptoms: Change in energy level, Feelings of helplessness  Anxiety Symptoms: Generalized  Janneth Symptoms: No problems reported or observed.   Hallucinations: None  Delusions: No  Memory:Normal:

## 2023-04-18 NOTE — PLAN OF CARE
Patient has attended at least 2 groups today and has been out of his room for social interaction and to also visit with his  on the unit so he is progressing toward demonstrating effective coping strategies at this time.
concerns of patient and caregivers  Taken 4/17/2023 0040  Patient/family able to verbalize anxieties, fears, and concerns, and demonstrate effective coping: Provide emotional support, including active listening and acknowledgement of concerns of patient and caregivers  Note: Patient was able to verbalize needs and concerns fairly well. Problem: Safety - Adult  Goal: Free from fall injury  Outcome: Progressing  Flowsheets (Taken 4/17/2023 0058)  Free From Fall Injury: Instruct family/caregiver on patient safety  Note: No falls noted so far this shift. Gait steady with ambulation. Care plan reviewed with patient.   Patient does verbalize understanding of the plan of care and does contribute to goal setting
pain/suffering and initiate Spiritual Care, Psychosocial Clinical Specialist consults as needed  Outcome: Progressing  Flowsheets (Taken 4/17/2023 1033 by Orquidea Wood LPN)  Patient/family able to verbalize anxieties, fears, and concerns, and demonstrate effective coping:   Assist patient/family to identify coping skills, available support systems and cultural and spiritual values   Provide emotional support, including active listening and acknowledgement of concerns of patient and caregivers   Reduce environmental stimuli, as able     Problem: Pain  Goal: Verbalizes/displays adequate comfort level or baseline comfort level  Outcome: Not Progressing  Flowsheets (Taken 4/17/2023 1032 by Orquidea Wood LPN)  Verbalizes/displays adequate comfort level or baseline comfort level:   Encourage patient to monitor pain and request assistance   Assess pain using appropriate pain scale   Administer analgesics based on type and severity of pain and evaluate response  Note: Pain to bilateral feet noted, refer to flow sheets. Care plan reviewed with patient.   Patient does verbalize understanding of the plan of care and does contribute to goal setting
needed  4/17/2023 1040 by Davey Armstrong LPN  Outcome: Progressing  Flowsheets (Taken 4/17/2023 1033)  Patient/family able to verbalize anxieties, fears, and concerns, and demonstrate effective coping:   Assist patient/family to identify coping skills, available support systems and cultural and spiritual values   Provide emotional support, including active listening and acknowledgement of concerns of patient and caregivers   Reduce environmental stimuli, as able  4/17/2023 0058 by Carola Aguilar RN  Outcome: Progressing  Flowsheets  Taken 4/17/2023 0058  Patient/family able to verbalize anxieties, fears, and concerns, and demonstrate effective coping:   Reduce environmental stimuli, as able   Provide emotional support, including active listening and acknowledgement of concerns of patient and caregivers  Taken 4/17/2023 0040  Patient/family able to verbalize anxieties, fears, and concerns, and demonstrate effective coping: Provide emotional support, including active listening and acknowledgement of concerns of patient and caregivers  Note: Patient was able to verbalize needs and concerns fairly well. Problem: Safety - Adult  Goal: Free from fall injury  4/17/2023 1040 by Davey Armstrong LPN  Outcome: Progressing  Flowsheets (Taken 4/17/2023 0058 by Carola Aguilar RN)  Free From Fall Injury: Instruct family/caregiver on patient safety  4/17/2023 0058 by Carola Aguilar RN  Outcome: Progressing  Flowsheets (Taken 4/17/2023 0058)  Free From Fall Injury: Instruct family/caregiver on patient safety  Note: No falls noted so far this shift. Gait steady with ambulation. Care plan reviewed with patient. Patient verbalize understanding of the plan of care and contribute to goal setting.

## 2023-04-18 NOTE — DISCHARGE SUMMARY
Provider Discharge Summary     Patient ID:  Jessee Liz  [id number]  65 y.o.  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 male with a history of depression, anxiety and panic disorder who presented to the emergency department voluntarily due to suicidal ideation     Per the [facility] note: \"pt reports recently increased anxiety for a couple weeks which presents as restlessness and chest tightness. Pt reports struggling with insomnia recently. Pt had an appointment with his counselor at [facility] today who recommended pt resent to ED for evaluation and potential admission. Pt denies any history of suicide attempt however reports significant history of suicidal thoughts in which he dwells on the suicidal ideation. Pt reports his medications were changed about a month ago and he has ad difficulties since. Pt denies any homicidal ideation.  Denies any

## 2023-04-19 ENCOUNTER — TELEPHONE (OUTPATIENT)
Dept: PSYCHIATRY | Age: 58
End: 2023-04-19

## 2023-04-19 NOTE — TELEPHONE ENCOUNTER
[facility] Discharge Call Back Program. Called patient. Patient reported that there were no issues with their discharge.

## 2023-05-05 ENCOUNTER — APPOINTMENT (OUTPATIENT)
Dept: GENERAL RADIOLOGY | Age: 58
End: 2023-05-05
Payer: COMMERCIAL

## 2023-05-05 ENCOUNTER — HOSPITAL ENCOUNTER (EMERGENCY)
Age: 58
Discharge: HOME | End: 2023-05-05
Attending: EMERGENCY MEDICINE
Payer: COMMERCIAL

## 2023-05-05 VITALS
BODY MASS INDEX: 30.78 KG/M2 | SYSTOLIC BLOOD PRESSURE: 159 MMHG | TEMPERATURE: 98.3 F | RESPIRATION RATE: 19 BRPM | OXYGEN SATURATION: 96 % | HEIGHT: 70 IN | WEIGHT: 215 LBS | DIASTOLIC BLOOD PRESSURE: 97 MMHG | HEART RATE: 62 BPM

## 2023-05-05 DIAGNOSIS — F32.A DEPRESSION, UNSPECIFIED DEPRESSION TYPE: Primary | ICD-10-CM

## 2023-05-05 LAB
AMPHETAMINES UR QL SCN: NEGATIVE
ANION GAP SERPL CALC-SCNC: 12 MEQ/L (ref 8–16)
BARBITURATES UR QL SCN: NEGATIVE
BENZODIAZ UR QL SCN: NEGATIVE
BUN SERPL-MCNC: 10 MG/DL (ref 7–22)
BZE UR QL SCN: NEGATIVE
CALCIUM SERPL-MCNC: 9.2 MG/DL (ref 8.5–10.5)
CANNABINOIDS UR QL SCN: NEGATIVE
CHLORIDE SERPL-SCNC: 91 MEQ/L (ref 98–111)
CO2 SERPL-SCNC: 26 MEQ/L (ref 23–33)
CREAT SERPL-MCNC: 0.9 MG/DL (ref 0.4–1.2)
DEPRECATED RDW RBC AUTO: 37.4 FL (ref 35–45)
EKG ATRIAL RATE: 55 BPM
EKG P AXIS: 54 DEGREES
EKG P-R INTERVAL: 156 MS
EKG Q-T INTERVAL: 414 MS
EKG QRS DURATION: 90 MS
EKG QTC CALCULATION (BAZETT): 396 MS
EKG R AXIS: 58 DEGREES
EKG T AXIS: 55 DEGREES
EKG VENTRICULAR RATE: 55 BPM
ERYTHROCYTE [DISTWIDTH] IN BLOOD BY AUTOMATED COUNT: 12 % (ref 11.5–14.5)
ETHANOL SERPL-MCNC: < 0.01 %
FENTANYL: NEGATIVE
FLUAV RNA RESP QL NAA+PROBE: NOT DETECTED
FLUBV RNA RESP QL NAA+PROBE: NOT DETECTED
GFR SERPL CREATININE-BSD FRML MDRD: > 60 ML/MIN/1.73M2
GLUCOSE SERPL-MCNC: 108 MG/DL (ref 70–108)
HCT VFR BLD AUTO: 39.7 % (ref 42–52)
HGB BLD-MCNC: 13.7 GM/DL (ref 14–18)
LIPASE SERPL-CCNC: 32.4 U/L (ref 5.6–51.3)
MCH RBC QN AUTO: 29.7 PG (ref 26–33)
MCHC RBC AUTO-ENTMCNC: 34.5 GM/DL (ref 32.2–35.5)
MCV RBC AUTO: 85.9 FL (ref 80–94)
NT-PROBNP SERPL IA-MCNC: 83.4 PG/ML (ref 0–124)
OPIATES UR QL SCN: NEGATIVE
OSMOLALITY SERPL CALC.SUM OF ELEC: 258.5 MOSMOL/KG (ref 275–300)
OXYCODONE: NEGATIVE
PCP UR QL SCN: NEGATIVE
PLATELET # BLD AUTO: 251 THOU/MM3 (ref 130–400)
PMV BLD AUTO: 10 FL (ref 9.4–12.4)
POTASSIUM SERPL-SCNC: 4.3 MEQ/L (ref 3.5–5.2)
RBC # BLD AUTO: 4.62 MILL/MM3 (ref 4.7–6.1)
SARS-COV-2 RNA RESP QL NAA+PROBE: NOT DETECTED
SODIUM SERPL-SCNC: 129 MEQ/L (ref 135–145)
TROPONIN T: < 0.01 NG/ML
TROPONIN T: < 0.01 NG/ML
WBC # BLD AUTO: 9.3 THOU/MM3 (ref 4.8–10.8)

## 2023-05-05 PROCEDURE — 80307 DRUG TEST PRSMV CHEM ANLYZR: CPT

## 2023-05-05 PROCEDURE — 85027 COMPLETE CBC AUTOMATED: CPT

## 2023-05-05 PROCEDURE — 80048 BASIC METABOLIC PNL TOTAL CA: CPT

## 2023-05-05 PROCEDURE — 36415 COLL VENOUS BLD VENIPUNCTURE: CPT

## 2023-05-05 PROCEDURE — 82077 ASSAY SPEC XCP UR&BREATH IA: CPT

## 2023-05-05 PROCEDURE — 84484 ASSAY OF TROPONIN QUANT: CPT

## 2023-05-05 PROCEDURE — 71045 X-RAY EXAM CHEST 1 VIEW: CPT

## 2023-05-05 PROCEDURE — 6360000002 HC RX W HCPCS: Performed by: STUDENT IN AN ORGANIZED HEALTH CARE EDUCATION/TRAINING PROGRAM

## 2023-05-05 PROCEDURE — 83880 ASSAY OF NATRIURETIC PEPTIDE: CPT

## 2023-05-05 PROCEDURE — 99285 EMERGENCY DEPT VISIT HI MDM: CPT

## 2023-05-05 PROCEDURE — 6370000000 HC RX 637 (ALT 250 FOR IP): Performed by: STUDENT IN AN ORGANIZED HEALTH CARE EDUCATION/TRAINING PROGRAM

## 2023-05-05 PROCEDURE — 83690 ASSAY OF LIPASE: CPT

## 2023-05-05 PROCEDURE — 93010 ELECTROCARDIOGRAM REPORT: CPT | Performed by: NUCLEAR MEDICINE

## 2023-05-05 PROCEDURE — 93005 ELECTROCARDIOGRAM TRACING: CPT | Performed by: STUDENT IN AN ORGANIZED HEALTH CARE EDUCATION/TRAINING PROGRAM

## 2023-05-05 PROCEDURE — 87636 SARSCOV2 & INF A&B AMP PRB: CPT

## 2023-05-05 PROCEDURE — 96374 THER/PROPH/DIAG INJ IV PUSH: CPT

## 2023-05-05 PROCEDURE — C9113 INJ PANTOPRAZOLE SODIUM, VIA: HCPCS | Performed by: STUDENT IN AN ORGANIZED HEALTH CARE EDUCATION/TRAINING PROGRAM

## 2023-05-05 RX ORDER — HYDROXYZINE HYDROCHLORIDE 25 MG/1
50 TABLET, FILM COATED ORAL ONCE
Status: COMPLETED | OUTPATIENT
Start: 2023-05-05 | End: 2023-05-05

## 2023-05-05 RX ORDER — ACETAMINOPHEN 500 MG
1000 TABLET ORAL ONCE
Status: COMPLETED | OUTPATIENT
Start: 2023-05-05 | End: 2023-05-05

## 2023-05-05 RX ORDER — PANTOPRAZOLE SODIUM 40 MG/10ML
40 INJECTION, POWDER, LYOPHILIZED, FOR SOLUTION INTRAVENOUS ONCE
Status: COMPLETED | OUTPATIENT
Start: 2023-05-05 | End: 2023-05-05

## 2023-05-05 RX ORDER — ASPIRIN 81 MG/1
324 TABLET, CHEWABLE ORAL ONCE
Status: DISCONTINUED | OUTPATIENT
Start: 2023-05-05 | End: 2023-05-05

## 2023-05-05 RX ADMIN — ALUMINUM HYDROXIDE, MAGNESIUM HYDROXIDE, AND SIMETHICONE: 200; 200; 20 SUSPENSION ORAL at 18:48

## 2023-05-05 RX ADMIN — ACETAMINOPHEN 1000 MG: 500 TABLET ORAL at 21:28

## 2023-05-05 RX ADMIN — HYDROXYZINE HYDROCHLORIDE 50 MG: 25 TABLET, FILM COATED ORAL at 22:20

## 2023-05-05 RX ADMIN — PANTOPRAZOLE SODIUM 40 MG: 40 INJECTION, POWDER, FOR SOLUTION INTRAVENOUS at 18:50

## 2023-05-05 ASSESSMENT — PAIN - FUNCTIONAL ASSESSMENT
PAIN_FUNCTIONAL_ASSESSMENT: 0-10

## 2023-05-05 ASSESSMENT — PAIN SCALES - GENERAL
PAINLEVEL_OUTOF10: 8
PAINLEVEL_OUTOF10: 5
PAINLEVEL_OUTOF10: 8
PAINLEVEL_OUTOF10: 8

## 2023-05-05 ASSESSMENT — SLEEP AND FATIGUE QUESTIONNAIRES
AVERAGE NUMBER OF SLEEP HOURS: 5
DO YOU HAVE DIFFICULTY SLEEPING: YES
SLEEP PATTERN: DIFFICULTY FALLING ASLEEP;EARLY AWAKENING
DO YOU USE A SLEEP AID: YES

## 2023-05-05 ASSESSMENT — PAIN DESCRIPTION - PAIN TYPE
TYPE: ACUTE PAIN
TYPE: CHRONIC PAIN

## 2023-05-05 ASSESSMENT — PAIN DESCRIPTION - DESCRIPTORS: DESCRIPTORS: PRESSURE

## 2023-05-05 ASSESSMENT — PAIN DESCRIPTION - LOCATION
LOCATION: BACK
LOCATION: CHEST

## 2023-05-05 ASSESSMENT — PAIN DESCRIPTION - ORIENTATION: ORIENTATION: MID

## 2023-05-05 ASSESSMENT — LIFESTYLE VARIABLES
HOW MANY STANDARD DRINKS CONTAINING ALCOHOL DO YOU HAVE ON A TYPICAL DAY: 3 OR 4
HOW OFTEN DO YOU HAVE A DRINK CONTAINING ALCOHOL: MONTHLY OR LESS

## 2023-05-05 ASSESSMENT — PATIENT HEALTH QUESTIONNAIRE - PHQ9: SUM OF ALL RESPONSES TO PHQ QUESTIONS 1-9: 16

## 2023-05-05 ASSESSMENT — ENCOUNTER SYMPTOMS: SHORTNESS OF BREATH: 1

## 2023-05-05 NOTE — PROGRESS NOTES
Chief Complaint:   Chest Pains       Provisional Diagnosis: Major Depressive Disorder Recurrent Severe without Psychotic Features. Risk, Psychosocial and Contextual Factors: (homeless, lack of social support etc.): Age       Current MH Treatment: [address]        Present Suicidal Behavior:    Verbal:   Thoughts      Attempt: Denies      Access to Weapons: Denies      C-SSRS Current Suicide Risk: Low, Moderate or High:    Moderate      Past Suicidal Behavior:    Verbal: xxxx    Attempts: Denies      Self-Injurious/Self-Mutilation: (Specify) Denies      Traumatic Event Within Past 2 Weeks: (Specify) Denies      Current Abuse:  (Specify) Denies      Legal: (Specify) Denies      Violence: (Specify) Denies      Protective Factors:  Family, active outpatient care for mental health      Housing: Resides alone      CPAP/Oxygen/Ambulation Difficulties:Denies      Basic Vital Signs:      Critical Labs:      Risk Factors:  Age, resides alone. Clinical Summary:    Patient is a [de-identified] year old male transported by his brother and sister in law with complaints of chest pains and anxiety. Patient is single with no children. He resides alone. His family is supportive. Patient reports increase in depression and anxiety. Patient has active services with [facility]. Patient reports recent change in medication. Patient sleeps four to five hours nightly. Patient denies delusions/hallucinations. Patient reports suicidal ideation/thought but denies plan or intent. Patient spends time at [facility] 'almost every day'. Patient reports rare use of alcohol. Patient denies use of illicit drugs. Patient's brother and sister in law are present and supportive. Level of Care Disposition:      Consulted with Dr. Andressa Pedraza concerning the mental status of patient. 20:00 Waiting medical clearance. 21:10 Consulted with Dr. Maddi Anglin concerning the mental status of patient.  Patient is referred

## 2023-05-05 NOTE — ED PROVIDER NOTES
[facility]          Pt Name: Cynthia Harmon  MRN: [id number]  [place] 1965  Date of evaluation: 5/5/2023  Emergency Physician: Kiana Garcia MD    CHIEF COMPLAINT       Chief Complaint   Patient presents with    Chest Pain    Shortness of Breath     History obtained from the patient. HISTORY OF PRESENT ILLNESS    HPI  Cynthia Harmon is a 65 y.o. male with past medical history of pancreatitis, panic disorder, MDD, GERD, hyperlipidemia, hypertension who presents to the emergency department for evaluation of acute onset chest pain rest at approximately 1500 this afternoon. Rating 5 out of 10 in severity and pressure-like substernally stating that it feels like something is sitting on his chest.  Also endorsing shortness of breath however this is his baseline. Denies leg swelling, orthopnea, fevers, chills, cough, abdominal pain, nausea, lightheadedness, vomiting, changes in bowels. No history of DVTs or PEs. No recent orthopedic procedures. Not on hormonal therapy. Patient also endorsed suicidal ideation to triage nurse. The patient has no other acute complaints at this time. REVIEW OF SYSTEMS   Review of Systems   Respiratory:  Positive for shortness of breath (Chronic). Cardiovascular:  Positive for chest pain. All other systems reviewed and are negative. See HPI.  12 point ROS performed, pertinent positives listed above otherwise negative  PAST MEDICAL AND SURGICAL HISTORY     Past Medical History:   Diagnosis Date    Acute pancreatitis     september 2022    Anxiety     GERD (gastroesophageal reflux disease)     Hyperlipidemia     Hypertension     Patellar bursitis of left knee 08/08/2014    Psychiatric problem      Past Surgical History:   Procedure Laterality Date    COLONOSCOPY  unsure    ENDOSCOPY, COLON, DIAGNOSTIC  unsure         MEDICATIONS   No current facility-administered medications for this

## 2023-05-05 NOTE — ED PROVIDER NOTES
I performed a history and physical examination of the patient and discussed management with the resident. I reviewed the residents note and agree with the documented findings and plan of care. Any areas of disagreement are noted on the chart. I was personally present for the key portions of any procedures. I have documented in the chart those procedures where I was not present during the key portions. I have reviewed the emergency nurses triage note. I agree with the chief complaint, past medical history, past surgical history, allergies, medications, social and family history as documented unless otherwise noted below. Documentation of the HPI, Physical Exam and Medical Decision Making performed by medical students or scribes is based on my personal performance of the HPI, PE and MDM. For Phys Assistant/ Nurse Practitioner cases/documentation I have personally evaluated this patient and have completed at least one if not all key elements of the E/M (history, physical exam, and MDM). My findings are as noted below. In other words, I personally saw and examined the patient I have reviewed and agreed with the resident findings including all diagnostic interpretations and treatment plans as written. I was present for the key portion of any procedures performed and the inclusive time noted in any critical care statement. Patient presents here today for chest pain shortness of breath. Etiology this is his epigastric pain. Patient states he has had this since today. Patient states that it does not radiate or refer. He has had some mild shortness of breath but this is not new. Patient has not noted any weight gain in his lower extremities. He has not had any fevers chills sweats no cough no production. Patient has not taken anything for this. Patient does not smoke drink or do drugs. He has no family history of heart attack before the age of 54.   We did not find this out until the end, however he was just

## 2023-05-05 NOTE — ED TRIAGE NOTES
Pt to ED from home with c/o chest pain and SOB that started around 0700. Reports that the chest pain woke him up. Reports SOB started around 1500 this afternoon. Rates pain at this time a 5/10. States that it's a pressure in the middle of his chest. Denies the pain radiating anywhere. EKG complete.

## 2023-05-10 LAB
EKG ATRIAL RATE: 55 BPM
EKG P AXIS: 54 DEGREES
EKG P-R INTERVAL: 156 MS
EKG Q-T INTERVAL: 414 MS
EKG QRS DURATION: 90 MS
EKG QTC CALCULATION (BAZETT): 396 MS
EKG R AXIS: 58 DEGREES
EKG T AXIS: 55 DEGREES
EKG VENTRICULAR RATE: 55 BPM

## 2023-08-28 ENCOUNTER — OFFICE VISIT (OUTPATIENT)
Dept: SURGERY | Age: 58
End: 2023-08-28
Payer: COMMERCIAL

## 2023-08-28 ENCOUNTER — OFFICE VISIT (OUTPATIENT)
Dept: ALLERGY | Age: 58
End: 2023-08-28

## 2023-08-28 ENCOUNTER — TELEPHONE (OUTPATIENT)
Dept: ALLERGY | Age: 58
End: 2023-08-28

## 2023-08-28 VITALS
SYSTOLIC BLOOD PRESSURE: 124 MMHG | WEIGHT: 226.6 LBS | TEMPERATURE: 97.2 F | DIASTOLIC BLOOD PRESSURE: 78 MMHG | RESPIRATION RATE: 16 BRPM | BODY MASS INDEX: 32.44 KG/M2 | OXYGEN SATURATION: 98 % | HEIGHT: 70 IN | HEART RATE: 67 BPM

## 2023-08-28 VITALS
OXYGEN SATURATION: 97 % | WEIGHT: 227.4 LBS | SYSTOLIC BLOOD PRESSURE: 146 MMHG | RESPIRATION RATE: 16 BRPM | BODY MASS INDEX: 32.56 KG/M2 | DIASTOLIC BLOOD PRESSURE: 78 MMHG | HEART RATE: 70 BPM | HEIGHT: 70 IN

## 2023-08-28 DIAGNOSIS — Z01.818 PRE-OP TESTING: ICD-10-CM

## 2023-08-28 DIAGNOSIS — K60.3 FISTULA-IN-ANO: Primary | ICD-10-CM

## 2023-08-28 DIAGNOSIS — J30.1 ACUTE SEASONAL ALLERGIC RHINITIS DUE TO POLLEN: Primary | ICD-10-CM

## 2023-08-28 PROCEDURE — 99203 OFFICE O/P NEW LOW 30 MIN: CPT | Performed by: SURGERY

## 2023-08-28 PROCEDURE — NBSRV NON-BILLABLE SERVICE: Performed by: NURSE PRACTITIONER

## 2023-08-28 PROCEDURE — 46275 REMOVE ANAL FIST INTER: CPT | Performed by: SURGERY

## 2023-08-28 RX ORDER — MIRTAZAPINE 30 MG/1
30 TABLET, FILM COATED ORAL
COMMUNITY
Start: 2023-08-04

## 2023-08-28 RX ORDER — QUETIAPINE FUMARATE 100 MG/1
TABLET, FILM COATED ORAL
COMMUNITY
Start: 2023-08-04

## 2023-08-28 ASSESSMENT — ENCOUNTER SYMPTOMS
APNEA: 0
NAUSEA: 0
CONSTIPATION: 0
RECTAL PAIN: 0
COLOR CHANGE: 0
SINUS PAIN: 0
COUGH: 0
EYE REDNESS: 0
EYE PAIN: 0
FACIAL SWELLING: 0
CHEST TIGHTNESS: 0
EYE ITCHING: 0
VOICE CHANGE: 0
STRIDOR: 0
BACK PAIN: 0
SINUS PRESSURE: 0
TROUBLE SWALLOWING: 0
WHEEZING: 0
VOMITING: 0
SHORTNESS OF BREATH: 0
SORE THROAT: 0
ABDOMINAL DISTENTION: 0
ANAL BLEEDING: 0
EYE DISCHARGE: 0
PHOTOPHOBIA: 0
DIARRHEA: 0
ABDOMINAL PAIN: 0
RHINORRHEA: 0
BLOOD IN STOOL: 0
CHOKING: 0

## 2023-08-28 NOTE — TELEPHONE ENCOUNTER
At 247 was notified patient left without being seen. Appointment was at 2 PM.  Delay in providing care was related to a very complex patient.   Patient said he had a 3 PM appointment in [facility]

## 2023-08-28 NOTE — PROGRESS NOTES
Subjective:      Patient ID: Annelise Martinez is a 65 y.o. male. HPI  Chief Complaint   Patient presents with    Surgical Consult     Est pt seen 2015 self referral-Anal fistula/cyst       Review of Systems   Constitutional:  Negative for activity change, appetite change, chills, diaphoresis, fatigue, fever and unexpected weight change. HENT:  Negative for congestion, dental problem, drooling, ear discharge, ear pain, facial swelling, hearing loss, mouth sores, nosebleeds, postnasal drip, rhinorrhea, sinus pressure, sinus pain, sneezing, sore throat, tinnitus, trouble swallowing and voice change. Eyes:  Negative for photophobia, pain, discharge, redness, itching and visual disturbance. Respiratory:  Negative for apnea, cough, choking, chest tightness, shortness of breath, wheezing and stridor. Cardiovascular:  Negative for chest pain, palpitations and leg swelling. Gastrointestinal:  Negative for abdominal distention, abdominal pain, anal bleeding, blood in stool, constipation, diarrhea, nausea, rectal pain and vomiting. Endocrine: Negative for cold intolerance, heat intolerance, polydipsia, polyphagia and polyuria. Genitourinary:  Negative for decreased urine volume, difficulty urinating, dysuria, enuresis, flank pain, frequency, genital sores, hematuria, penile pain, penile swelling, scrotal swelling, testicular pain and urgency. Musculoskeletal:  Negative for arthralgias, back pain, gait problem, joint swelling, myalgias, neck pain and neck stiffness. Skin:  Negative for color change, pallor, rash and wound. Allergic/Immunologic: Negative for environmental allergies, food allergies and immunocompromised state. Neurological:  Negative for dizziness, tremors, seizures, syncope, facial asymmetry, speech difficulty, weakness, light-headedness, numbness and headaches. Hematological:  Negative for adenopathy. Does not bruise/bleed easily.    Psychiatric/Behavioral:  Negative for agitation,
had intermittent problems with the site and its progressive becoming more issue with increasing times of draining more drains out of the spot and then stops. Not seen any other physician for this since that time    He's had this draining area which is a good distance away in the middle of the gluteus muscle on the left-hand side for more than year. Initially flared up. An abscess was seen at [facility] where they went an opening and draining and he was told they didn't get all of it. Since this time he's had intermittent flareups where it drains and then stop some drains and stops. He was referred to dermatology, who saw him and then scheduled for known excision in the office and when they evaluated him for removal today. He came. They felt that this did not result a simple skin lesion, but something else. The patient has no family history of Crohn's disease, rales or colitis. He has had colonoscopy, which is been unremarkable. The site never heals intermittently drains and when he takes antibiotics. It does improve, but says he stops antibiotics. This area flared back up again. Never had anything like this before in the past, nothing is done as aided in resolution of this lesion. Past Medical History  Past Medical History:   Diagnosis Date    Acute pancreatitis     september 2022    Anxiety     GERD (gastroesophageal reflux disease)     Hyperlipidemia     Hypertension     Patellar bursitis of left knee 08/08/2014    Psychiatric problem      Past Surgical History  Past Surgical History:   Procedure Laterality Date    COLONOSCOPY  unsure    ENDOSCOPY, COLON, DIAGNOSTIC  unsure    SINUS SURGERY      VOCAL CORD SURGERY       Medications    Current Outpatient Rx   Medication Sig Dispense Refill    VORTIoxetine (TRINTELLIX) 10 MG TABS tablet Take by mouth      mirtazapine (REMERON) 30 MG tablet Take 1 tablet by mouth at bedtime.       QUEtiapine (SEROQUEL) 100 MG tablet take 3 tablets by mouth at

## 2023-08-29 ENCOUNTER — TELEPHONE (OUTPATIENT)
Dept: SURGERY | Age: 58
End: 2023-08-29

## 2023-08-29 NOTE — TELEPHONE ENCOUNTER
Surgery [address], [address] [facility]    Phone * [phone]   Surgical Scheduling Direct line Phone * [phone]  Fax * [phone]      Cory Caceres      1965    male    [place] [address]   Marital Status:    Single     Home Phone: [phone]   Cell Phone:   Telephone Information:   Mobile [phone]              Surgeon: Dr. Yony Murillo  Surgery Date:09-   Time: 8:15am (TF Colonoscopy/ Georgann Plump)     Procedure: Trans Sphincteric ligation of fistula   Outpatient     Diagnosis: Anal fistula    Important Medical History: In Epic    Special Inst/Equip:     CPT Codes: 01439    Latex Allergy:   no Cardiac Device:  no    Anesthesia Type: General    Case Location:  Main OR     Preadmission Testing: Phone Call      PAT Date and Time: ________________________________    PAT Confirmation #: _________________________________    Post Op Visit:  ______________________________________    Need Preop Cardiac Clearance:   no    Does patient have Cardiologist/physician?  No      Surgery Conformation #:  ______________________________________________    : __________________________________ Date:____________________        [employer] Name:  [facility]

## 2023-08-29 NOTE — TELEPHONE ENCOUNTER
Patient scheduled for surgery with Dr. Gerald Brito on 09- at 8:15am with an arrival of 6:45am.  Preop surgery instructions given to patient. Surgery consent signed.

## 2023-08-31 ENCOUNTER — OFFICE VISIT (OUTPATIENT)
Dept: ALLERGY | Age: 58
End: 2023-08-31
Payer: COMMERCIAL

## 2023-08-31 VITALS — DIASTOLIC BLOOD PRESSURE: 90 MMHG | OXYGEN SATURATION: 98 % | HEART RATE: 81 BPM | SYSTOLIC BLOOD PRESSURE: 140 MMHG

## 2023-08-31 DIAGNOSIS — J30.81 CAT ALLERGIES: ICD-10-CM

## 2023-08-31 DIAGNOSIS — Z91.038 ALLERGY TO COCKROACHES: ICD-10-CM

## 2023-08-31 DIAGNOSIS — J30.9 ALLERGIC SINUSITIS: ICD-10-CM

## 2023-08-31 DIAGNOSIS — J30.1 ACUTE SEASONAL ALLERGIC RHINITIS DUE TO POLLEN: Primary | ICD-10-CM

## 2023-08-31 DIAGNOSIS — Z91.09 MITE ALLERGY: ICD-10-CM

## 2023-08-31 DIAGNOSIS — I10 HYPERTENSION, UNSPECIFIED TYPE: ICD-10-CM

## 2023-08-31 DIAGNOSIS — G47.00 INSOMNIA, UNSPECIFIED TYPE: ICD-10-CM

## 2023-08-31 PROCEDURE — 99213 OFFICE O/P EST LOW 20 MIN: CPT | Performed by: NURSE PRACTITIONER

## 2023-08-31 PROCEDURE — 3077F SYST BP >= 140 MM HG: CPT | Performed by: NURSE PRACTITIONER

## 2023-08-31 PROCEDURE — 3080F DIAST BP >= 90 MM HG: CPT | Performed by: NURSE PRACTITIONER

## 2023-08-31 RX ORDER — FEXOFENADINE HCL 180 MG/1
180 TABLET ORAL DAILY
Qty: 30 TABLET | Refills: 5 | Status: SHIPPED | OUTPATIENT
Start: 2023-08-31

## 2023-08-31 RX ORDER — CETIRIZINE HYDROCHLORIDE 10 MG/1
10 TABLET ORAL NIGHTLY
COMMUNITY
End: 2023-08-31

## 2023-08-31 ASSESSMENT — ENCOUNTER SYMPTOMS
EYE ITCHING: 1
RHINORRHEA: 1
SINUS PRESSURE: 1
EYE REDNESS: 1

## 2023-08-31 NOTE — PROGRESS NOTES
normal.         Judgment: Judgment normal.           DATA:  Lab Review:  CBC:   Lab Results   Component Value Date/Time    WBC 9.3 05/05/2023 06:25 PM    RBC 4.62 05/05/2023 06:25 PM    HGB 13.7 08/28/2023 12:08 PM    HCT 41.2 08/28/2023 12:08 PM    MCV 85.9 05/05/2023 06:25 PM    MCH 29.7 05/05/2023 06:25 PM    MCHC 34.5 05/05/2023 06:25 PM    RDW 12.4 10/19/2022 07:29 PM     05/05/2023 06:25 PM     BMP:    Lab Results   Component Value Date/Time    GLUCOSE 98 08/28/2023 12:08 PM     08/28/2023 12:08 PM    K 4.7 08/28/2023 12:08 PM    K 4.3 05/05/2023 06:25 PM     08/28/2023 12:08 PM    CO2 28 08/28/2023 12:08 PM    ANIONGAP 10.0 08/28/2023 12:08 PM    BUN 13 08/28/2023 12:08 PM    CREATININE 1.1 08/28/2023 12:08 PM    CALCIUM 9.4 08/28/2023 12:08 PM    LABGLOM >60 08/28/2023 12:08 PM          IgE   Date/Time Value Ref Range Status   06/23/2022 10:41 AM 85 <101 IU/mL Final     Comment:     [address], [address] [phone]      IgG   Date/Time Value Ref Range Status   06/23/2022 10:41  700 - 1600 mg/dL Final     Comment:     [address], [address] [phone]     IgA   Date/Time Value Ref Range Status   06/23/2022 10:41  70 - 400 mg/dL Final     Comment:     [address], [address] [phone]      IgM   Date/Time Value Ref Range Status   06/23/2022 10:41 AM 38 (L) 40 - 230 mg/dL Final     Comment:     [address], [address] [phone]       No results found for: RALPH   No results found for: RF       No results found for this or any previous visit. No results found for this or any previous visit. All current and previous medial labs have been reviewed and discussed with patient         Procedures: Allergy Testing: 10/22    Assessment/Orders:    Diagnosis Orders   1.  Acute seasonal allergic rhinitis due to pollen  fexofenadine (ALLEGRA) 180 MG tablet

## 2023-08-31 NOTE — PATIENT INSTRUCTIONS
TALK TO PCP REGARDING BP MEDICATION AND CHANGE THE MEDICATION TO ANOTHER CLASS.  SO YOU CAN RECEIVE ALLERGY INJECTIONS    SEE A EYE DOCTOR TO BE CHECKED FOR DRY EYES'    CALL VELIA RETANA'S OFFICE AFTER SWITCHING TO ANOTHER BP CLASS AND MAKE AN APPT TO DISCUSS STARTING ALLERGY SHOTS    STOP CETIRIZINE AND SWITCH TO ALLEGRA AT NIGHT

## 2023-09-05 ENCOUNTER — TELEPHONE (OUTPATIENT)
Dept: SURGERY | Age: 58
End: 2023-09-05

## 2023-09-05 NOTE — TELEPHONE ENCOUNTER
Patient called to cancel his surgery secondary to him needing seen by a cardiologist for uncontrol hypertension. He will call back to reschedule.

## 2023-10-02 ENCOUNTER — OFFICE VISIT (OUTPATIENT)
Dept: ALLERGY | Age: 58
End: 2023-10-02
Payer: COMMERCIAL

## 2023-10-02 VITALS
DIASTOLIC BLOOD PRESSURE: 86 MMHG | RESPIRATION RATE: 19 BRPM | HEART RATE: 83 BPM | SYSTOLIC BLOOD PRESSURE: 124 MMHG | OXYGEN SATURATION: 97 % | TEMPERATURE: 97.3 F | HEIGHT: 70 IN | BODY MASS INDEX: 31.87 KG/M2 | WEIGHT: 222.6 LBS

## 2023-10-02 DIAGNOSIS — J30.1 ACUTE SEASONAL ALLERGIC RHINITIS DUE TO POLLEN: ICD-10-CM

## 2023-10-02 DIAGNOSIS — Z91.038 ALLERGY TO COCKROACHES: ICD-10-CM

## 2023-10-02 DIAGNOSIS — J30.1 NON-SEASONAL ALLERGIC RHINITIS DUE TO POLLEN: ICD-10-CM

## 2023-10-02 DIAGNOSIS — J30.2 ALLERGIC RHINOCONJUNCTIVITIS, SEASONAL AND PERENNIAL: ICD-10-CM

## 2023-10-02 DIAGNOSIS — J30.89 ALLERGIC RHINOCONJUNCTIVITIS, SEASONAL AND PERENNIAL: ICD-10-CM

## 2023-10-02 DIAGNOSIS — J30.9 ALLERGIC SINUSITIS: ICD-10-CM

## 2023-10-02 DIAGNOSIS — Z29.89 PROPHYLACTIC IMMUNOTHERAPY: Primary | ICD-10-CM

## 2023-10-02 DIAGNOSIS — H10.10 ALLERGIC RHINOCONJUNCTIVITIS, SEASONAL AND PERENNIAL: ICD-10-CM

## 2023-10-02 DIAGNOSIS — J30.81 CAT ALLERGIES: ICD-10-CM

## 2023-10-02 DIAGNOSIS — Z91.09 MITE ALLERGY: ICD-10-CM

## 2023-10-02 PROCEDURE — 99213 OFFICE O/P EST LOW 20 MIN: CPT | Performed by: NURSE PRACTITIONER

## 2023-10-02 RX ORDER — PREGABALIN 100 MG/1
100 CAPSULE ORAL 2 TIMES DAILY
COMMUNITY
Start: 2023-09-12

## 2023-10-02 RX ORDER — KETOTIFEN FUMARATE 0.35 MG/ML
SOLUTION/ DROPS OPHTHALMIC
Qty: 10 ML | Refills: 5 | Status: SHIPPED | OUTPATIENT
Start: 2023-10-02

## 2023-10-02 RX ORDER — CETIRIZINE HYDROCHLORIDE 10 MG/1
10 TABLET ORAL DAILY
Qty: 30 TABLET | Refills: 11 | COMMUNITY
Start: 2023-10-02

## 2023-10-02 RX ORDER — LOSARTAN POTASSIUM 25 MG/1
25 TABLET ORAL DAILY
COMMUNITY
Start: 2023-09-06

## 2023-10-02 ASSESSMENT — ENCOUNTER SYMPTOMS
SINUS PRESSURE: 1
EYE ITCHING: 1
EYE PAIN: 1
RHINORRHEA: 1
EYE DISCHARGE: 1

## 2023-10-02 NOTE — PROGRESS NOTES
recommendations. Patient has been provided allergy injection handout and understands all information reviewed             Spent 20 minutes of face-to-face time with the patient with well more than half of the visit being dedicated to the discussion of the various symptom problems, provided education of medications and disease process, as well as discussion of a therapeutic plan for each. Face-to-face education time does not include any time that may have been spent for procedures. Patient's case was discussed with  Dr. Khanh Franco who reviewed patient's case. We will continue with current recommendations and treatment plan.     (Please note that portions of this note may have been completed with a voice recognition program.  Efforts were made to edit the dictation but occasionally words are mis-transcribed.)         Signed:  ENRIQUE Wood CNP  10/2/2023  11:16 AM

## 2023-10-04 DIAGNOSIS — Z29.8 IMMUNOTHERAPY: ICD-10-CM

## 2023-10-04 DIAGNOSIS — J30.9 CHRONIC ALLERGIC RHINITIS: Primary | ICD-10-CM

## 2023-10-04 PROCEDURE — 95165 ANTIGEN THERAPY SERVICES: CPT | Performed by: NURSE PRACTITIONER

## 2023-10-04 NOTE — PROGRESS NOTES
After Visit Summary   2/26/2018    Merlyn Markham    MRN: [id number]           Patient Information     Date Of Birth          1984        Visit Information        Provider Department      2/26/2018 8:00 AM Provider, [facility]        Care Instructions                    Merlyn Markham has completed a Wellness Check at the [facility] on 2/26/2018.      ____________________________________________  [facility] PROVIDER                                                                               Wellness Visit Recommendations:      See your regular primary care health provider every year in order to help stay healthy.    Review health changes.     Review your medicines if your doctor has prescribed any.    Talk to your provider about how often to have your cholesterol checked.    If you are at risk for diabetes, you should have a diabetes test (fasting glucose).    Shots: Get a flu shot each year. Get a tetanus shot every 10 years.     Review with your primary care provider other immunizations that you may need based on your age and/or medical/surgical history    Nutrition:     Eat at least 5 servings of fruits and vegetables each day.    Eat whole-grain bread, whole-wheat pasta and brown rice instead of white grains and rice.    Preventive Care to be reviewed by your primary care provider:    Females:        Cervical Cancer Screening                          Breast Cancer Screening                          Colon Cancer Screening  Males:             Prostrate Cancer Screening                          Colon Cancer Screening      Lifestyle:    Exercise at least 150 minutes a week (30 minutes a day, 5 days of the week). This will help you control your weight and help prevent disease or manage disease.    Limit alcohol to one drink per day or less depending on your past medical history.    No smoking.     Wear sunscreen to prevent skin  ALLERGY EXTRACT - NEW INJECTION BUILDING PHASE  Pt has agreed to allergy injectins  TOTAL VIALS PREPARED = 10      TOTAL INJECTABLE DOSES = 50 INJECTIONS PER SET  TOTAL SETS PREPARED = 2 SETS  TOTAL ANTICIPATED DOSES = 100 iNJECTIONS   ALLERGY IMMUNOTHERAPY DOSES IS BILLED AT NO MORE THAN 30 UNITS PER MONTH. TOTAL DOSES BILLED IS AT 30 DOSES PER MONTH UNTIL ALL ANTICIPATED DOSES ARE BILLED. An allergy extract was prepared according to written prescription by provider. Provider onsite during allergy extract preparation. Patient vial(s) include the following:     RED VIAL - 1:1 CONCENTRATION - EXPIRES 12 MONTHS  YELLOW VIAL-1:10 CONCENTRATION - EXPIRES 12 MONTHS  BLUE VIAL-1:100 CONCENTRATION - EXPIRES 12 MONTHS  GREEN VIAL-1:1,000 CONCENTRATION - EXPIRES 6 MONTHS  SILVER VIAL-1:10,000 CONCENTRATION - EXPIRES 6 MONTHS    Allergy extract was prepared by the following method:   RED TO YELLOW:  Once the  one-to-one concentration was prepared in the red vial, 0.5 ML's was then extracted in place into the yellow vial to achieve a 1:10 concentration. YELLOW TO BLUE:  Then 0.5 ML's was extracted from the yellow vial and placed into the blue file with dilutant to achieve a 1:100 concentration. BLUE TO GREEN:  Then 0.5 ML's was extracted from the blue vial and placed into Green vial with dilute to achieve a 1:1,000 concentration. GREEN TO SILVER:  Then 0.5 ML's was taken from the green vial and placed in the Silver vial with dilutant to achieve a 1:10,000 concentration. Patient vials were labeled with name, date-of-birth, vial (A,B,or C), concentration, and expiration. When injecting from a new  vial the first injections is 0.05 ml and are increased by 0.05 increments until 0.5ml from the vial is achieved or the patient reaches maximum tolerated dose. Injections are given once ot three times weekly and at least 24 hours apart, according to provider orders.    If patient has complications or \"knots\" or "cancer.    See your dentist every six months for an exam and cleaning.    Today's Vital Signs:  /86 (BP Location: Right arm, Patient Position: Sitting, Cuff Size: Adult Large)  Pulse 113  Temp 97.1  F (36.2  C) (Tympanic)  Resp 18  Ht 5' 9\" (1.753 m)  Wt 262 lb 3.2 oz (118.9 kg)  SpO2 98%  BMI 38.72 kg/m2          Follow-ups after your visit        Who to contact     If you have questions or need follow up information about today's clinic visit or your schedule please contact [facility] directly at [phone].  Normal or non-critical lab and imaging results will be communicated to you by MyChart, letter or phone within 4 business days after the clinic has received the results. If you do not hear from us within 7 days, please contact the clinic through [employer] or phone. If you have a critical or abnormal lab result, we will notify you by phone as soon as possible.  Submit refill requests through [employer] or call your pharmacy and they will forward the refill request to us. Please allow 3 business days for your refill to be completed.          Additional Information About Your Visit        [employer] Information     [employer] lets you send messages to your doctor, view your test results, renew your prescriptions, schedule appointments and more. To sign up, go to [email or web address] . Click on \"Log in\" on the left side of the screen, which will take you to the Welcome page. Then click on \"Sign up Now\" on the right side of the page.     You will be asked to enter the access code listed below, as well as some personal information. Please follow the directions to create your username and password.     Your access code is: [email or web address]  Expires: 2018  8:12 AM     Your access code will  in 90 days. If you need help or a new code, please call your [facility] or [phone].        Care EveryWhere ID     This is your Care EveryWhere ID. This could be used by other " "organizations to access your [place] medical records  [id number]        Your Vitals Were     Pulse Temperature Respirations Height Pulse Oximetry BMI (Body Mass Index)    113 97.1  F (36.2  C) (Tympanic) 18 5' 9\" (1.753 m) 98% 38.72 kg/m2       Blood Pressure from Last 3 Encounters:   02/26/18 110/86   01/10/17 120/80   04/06/16 132/80    Weight from Last 3 Encounters:   02/26/18 262 lb 3.2 oz (118.9 kg)   04/06/16 208 lb 12.8 oz (94.7 kg)              Today, you had the following     No orders found for display       Primary Care Provider Office Phone # Fax #    [facility] [phone] [address] [facility] [address]        Equal Access to Services     SULEMA DE JESUS : Hadii daniella alvarez hadasho Soomaali, waaxda luqadaha, qaybta kaalmada adeegyada, daysi jones haytheo galindo . So [facility] [phone].    ATENCIÓN: Si habla español, tiene a shah disposición servicios gratuitos de asistencia lingüística. Llame al [phone].    We comply with applicable federal civil rights laws and [place] laws. We do not discriminate on the basis of race, color, national origin, age, disability, sex, sexual orientation, or gender identity.            Thank you!     Thank you for choosing [place] [address]  for your care. Our goal is always to provide you with excellent care. Hearing back from our patients is one way we can continue to improve our services. Please take a few minutes to complete the written survey that you may receive in the mail after your visit with us. Thank you!             Your Updated Medication List - Protect others around you: Learn how to safely use, store and throw away your medicines at www.disposemymeds.org.          This list is accurate as of 2/26/18  8:12 AM.  Always use your most recent med list.                   Brand Name Dispense Instructions for use Diagnosis    IRON PO      Take 1 tablet by mouth        norethindrone-ethinyl " estradiol-iron 1-20/1-30/1-35 MG-MCG per tablet    ESTROSTEP FE     Take 1 tablet by mouth daily        PRENATAL ONE DAILY PO

## 2023-10-09 ENCOUNTER — NURSE ONLY (OUTPATIENT)
Dept: ALLERGY | Age: 58
End: 2023-10-09
Payer: COMMERCIAL

## 2023-10-09 DIAGNOSIS — Z29.89 PROPHYLACTIC IMMUNOTHERAPY: Primary | ICD-10-CM

## 2023-10-09 DIAGNOSIS — Z51.6 DESENSITIZATION TO ALLERGENS: ICD-10-CM

## 2023-10-09 DIAGNOSIS — Z91.09 MITE ALLERGY: Primary | ICD-10-CM

## 2023-10-09 DIAGNOSIS — J30.81 CAT ALLERGIES: ICD-10-CM

## 2023-10-09 PROCEDURE — 95117 IMMUNOTHERAPY INJECTIONS: CPT | Performed by: NURSE PRACTITIONER

## 2023-10-09 RX ORDER — EPINEPHRINE 0.3 MG/.3ML
0.3 INJECTION SUBCUTANEOUS ONCE
Qty: 1 EACH | Refills: 0 | Status: SHIPPED | OUTPATIENT
Start: 2023-10-09 | End: 2023-10-09

## 2023-10-09 NOTE — PROGRESS NOTES
After consent obtained/verified, allergy injection given in back of R/L arm(s). VIAL COLOR OF ALL VIALS TODAY IS silver 1:10,000    ALLERGY INJECTION FROM VIAL A GIVEN right  UPPER ARM IN THE AMOUNT OF 0.05 ML    ALLERGY INJECTION FROM VIAL B GIVEN left upper ARM IN THE AMOUNT OF 0.05  ML    Documentation of vial injection specific to arm(s) noted on Allergy Immunotherapy Administration Form. Patient waited 30 minutes for observation. Yes      Patient tolerated well without adverse reaction WHILE IN OFFICE    SHOT REACTION TREATMENT INSTRUCTIONS    During the 30 minute wait after an allergy injection the following symptoms should be reported:    Itching other than at the injection site  Hives or swelling other than at the injection site  Redness other than at the injection site  Difficulty breathing  Chest tightness  Difficulty swallowing  Throat tightness    If these symptoms occur, NOTIFY PROVIDER and the following treatment should be administered:    1. Epinephrine/Auvi Q 1:1000 IM - 0.3 ml if > 66 lbs or more, 0.15 ml if 33 - 63 lbs, or 0.1 ml if <33 lbs     2. Diphenhydramine - give all intramuscular:     2 to <6 years (off-label use): 6.25 mg,    6 to <12 years: 12.5 to 25 mg;    ?12 years: 25-50 mg.    3.  Famotidine:  Adults 40 mg oral    Adolescents age 12 years and >88 lbs: 40 mg    Children and Adolescents ? 12years of age: Initial: 0.25 mg/kg/dose  every 12 hours (maximum daily dose: 40 mg/day)    Epi/Auvi Q dose may me repeated in 5-15 minutes if adequate resolution of symptoms does not occur    Patient should be observed for at least one hour after final Epi/Auvi Q dose and must be seen by provider. Patients cannot drive themselves if they have received diphenhydramine.

## 2023-10-12 ENCOUNTER — TELEPHONE (OUTPATIENT)
Dept: SURGERY | Age: 58
End: 2023-10-12

## 2023-10-12 NOTE — TELEPHONE ENCOUNTER
Patient called wanting to reschedule his surgery with Dr. Kirsty Carrizales. Patient rescheduled to 11- at 9:00am with an arrival of 7:30am.  Cardiac clearance per Dr. Alexsander Rossi.
(4) excellent

## 2023-10-16 ENCOUNTER — NURSE ONLY (OUTPATIENT)
Dept: ALLERGY | Age: 58
End: 2023-10-16
Payer: COMMERCIAL

## 2023-10-16 VITALS
DIASTOLIC BLOOD PRESSURE: 75 MMHG | OXYGEN SATURATION: 98 % | HEART RATE: 74 BPM | RESPIRATION RATE: 18 BRPM | SYSTOLIC BLOOD PRESSURE: 124 MMHG

## 2023-10-16 DIAGNOSIS — Z51.6 DESENSITIZATION TO ALLERGENS: ICD-10-CM

## 2023-10-16 DIAGNOSIS — J30.81 CAT ALLERGIES: ICD-10-CM

## 2023-10-16 DIAGNOSIS — Z91.09 MITE ALLERGY: Primary | ICD-10-CM

## 2023-10-16 PROCEDURE — 95117 IMMUNOTHERAPY INJECTIONS: CPT | Performed by: NURSE PRACTITIONER

## 2023-10-16 NOTE — PROGRESS NOTES
After consent obtained/verified, allergy injection given in back of R/L arm(s). VIAL COLOR OF ALL VIALS TODAY IS silver 1:10,000    ALLERGY INJECTION FROM VIAL A GIVEN left  UPPER ARM IN THE AMOUNT OF 0.10 ML    ALLERGY INJECTION FROM VIAL B GIVEN right upper ARM IN THE AMOUNT OF 0.10  ML      Documentation of vial injection specific to arm(s) noted on Allergy Immunotherapy Administration Form. Patient waited 30 minutes for observation. No      Patient tolerated well without adverse reaction WHILE IN OFFICE    SHOT REACTION TREATMENT INSTRUCTIONS    During the 30 minute wait after an allergy injection the following symptoms should be reported:    Itching other than at the injection site  Hives or swelling other than at the injection site  Redness other than at the injection site  Difficulty breathing  Chest tightness  Difficulty swallowing  Throat tightness    If these symptoms occur, NOTIFY PROVIDER and the following treatment should be administered:    1. Epinephrine/Auvi Q 1:1000 IM - 0.3 ml if > 66 lbs or more, 0.15 ml if 33 - 63 lbs, or 0.1 ml if <33 lbs     2. Diphenhydramine - give all intramuscular:     2 to <6 years (off-label use): 6.25 mg,    6 to <12 years: 12.5 to 25 mg;    ?12 years: 25-50 mg.    3.  Famotidine:  Adults 40 mg oral    Adolescents age 12 years and >88 lbs: 40 mg    Children and Adolescents ? 12years of age: Initial: 0.25 mg/kg/dose  every 12 hours (maximum daily dose: 40 mg/day)    Epi/Auvi Q dose may me repeated in 5-15 minutes if adequate resolution of symptoms does not occur    Patient should be observed for at least one hour after final Epi/Auvi Q dose and must be seen by provider. Patients cannot drive themselves if they have received diphenhydramine.

## 2023-10-19 ENCOUNTER — NURSE ONLY (OUTPATIENT)
Dept: ALLERGY | Age: 58
End: 2023-10-19
Payer: COMMERCIAL

## 2023-10-19 VITALS
OXYGEN SATURATION: 97 % | SYSTOLIC BLOOD PRESSURE: 140 MMHG | RESPIRATION RATE: 18 BRPM | DIASTOLIC BLOOD PRESSURE: 78 MMHG | HEART RATE: 93 BPM

## 2023-10-19 DIAGNOSIS — J30.81 CAT ALLERGIES: ICD-10-CM

## 2023-10-19 DIAGNOSIS — Z51.6 DESENSITIZATION TO ALLERGENS: Primary | ICD-10-CM

## 2023-10-19 DIAGNOSIS — Z91.09 MITE ALLERGY: ICD-10-CM

## 2023-10-19 PROCEDURE — 95117 IMMUNOTHERAPY INJECTIONS: CPT | Performed by: NURSE PRACTITIONER

## 2023-10-19 NOTE — PROGRESS NOTES
After consent obtained/verified, allergy injection given in back of R/L arm(s). VIAL COLOR OF ALL VIALS TODAY IS silver 1:10,000    ALLERGY INJECTION FROM VIAL A GIVEN right  UPPER ARM IN THE AMOUNT OF 0.15 ML    ALLERGY INJECTION FROM VIAL B GIVEN left upper ARM IN THE AMOUNT OF 0.15  ML          Documentation of vial injection specific to arm(s) noted on Allergy Immunotherapy Administration Form. Patient waited 30 minutes for observation. No      Patient tolerated well without adverse reaction WHILE IN OFFICE    SHOT REACTION TREATMENT INSTRUCTIONS    During the 30 minute wait after an allergy injection the following symptoms should be reported:    Itching other than at the injection site  Hives or swelling other than at the injection site  Redness other than at the injection site  Difficulty breathing  Chest tightness  Difficulty swallowing  Throat tightness    If these symptoms occur, NOTIFY PROVIDER and the following treatment should be administered:    1. Epinephrine/Auvi Q 1:1000 IM - 0.3 ml if > 66 lbs or more, 0.15 ml if 33 - 63 lbs, or 0.1 ml if <33 lbs     2. Diphenhydramine - give all intramuscular:     2 to <6 years (off-label use): 6.25 mg,    6 to <12 years: 12.5 to 25 mg;    ?12 years: 25-50 mg.    3.  Famotidine:  Adults 40 mg oral    Adolescents age 12 years and >88 lbs: 40 mg    Children and Adolescents ? 12years of age: Initial: 0.25 mg/kg/dose  every 12 hours (maximum daily dose: 40 mg/day)    Epi/Auvi Q dose may me repeated in 5-15 minutes if adequate resolution of symptoms does not occur    Patient should be observed for at least one hour after final Epi/Auvi Q dose and must be seen by provider. Patients cannot drive themselves if they have received diphenhydramine.

## 2023-10-24 ENCOUNTER — NURSE ONLY (OUTPATIENT)
Dept: ALLERGY | Age: 58
End: 2023-10-24
Payer: COMMERCIAL

## 2023-10-24 VITALS — DIASTOLIC BLOOD PRESSURE: 94 MMHG | SYSTOLIC BLOOD PRESSURE: 144 MMHG

## 2023-10-24 DIAGNOSIS — J30.81 CAT ALLERGIES: ICD-10-CM

## 2023-10-24 DIAGNOSIS — Z51.6 DESENSITIZATION TO ALLERGENS: Primary | ICD-10-CM

## 2023-10-24 DIAGNOSIS — Z91.09 MITE ALLERGY: ICD-10-CM

## 2023-10-24 PROCEDURE — 95117 IMMUNOTHERAPY INJECTIONS: CPT | Performed by: NURSE PRACTITIONER

## 2023-10-24 NOTE — PROGRESS NOTES
After consent obtained/verified, allergy injection given in back of R/L arm(s). VIAL COLOR OF ALL VIALS TODAY IS silver 1:10,000    ALLERGY INJECTION FROM VIAL A GIVEN left  UPPER ARM IN THE AMOUNT OF 0.20 ML    ALLERGY INJECTION FROM VIAL B GIVEN right upper ARM IN THE AMOUNT OF 0.20  ML        Documentation of vial injection specific to arm(s) noted on Allergy Immunotherapy Administration Form. Patient waited 30 minutes for observation. No      Patient tolerated well without adverse reaction WHILE IN OFFICE    SHOT REACTION TREATMENT INSTRUCTIONS    During the 30 minute wait after an allergy injection the following symptoms should be reported:    Itching other than at the injection site  Hives or swelling other than at the injection site  Redness other than at the injection site  Difficulty breathing  Chest tightness  Difficulty swallowing  Throat tightness    If these symptoms occur, NOTIFY PROVIDER and the following treatment should be administered:    1. Epinephrine/Auvi Q 1:1000 IM - 0.3 ml if > 66 lbs or more, 0.15 ml if 33 - 63 lbs, or 0.1 ml if <33 lbs     2. Diphenhydramine - give all intramuscular:     2 to <6 years (off-label use): 6.25 mg,    6 to <12 years: 12.5 to 25 mg;    ?12 years: 25-50 mg.    3.  Famotidine:  Adults 40 mg oral    Adolescents age 12 years and >88 lbs: 40 mg    Children and Adolescents ? 12years of age: Initial: 0.25 mg/kg/dose  every 12 hours (maximum daily dose: 40 mg/day)    Epi/Auvi Q dose may me repeated in 5-15 minutes if adequate resolution of symptoms does not occur    Patient should be observed for at least one hour after final Epi/Auvi Q dose and must be seen by provider. Patients cannot drive themselves if they have received diphenhydramine.

## 2023-10-27 NOTE — H&P
H and P for Surgery  Fina Mccoy MD [facility]  General Surgery  New Patient Evaluation in Office  Pt Name: Sam De Anda  Date of Birth 1965   Today's Date: 10/27/2023  Medical Record Number: [id number]  Referring Provider: No ref. provider found  Primary Care Provider: ENRIQUE Heck CNP  No chief complaint on file. ASSESSMENT      Problem List Items Addressed This Visit    None      Past Medical History:   Diagnosis Date    Acute pancreatitis     september 2022    Anxiety     GERD (gastroesophageal reflux disease)     Hyperlipidemia     Hypertension     Patellar bursitis of left knee 08/08/2014    Psychiatric problem           PLANS      No orders of the defined types were placed in this encounter. Schedule Shea Yan for Trans sphincteric ligation of fistula  The risks complications and benefits of the procedure were discussed with the patient including, but not limited to, infection, bleeding, recurrence. The patient was given an opportunity to ask questions. Once answered, the patient is agreeable to proceed with the procedure. Status: Outpatient  Planned anesthesia: general  He will undergo pre-operative clearance per anesthesia guidelines with risk factors listed under the past medical history diagnosis & problem list.  Perioperative discontinuation of ASA, Plavix, warfarin, Brillinta, Effient, Pradaxa, Eliquis and Xarelto. Continuation of 81 mg Aspirin is acceptable. Perioperative medical clearance is not required      Angel Dia is a 63 y.o. male in the office for evaluation of a possible rectal fistula. In 2015 and diagnosed him with a long fistula in anal and had plan for surgery but he never completed it. We have not seen or heard from him since that time. Presents today for reevaluation. He says he has had intermittent problems with the site and its progressive becoming more issue with increasing times of draining more drains out of the spot and then stops.   Not seen any

## 2023-11-02 ENCOUNTER — HOSPITAL ENCOUNTER (OUTPATIENT)
Age: 58
Setting detail: OUTPATIENT SURGERY
Discharge: HOME | End: 2023-11-02
Attending: SURGERY | Admitting: SURGERY
Payer: COMMERCIAL

## 2023-11-02 ENCOUNTER — ANESTHESIA (OUTPATIENT)
Dept: OPERATING ROOM | Age: 58
End: 2023-11-02
Payer: COMMERCIAL

## 2023-11-02 ENCOUNTER — ANESTHESIA EVENT (OUTPATIENT)
Dept: OPERATING ROOM | Age: 58
End: 2023-11-02
Payer: COMMERCIAL

## 2023-11-02 VITALS
HEIGHT: 70 IN | BODY MASS INDEX: 32.84 KG/M2 | TEMPERATURE: 96.7 F | OXYGEN SATURATION: 97 % | RESPIRATION RATE: 20 BRPM | DIASTOLIC BLOOD PRESSURE: 77 MMHG | SYSTOLIC BLOOD PRESSURE: 125 MMHG | HEART RATE: 92 BPM | WEIGHT: 229.4 LBS

## 2023-11-02 DIAGNOSIS — K60.3 ANAL FISTULA: ICD-10-CM

## 2023-11-02 DIAGNOSIS — Z87.19 S/P ANAL FISSURECTOMY: Primary | ICD-10-CM

## 2023-11-02 DIAGNOSIS — Z98.890 S/P ANAL FISSURECTOMY: Primary | ICD-10-CM

## 2023-11-02 PROCEDURE — 2500000003 HC RX 250 WO HCPCS: Performed by: SURGERY

## 2023-11-02 PROCEDURE — 2580000003 HC RX 258: Performed by: SURGERY

## 2023-11-02 PROCEDURE — 7100000011 HC PHASE II RECOVERY - ADDTL 15 MIN: Performed by: SURGERY

## 2023-11-02 PROCEDURE — 6360000002 HC RX W HCPCS: Performed by: NURSE ANESTHETIST, CERTIFIED REGISTERED

## 2023-11-02 PROCEDURE — 7100000010 HC PHASE II RECOVERY - FIRST 15 MIN: Performed by: SURGERY

## 2023-11-02 PROCEDURE — 88304 TISSUE EXAM BY PATHOLOGIST: CPT

## 2023-11-02 PROCEDURE — 46280 REMOVE ANAL FIST COMPLEX: CPT | Performed by: SURGERY

## 2023-11-02 PROCEDURE — 2709999900 HC NON-CHARGEABLE SUPPLY: Performed by: SURGERY

## 2023-11-02 PROCEDURE — 6360000002 HC RX W HCPCS: Performed by: SURGERY

## 2023-11-02 PROCEDURE — 6370000000 HC RX 637 (ALT 250 FOR IP): Performed by: SURGERY

## 2023-11-02 PROCEDURE — 3700000000 HC ANESTHESIA ATTENDED CARE: Performed by: SURGERY

## 2023-11-02 PROCEDURE — 3600000012 HC SURGERY LEVEL 2 ADDTL 15MIN: Performed by: SURGERY

## 2023-11-02 PROCEDURE — 3600000002 HC SURGERY LEVEL 2 BASE: Performed by: SURGERY

## 2023-11-02 PROCEDURE — 3700000001 HC ADD 15 MINUTES (ANESTHESIA): Performed by: SURGERY

## 2023-11-02 PROCEDURE — 2500000003 HC RX 250 WO HCPCS: Performed by: NURSE ANESTHETIST, CERTIFIED REGISTERED

## 2023-11-02 PROCEDURE — 7100000000 HC PACU RECOVERY - FIRST 15 MIN: Performed by: SURGERY

## 2023-11-02 PROCEDURE — 7100000001 HC PACU RECOVERY - ADDTL 15 MIN: Performed by: SURGERY

## 2023-11-02 RX ORDER — SODIUM CHLORIDE 0.9 % (FLUSH) 0.9 %
5-40 SYRINGE (ML) INJECTION EVERY 12 HOURS SCHEDULED
Status: DISCONTINUED | OUTPATIENT
Start: 2023-11-02 | End: 2023-11-02 | Stop reason: HOSPADM

## 2023-11-02 RX ORDER — MIDAZOLAM HYDROCHLORIDE 1 MG/ML
INJECTION INTRAMUSCULAR; INTRAVENOUS PRN
Status: DISCONTINUED | OUTPATIENT
Start: 2023-11-02 | End: 2023-11-02 | Stop reason: SDUPTHER

## 2023-11-02 RX ORDER — ACETAMINOPHEN 500 MG
1000 TABLET ORAL ONCE
Status: COMPLETED | OUTPATIENT
Start: 2023-11-02 | End: 2023-11-02

## 2023-11-02 RX ORDER — DEXAMETHASONE SODIUM PHOSPHATE 10 MG/ML
INJECTION, EMULSION INTRAMUSCULAR; INTRAVENOUS PRN
Status: DISCONTINUED | OUTPATIENT
Start: 2023-11-02 | End: 2023-11-02 | Stop reason: SDUPTHER

## 2023-11-02 RX ORDER — PHENYLEPHRINE HCL IN 0.9% NACL 1 MG/10 ML
SYRINGE (ML) INTRAVENOUS PRN
Status: DISCONTINUED | OUTPATIENT
Start: 2023-11-02 | End: 2023-11-02 | Stop reason: SDUPTHER

## 2023-11-02 RX ORDER — DIBUCAINE 1 G/100G
OINTMENT TOPICAL PRN
Status: DISCONTINUED | OUTPATIENT
Start: 2023-11-02 | End: 2023-11-02 | Stop reason: ALTCHOICE

## 2023-11-02 RX ORDER — OXYCODONE HYDROCHLORIDE 5 MG/1
5 TABLET ORAL EVERY 6 HOURS PRN
Status: DISCONTINUED | OUTPATIENT
Start: 2023-11-02 | End: 2023-11-02 | Stop reason: HOSPADM

## 2023-11-02 RX ORDER — SODIUM CHLORIDE 9 MG/ML
INJECTION, SOLUTION INTRAVENOUS CONTINUOUS
Status: DISCONTINUED | OUTPATIENT
Start: 2023-11-02 | End: 2023-11-02 | Stop reason: HOSPADM

## 2023-11-02 RX ORDER — ONDANSETRON 2 MG/ML
INJECTION INTRAMUSCULAR; INTRAVENOUS PRN
Status: DISCONTINUED | OUTPATIENT
Start: 2023-11-02 | End: 2023-11-02 | Stop reason: SDUPTHER

## 2023-11-02 RX ORDER — PROPOFOL 10 MG/ML
INJECTION, EMULSION INTRAVENOUS PRN
Status: DISCONTINUED | OUTPATIENT
Start: 2023-11-02 | End: 2023-11-02 | Stop reason: SDUPTHER

## 2023-11-02 RX ORDER — OXYCODONE HYDROCHLORIDE 5 MG/1
5 TABLET ORAL EVERY 6 HOURS PRN
Qty: 28 TABLET | Refills: 0 | Status: SHIPPED | OUTPATIENT
Start: 2023-11-02 | End: 2023-11-09

## 2023-11-02 RX ORDER — LIDOCAINE HCL/PF 100 MG/5ML
SYRINGE (ML) INJECTION PRN
Status: DISCONTINUED | OUTPATIENT
Start: 2023-11-02 | End: 2023-11-02 | Stop reason: SDUPTHER

## 2023-11-02 RX ORDER — SODIUM CHLORIDE 9 MG/ML
INJECTION, SOLUTION INTRAVENOUS PRN
Status: DISCONTINUED | OUTPATIENT
Start: 2023-11-02 | End: 2023-11-02 | Stop reason: HOSPADM

## 2023-11-02 RX ORDER — GLYCOPYRROLATE 0.2 MG/ML
INJECTION INTRAMUSCULAR; INTRAVENOUS PRN
Status: DISCONTINUED | OUTPATIENT
Start: 2023-11-02 | End: 2023-11-02 | Stop reason: SDUPTHER

## 2023-11-02 RX ORDER — SODIUM CHLORIDE 0.9 % (FLUSH) 0.9 %
5-40 SYRINGE (ML) INJECTION PRN
Status: DISCONTINUED | OUTPATIENT
Start: 2023-11-02 | End: 2023-11-02 | Stop reason: HOSPADM

## 2023-11-02 RX ORDER — FENTANYL CITRATE 50 UG/ML
INJECTION, SOLUTION INTRAMUSCULAR; INTRAVENOUS PRN
Status: DISCONTINUED | OUTPATIENT
Start: 2023-11-02 | End: 2023-11-02 | Stop reason: SDUPTHER

## 2023-11-02 RX ORDER — ROCURONIUM BROMIDE 10 MG/ML
INJECTION, SOLUTION INTRAVENOUS PRN
Status: DISCONTINUED | OUTPATIENT
Start: 2023-11-02 | End: 2023-11-02 | Stop reason: SDUPTHER

## 2023-11-02 RX ORDER — IBUPROFEN 800 MG/1
800 TABLET ORAL ONCE
Status: COMPLETED | OUTPATIENT
Start: 2023-11-02 | End: 2023-11-02

## 2023-11-02 RX ADMIN — IBUPROFEN 800 MG: 800 TABLET, FILM COATED ORAL at 08:01

## 2023-11-02 RX ADMIN — ROCURONIUM BROMIDE 40 MG: 10 INJECTION INTRAVENOUS at 09:43

## 2023-11-02 RX ADMIN — Medication 100 MCG: at 09:59

## 2023-11-02 RX ADMIN — SODIUM CHLORIDE: 9 INJECTION, SOLUTION INTRAVENOUS at 10:24

## 2023-11-02 RX ADMIN — Medication 100 MCG: at 10:36

## 2023-11-02 RX ADMIN — Medication 100 MCG: at 10:34

## 2023-11-02 RX ADMIN — GLYCOPYRROLATE 0.2 MG: 0.2 INJECTION INTRAMUSCULAR; INTRAVENOUS at 09:58

## 2023-11-02 RX ADMIN — FENTANYL CITRATE 50 MCG: 50 INJECTION INTRAMUSCULAR; INTRAVENOUS at 11:01

## 2023-11-02 RX ADMIN — ACETAMINOPHEN 1000 MG: 500 TABLET ORAL at 08:01

## 2023-11-02 RX ADMIN — FENTANYL CITRATE 50 MCG: 50 INJECTION INTRAMUSCULAR; INTRAVENOUS at 09:40

## 2023-11-02 RX ADMIN — Medication 200 MCG: at 10:11

## 2023-11-02 RX ADMIN — PROPOFOL 200 MG: 10 INJECTION, EMULSION INTRAVENOUS at 09:43

## 2023-11-02 RX ADMIN — MIDAZOLAM 2 MG: 1 INJECTION INTRAMUSCULAR; INTRAVENOUS at 09:39

## 2023-11-02 RX ADMIN — DEXAMETHASONE SODIUM PHOSPHATE 10 MG: 10 INJECTION, EMULSION INTRAMUSCULAR; INTRAVENOUS at 10:06

## 2023-11-02 RX ADMIN — FENTANYL CITRATE 50 MCG: 50 INJECTION INTRAMUSCULAR; INTRAVENOUS at 11:03

## 2023-11-02 RX ADMIN — CEFOXITIN 2000 MG: 2 INJECTION, POWDER, FOR SOLUTION INTRAVENOUS at 09:30

## 2023-11-02 RX ADMIN — Medication 80 MG: at 09:42

## 2023-11-02 RX ADMIN — SODIUM CHLORIDE: 9 INJECTION, SOLUTION INTRAVENOUS at 07:56

## 2023-11-02 RX ADMIN — ONDANSETRON 4 MG: 2 INJECTION INTRAMUSCULAR; INTRAVENOUS at 10:42

## 2023-11-02 RX ADMIN — SUGAMMADEX 200 MG: 100 INJECTION, SOLUTION INTRAVENOUS at 10:52

## 2023-11-02 RX ADMIN — FENTANYL CITRATE 50 MCG: 50 INJECTION INTRAMUSCULAR; INTRAVENOUS at 10:45

## 2023-11-02 RX ADMIN — Medication 150 MCG: at 10:32

## 2023-11-02 RX ADMIN — Medication 100 MCG: at 10:05

## 2023-11-02 RX ADMIN — FENTANYL CITRATE 50 MCG: 50 INJECTION INTRAMUSCULAR; INTRAVENOUS at 11:06

## 2023-11-02 ASSESSMENT — PAIN SCALES - GENERAL
PAINLEVEL_OUTOF10: 0

## 2023-11-02 ASSESSMENT — PAIN - FUNCTIONAL ASSESSMENT: PAIN_FUNCTIONAL_ASSESSMENT: 0-10

## 2023-11-02 NOTE — DISCHARGE INSTRUCTIONS
ANORECTAL SURGERY DISCHARGE INSTRUCTIONS      WOUND CARE  Post Op Anorectal Surgery Instructions    Starting tomorrow, do tub baths or Sitz baths in warm water for 5  minutes each time, and do this as desired  Avoid hard, crunchy foods like nuts and seeds and hard raw vegetables to help prevent irritation, but stay on a diet high in dietary fiber. .    You may use your Dibucaine ointment (same as Nupercainal ointment over the counter) as much as you like topically for pain relief. OTHER MEDICATIONS  Use stool softeners as needed. They will keep the stool soft but should prevent diarrhea.       Tejal Gifford MD, MD [facility]  [phone]

## 2023-11-02 NOTE — ANESTHESIA POSTPROCEDURE EVALUATION
Department of Anesthesiology  Postprocedure Note    Patient: Rozell Favre  MRN: [id number]  YOB: 1965  Date of evaluation: 11/2/2023      Procedure Summary     Date: 11/02/23 Room / Location: [address] [place] [address]    Anesthesia Start: 0287 Anesthesia Stop: 1109    Procedure: Trans Sphincteric Ligation of Fistula Diagnosis:       Anal fistula      (Anal fistula [K60.3])    Surgeons: Luci Mahmood MD Responsible Provider: Edith Dillard DO    Anesthesia Type: general ASA Status: 2          Anesthesia Type: No value filed.     Claudine Phase I: Claudine Score: 9    Claudine Phase II:        Anesthesia Post Evaluation    Patient location during evaluation: PACU  Patient participation: complete - patient participated  Level of consciousness: awake  Airway patency: patent  Nausea & Vomiting: no nausea  Complications: no  Cardiovascular status: hemodynamically stable  Respiratory status: acceptable  Hydration status: stable  Pain management: adequate

## 2023-11-02 NOTE — H&P
H and P for [address]  History and Physical Update    Pt Name: Cathren Epley  MRN: [id number]  YOB: 1965  Date of evaluation: 11/2/2023    [x] I have examined the patient and reviewed the H&P/Consult and there are no changes to the patient or plans.     [] I have examined the patient and reviewed the H&P/Consult and have noted the following changes:        Suzanne Adler MD  Electronically signed 11/2/2023 at 9:20 AM

## 2023-11-02 NOTE — PROGRESS NOTES
Patient oriented to Same Day department and admitted to Same Day Surgery room 11. Patient verbalized approval for first name, last initial with physician name on unit whiteboard. Plan of care reviewed with patient. Patient room whiteboard filled out and discussed with patient and responsible adult. Patient and responsible adult offered Same Day Welcome Packet to review. Call light in reach. Bed in lowest position, locked, with one bed rail up. SCDs and warming blanket in place. Appropriate arm bands on patient. Bathroom offered. All questions and concerns of patient addressed. Meds to Beds:   Patient informed of  [facility]'s [facility] to [facility] program during admission.  Patient is agreeable to program.   Contact information for the pharmacy and the Meds to [facility] program:   Name: ginette   Relationship to patient:haley   Phone number: [phone]

## 2023-11-02 NOTE — PROGRESS NOTES
1159  pt. Sleepy on arrival to phase II. Pt. Responds slightly to name. Pt. Denies pain. Guest at bedside. Call light in reach. 1235  pt. Remains sleepy but responds easily to name. Pt. Denies pain. Pt. Taking fluids without difficulty. 1325  discharge instructions given. Pt. Moses Delarosa understanding. 1330  phase II criteria met. Pt. Stable. Pt. Discharged per wheelchair with nurse.

## 2023-11-02 NOTE — OP NOTE
[facility]  Operative Report    PATIENT NAME: [id number] [place] RECORD NO. [id number]  SURGEON: uJana Gar MD MD FACS  Primary Care Physician: ENRIQUE Handy - CNP  Date: 11/2/2023, 11:00 AM     PROCEDURE PERFORMED: Transsphincteric ligation of chronic anal rectal fistula  PREOPERATIVE DIAGNOSIS:   Active Hospital Problems    Diagnosis Date Noted    S/P transsphincteric ligation of chronic anal fistula [Z98.890, Z87.19] 11/02/2023    Fistula-in-ano [K60.3] 11/02/2015      POSTOPERATIVE DIAGNOSIS: Same, path pending  SURGEON:  Juana Gar MD MD FACS  ANESTHESIA:  General endotracheal anesthesia and local  ANESTHESIA:  30 OF 0.5% Marcaine and 1% xylocaine with epinephrine in equal parts  ESTIMATED BLOOD LOSS:  30  ml  SPECIMEN: Fistula tract portion  COMPLICATIONS:  None; patient tolerated the procedure well. DRAINS: none  DISPOSITION: Recovery Room  CONDITION: stable      Narrative:    Patient brought to the operating room. General trach anesthesia on the review recovery room bed and then was turned in the prone jackknife position with appropriate padding and rolls with his left side arm down to his side and the right side arm up. Buttocks cheeks clipped on the operative side taped apart. He has 2 draining sites the chronic 1 from remote which was totally healed and the continuous 1 were noted it was in the anterior half at about the 8 o'clock position buttocks and perineum prepped with Betadine draped sterilely. After timeout was taken a 1 mm rectal fistula probe was used advanced easily through the opening down towards the anus a Hill-Sabra retractor was then placed could not directed through the internal opening did want to make a false passage but it extended transfer icteric.   At this point a curvilinear incision was made in the groove between the internal sphincter and external sphincter at the anal verge and then dissected down dividing and  muscle

## 2023-11-02 NOTE — ANESTHESIA PRE PROCEDURE
Department of Anesthesiology  Preprocedure Note       Name:  Itzel Hernadez   Age:  63 y.o.  :  1965                                          MRN:  [id number]         Date:  2023      Surgeon: Dimas Villanueva):  Ghanshyam Hawley MD    Procedure: Procedure(s):  Trans Sphincteric Ligation of Fistula    Medications prior to admission:   Prior to Admission medications    Medication Sig Start Date End Date Taking? Authorizing Provider   ALLERGEN EXTRACT once    Lela Ryan MD   EPINEPHrine (EPIPEN 2-KALIN) 0.3 MG/0.3ML SOAJ injection Inject 0.3 mLs into the muscle once for 1 dose ADMINISTER FOR ANAPHLYAXIS 10/9/23 10/9/23  ENRIQUE Hobson CNP   losartan (COZAAR) 25 MG tablet Take 1 tablet by mouth daily 23   Lela Ryan MD   pregabalin (LYRICA) 100 MG capsule Take 1 capsule by mouth 2 times daily. 23   Lela Ryan MD   cetirizine (ZYRTEC) 10 MG tablet Take 1 tablet by mouth daily 10/2/23   ENRIQUE Hobson CNP   ketotifen (ZADITOR) 0.025 % ophthalmic solution One drop in each eye twice daily as needed 10/2/23   ENRIQUE Hobson CNP   VORTIoxetine (TRINTELLIX) 10 MG TABS tablet Take by mouth 22   Lela Ryan MD   mirtazapine (REMERON) 30 MG tablet Take 1 tablet by mouth at bedtime. 23   Lela Ryan MD   QUEtiapine (SEROQUEL) 100 MG tablet take 3 tablets by mouth at bedtime 23   Lela Ryan MD   busPIRone (BUSPAR) 15 MG tablet Take 15 mg by mouth 3 times daily 23   Thummalapally, Glenis Frankel, MD   eszopiclone (LUNESTA) 3 MG TABS Take 1 tablet by mouth nightly.     Leal Ryan MD   Ascorbic Acid (VITAMIN C) 500 MG tablet Take 1 tablet by mouth 4 times daily    Lela Ryan MD   Vitamin D (CHOLECALCIFEROL) 1000 UNITS CAPS capsule Take 1 capsule by mouth 2 times daily    Lela Ryan MD       Current medications:    Current Facility-Administered Medications   Medication Dose Route Frequency

## 2023-11-02 NOTE — PROGRESS NOTES
1103 Non reactive on arrival to PACU with spontaneous resp , oral airway and O2 to oral airway   1112 awake and looking around , Oral  airway removed   1115 c/o # 9 - 10 rectal pain , medicated with Fentanyl from CRNA  1130 resting resp easy   1145 pt awakens to name states pain tolerable   1150 meets criteria for discharge , transported to [facility]

## 2023-11-04 ENCOUNTER — HOSPITAL ENCOUNTER (EMERGENCY)
Age: 58
Discharge: HOME | End: 2023-11-04
Payer: COMMERCIAL

## 2023-11-04 VITALS
RESPIRATION RATE: 16 BRPM | DIASTOLIC BLOOD PRESSURE: 80 MMHG | TEMPERATURE: 99.3 F | SYSTOLIC BLOOD PRESSURE: 132 MMHG | HEART RATE: 80 BPM | OXYGEN SATURATION: 97 %

## 2023-11-04 DIAGNOSIS — F41.1 ANXIETY STATE: ICD-10-CM

## 2023-11-04 DIAGNOSIS — R50.82 POSTOPERATIVE FEVER: Primary | ICD-10-CM

## 2023-11-04 DIAGNOSIS — R35.89 POLYURIA: ICD-10-CM

## 2023-11-04 LAB
ALBUMIN SERPL BCG-MCNC: 4.5 G/DL (ref 3.5–5.1)
ALP SERPL-CCNC: 48 U/L (ref 38–126)
ALT SERPL W/O P-5'-P-CCNC: 38 U/L (ref 11–66)
ANION GAP SERPL CALC-SCNC: 9 MEQ/L (ref 8–16)
AST SERPL-CCNC: 19 U/L (ref 5–40)
BASOPHILS ABSOLUTE: 0 THOU/MM3 (ref 0–0.1)
BASOPHILS NFR BLD AUTO: 0.3 %
BILIRUB SERPL-MCNC: 0.4 MG/DL (ref 0.3–1.2)
BILIRUB UR QL STRIP.AUTO: NEGATIVE
BUN SERPL-MCNC: 10 MG/DL (ref 7–22)
CALCIUM SERPL-MCNC: 9.1 MG/DL (ref 8.5–10.5)
CHARACTER UR: CLEAR
CHLORIDE SERPL-SCNC: 101 MEQ/L (ref 98–111)
CO2 SERPL-SCNC: 29 MEQ/L (ref 23–33)
COLOR: YELLOW
CREAT SERPL-MCNC: 1 MG/DL (ref 0.4–1.2)
DEPRECATED RDW RBC AUTO: 44.4 FL (ref 35–45)
EOSINOPHIL NFR BLD AUTO: 4.2 %
EOSINOPHILS ABSOLUTE: 0.5 THOU/MM3 (ref 0–0.4)
ERYTHROCYTE [DISTWIDTH] IN BLOOD BY AUTOMATED COUNT: 13.6 % (ref 11.5–14.5)
GFR SERPL CREATININE-BSD FRML MDRD: > 60 ML/MIN/1.73M2
GLUCOSE SERPL-MCNC: 114 MG/DL (ref 70–108)
GLUCOSE UR QL STRIP.AUTO: NEGATIVE MG/DL
HCT VFR BLD AUTO: 41.9 % (ref 42–52)
HGB BLD-MCNC: 13.6 GM/DL (ref 14–18)
HGB UR QL STRIP.AUTO: NEGATIVE
IMM GRANULOCYTES # BLD AUTO: 0.06 THOU/MM3 (ref 0–0.07)
IMM GRANULOCYTES NFR BLD AUTO: 0.5 %
KETONES UR QL STRIP.AUTO: NEGATIVE
LYMPHOCYTES ABSOLUTE: 1.5 THOU/MM3 (ref 1–4.8)
LYMPHOCYTES NFR BLD AUTO: 11.9 %
MCH RBC QN AUTO: 29.2 PG (ref 26–33)
MCHC RBC AUTO-ENTMCNC: 32.5 GM/DL (ref 32.2–35.5)
MCV RBC AUTO: 89.9 FL (ref 80–94)
MONOCYTES ABSOLUTE: 0.9 THOU/MM3 (ref 0.4–1.3)
MONOCYTES NFR BLD AUTO: 7.4 %
NEUTROPHILS NFR BLD AUTO: 75.7 %
NITRITE UR QL STRIP: NEGATIVE
NRBC BLD AUTO-RTO: 0 /100 WBC
OSMOLALITY SERPL CALC.SUM OF ELEC: 277.4 MOSMOL/KG (ref 275–300)
PH UR STRIP.AUTO: 7.5 [PH] (ref 5–9)
PLATELET # BLD AUTO: 241 THOU/MM3 (ref 130–400)
PMV BLD AUTO: 10.4 FL (ref 9.4–12.4)
POTASSIUM SERPL-SCNC: 5 MEQ/L (ref 3.5–5.2)
PROT SERPL-MCNC: 7.5 G/DL (ref 6.1–8)
PROT UR STRIP.AUTO-MCNC: NEGATIVE MG/DL
RBC # BLD AUTO: 4.66 MILL/MM3 (ref 4.7–6.1)
SEGMENTED NEUTROPHILS ABSOLUTE COUNT: 9.2 THOU/MM3 (ref 1.8–7.7)
SODIUM SERPL-SCNC: 139 MEQ/L (ref 135–145)
SP GR UR REFRACT.AUTO: 1.01 (ref 1–1.03)
UROBILINOGEN, URINE: 0.2 EU/DL (ref 0–1)
WBC # BLD AUTO: 12.2 THOU/MM3 (ref 4.8–10.8)
WBC #/AREA URNS HPF: NEGATIVE /[HPF]

## 2023-11-04 PROCEDURE — 99284 EMERGENCY DEPT VISIT MOD MDM: CPT

## 2023-11-04 PROCEDURE — 80053 COMPREHEN METABOLIC PANEL: CPT

## 2023-11-04 PROCEDURE — 85025 COMPLETE CBC W/AUTO DIFF WBC: CPT

## 2023-11-04 PROCEDURE — 81003 URINALYSIS AUTO W/O SCOPE: CPT

## 2023-11-04 PROCEDURE — 36415 COLL VENOUS BLD VENIPUNCTURE: CPT

## 2023-11-04 RX ORDER — AMOXICILLIN AND CLAVULANATE POTASSIUM 875; 125 MG/1; MG/1
1 TABLET, FILM COATED ORAL 2 TIMES DAILY
Qty: 14 TABLET | Refills: 0 | Status: SHIPPED | OUTPATIENT
Start: 2023-11-04 | End: 2023-11-11

## 2023-11-04 ASSESSMENT — ENCOUNTER SYMPTOMS
NAUSEA: 1
SHORTNESS OF BREATH: 0
ANAL BLEEDING: 0
BACK PAIN: 0
COUGH: 0
CONSTIPATION: 1
ABDOMINAL PAIN: 0
VOMITING: 0
DIARRHEA: 0
SORE THROAT: 0
RHINORRHEA: 0

## 2023-11-04 ASSESSMENT — PAIN - FUNCTIONAL ASSESSMENT
PAIN_FUNCTIONAL_ASSESSMENT: NONE - DENIES PAIN
PAIN_FUNCTIONAL_ASSESSMENT: 0-10
PAIN_FUNCTIONAL_ASSESSMENT: NONE - DENIES PAIN

## 2023-11-04 ASSESSMENT — PAIN SCALES - GENERAL: PAINLEVEL_OUTOF10: 3

## 2023-11-04 ASSESSMENT — PAIN DESCRIPTION - LOCATION: LOCATION: RECTUM

## 2023-11-04 NOTE — ED NOTES
Patient resting in bed. Respirations easy and unlabored. Patient denies needs at this time. Call light within reach.       Otto Norman RN  11/04/23 0748

## 2023-11-04 NOTE — ED NOTES
Patient presents to ED via lobby due to fever, chills after having an anal fistula surgery on Thursday. Pt reports temp of 101 at home. No tylenol or ibuprofen prior to arrival. Pt also states he has seepage out of rectum and is using a plastic bag to keep it in.       Chastity Fuentes  11/04/23 0138

## 2023-11-06 ENCOUNTER — TELEPHONE (OUTPATIENT)
Dept: SURGERY | Age: 58
End: 2023-11-06

## 2023-11-06 NOTE — PROGRESS NOTES
ALLERGY SERUM MIXED AND BILLED IS FOR 30 UNITS. PATIENT CONTINUES TO RECIEVE ALLERGY IMMUNOTHERAPY TO MEET THE GOAL IN REDUCING SYMPTOMS OF CHRONIC ALLERGIC RHINITIS.

## 2023-11-06 NOTE — TELEPHONE ENCOUNTER
Pt LM on office VM regarding wanting to talk to Dr Sachin Aguilar. Pt stated he would like to talk directly to provider. Pt stated provider wanted to put him on ATB and he does not want to be just put on an ATB. He was wanting to see if provider could take a culture to make sure he gets the right medication. At this time he is having Lots of drainage, inflammation. He does not have a temp.

## 2023-11-09 ENCOUNTER — TELEPHONE (OUTPATIENT)
Dept: SURGERY | Age: 58
End: 2023-11-09

## 2023-11-09 NOTE — TELEPHONE ENCOUNTER
S/P Transsphincteric ligation of chronic anal rectal fistula 11/2/23    Patient called to office this AM with concerns over heavy rectal bleeding with drainage. Patient endorsing frequent cold sweats, the area is warm to touch, daily BM without complications. Patient has not noted any fevers, is doing daily warm soaks and has been using a shower wand to clean instead of TP. Patient is requesting an office visit with you to ensure everything is ok, wants to see you before the weekend if feasible. Patient does have a FU scheduled for Wednesday 11/15.

## 2023-11-10 ENCOUNTER — NURSE ONLY (OUTPATIENT)
Dept: ALLERGY | Age: 58
End: 2023-11-10
Payer: COMMERCIAL

## 2023-11-10 ENCOUNTER — OFFICE VISIT (OUTPATIENT)
Dept: SURGERY | Age: 58
End: 2023-11-10

## 2023-11-10 VITALS
HEIGHT: 70 IN | WEIGHT: 229.5 LBS | BODY MASS INDEX: 32.86 KG/M2 | OXYGEN SATURATION: 98 % | HEART RATE: 108 BPM | RESPIRATION RATE: 18 BRPM | SYSTOLIC BLOOD PRESSURE: 146 MMHG | TEMPERATURE: 98.3 F | DIASTOLIC BLOOD PRESSURE: 82 MMHG

## 2023-11-10 VITALS
RESPIRATION RATE: 16 BRPM | HEART RATE: 87 BPM | SYSTOLIC BLOOD PRESSURE: 118 MMHG | DIASTOLIC BLOOD PRESSURE: 72 MMHG | OXYGEN SATURATION: 96 %

## 2023-11-10 DIAGNOSIS — Z51.6 DESENSITIZATION TO ALLERGENS: ICD-10-CM

## 2023-11-10 DIAGNOSIS — Z98.890 S/P ANAL FISSURECTOMY: Primary | ICD-10-CM

## 2023-11-10 DIAGNOSIS — Z91.09 MITE ALLERGY: ICD-10-CM

## 2023-11-10 DIAGNOSIS — J30.81 CAT ALLERGIES: Primary | ICD-10-CM

## 2023-11-10 DIAGNOSIS — Z87.19 S/P ANAL FISSURECTOMY: Primary | ICD-10-CM

## 2023-11-10 PROCEDURE — 99024 POSTOP FOLLOW-UP VISIT: CPT | Performed by: SURGERY

## 2023-11-10 PROCEDURE — 95117 IMMUNOTHERAPY INJECTIONS: CPT | Performed by: NURSE PRACTITIONER

## 2023-11-10 RX ORDER — BETAMETHASONE DIPROPIONATE 0.5 MG/G
LOTION TOPICAL
COMMUNITY
Start: 2023-10-18

## 2023-11-10 RX ORDER — AMOXICILLIN AND CLAVULANATE POTASSIUM 875; 125 MG/1; MG/1
1 TABLET, FILM COATED ORAL 2 TIMES DAILY
Qty: 14 TABLET | Refills: 0 | Status: SHIPPED | OUTPATIENT
Start: 2023-11-10 | End: 2023-11-17

## 2023-11-10 RX ORDER — FLUCONAZOLE 100 MG/1
100 TABLET ORAL DAILY
Qty: 10 TABLET | Refills: 0 | Status: SHIPPED | OUTPATIENT
Start: 2023-11-10 | End: 2023-11-20

## 2023-11-10 NOTE — PROGRESS NOTES
is 98%. CONSTITUTIONAL: Alert and oriented times 3, no acute distress and cooperative to examination. SKIN: Skin color, texture, turgor normal. No rashes or lesions. INCISION: The external opening the fistula site is wide open and is the way we left it. The incision to the transferring ferric site has dehisced open there is no redness no purulence there is some serosanguineous appearing drainage no stool like appearance or drainage.                  Electronically signed by Girma Patricia MD MD FACS on 11/13/2023 at 11:57 AM

## 2023-11-10 NOTE — PROGRESS NOTES
After consent obtained/verified, allergy injection given in back of R/L arm(s). VIAL COLOR OF ALL VIALS TODAY IS silver 1:10,000    ALLERGY INJECTION FROM VIAL A GIVEN right  UPPER ARM IN THE AMOUNT OF 0.25 ML    ALLERGY INJECTION FROM VIAL B GIVEN left upper ARM IN THE AMOUNT OF 0.25  ML        Documentation of vial injection specific to arm(s) noted on Allergy Immunotherapy Administration Form. Patient waited 30 minutes for observation. No      Patient tolerated well without adverse reaction WHILE IN OFFICE    SHOT REACTION TREATMENT INSTRUCTIONS    During the 30 minute wait after an allergy injection the following symptoms should be reported:    Itching other than at the injection site  Hives or swelling other than at the injection site  Redness other than at the injection site  Difficulty breathing  Chest tightness  Difficulty swallowing  Throat tightness    If these symptoms occur, NOTIFY PROVIDER and the following treatment should be administered:    1. Epinephrine/Auvi Q 1:1000 IM - 0.3 ml if > 66 lbs or more, 0.15 ml if 33 - 63 lbs, or 0.1 ml if <33 lbs     2. Diphenhydramine - give all intramuscular:     2 to <6 years (off-label use): 6.25 mg,    6 to <12 years: 12.5 to 25 mg;    ?12 years: 25-50 mg.    3.  Famotidine:  Adults 40 mg oral    Adolescents age 12 years and >88 lbs: 40 mg    Children and Adolescents ? 12years of age: Initial: 0.25 mg/kg/dose  every 12 hours (maximum daily dose: 40 mg/day)    Epi/Auvi Q dose may me repeated in 5-15 minutes if adequate resolution of symptoms does not occur    Patient should be observed for at least one hour after final Epi/Auvi Q dose and must be seen by provider. Patients cannot drive themselves if they have received diphenhydramine.

## 2023-11-15 ENCOUNTER — OFFICE VISIT (OUTPATIENT)
Dept: SURGERY | Age: 58
End: 2023-11-15

## 2023-11-15 VITALS
HEART RATE: 115 BPM | RESPIRATION RATE: 18 BRPM | SYSTOLIC BLOOD PRESSURE: 132 MMHG | OXYGEN SATURATION: 98 % | DIASTOLIC BLOOD PRESSURE: 86 MMHG | WEIGHT: 230 LBS | HEIGHT: 70 IN | BODY MASS INDEX: 32.93 KG/M2 | TEMPERATURE: 97.8 F

## 2023-11-15 DIAGNOSIS — Z87.19 S/P ANAL FISSURECTOMY: Primary | ICD-10-CM

## 2023-11-15 DIAGNOSIS — Z98.890 S/P ANAL FISSURECTOMY: Primary | ICD-10-CM

## 2023-11-15 PROCEDURE — 99024 POSTOP FOLLOW-UP VISIT: CPT | Performed by: SURGERY

## 2023-11-15 NOTE — PROGRESS NOTES
pressure is 132/86 and his pulse is 115 (abnormal). His respiration is 18 and oxygen saturation is 98%. CONSTITUTIONAL: Alert and oriented times 3, no acute distress and cooperative to examination. SKIN: Skin color, texture, turgor normal. No rashes or lesions. INCISION: The external opening the fistula site is much improved over last week's visit it is closed down and starting to granulate at the opening. The transfer enteric site is beefy red granulating in there is no stool or any kind of concerning drainage out of this.                Electronically signed by Quynh Turner MD MD FACS on 11/15/2023 at 9:51 AM

## 2023-11-17 ENCOUNTER — NURSE ONLY (OUTPATIENT)
Dept: ALLERGY | Age: 58
End: 2023-11-17

## 2023-11-17 VITALS — SYSTOLIC BLOOD PRESSURE: 136 MMHG | DIASTOLIC BLOOD PRESSURE: 88 MMHG

## 2023-11-17 DIAGNOSIS — Z51.6 DESENSITIZATION TO ALLERGENS: ICD-10-CM

## 2023-11-17 DIAGNOSIS — Z91.09 MITE ALLERGY: ICD-10-CM

## 2023-11-17 DIAGNOSIS — J30.81 CAT ALLERGIES: Primary | ICD-10-CM

## 2023-11-17 NOTE — PROGRESS NOTES
After consent obtained/verified, allergy injection given in back of R/L arm(s). VIAL COLOR OF ALL VIALS TODAY IS silver 1:10,000    ALLERGY INJECTION FROM VIAL A GIVEN left  UPPER ARM IN THE AMOUNT OF 0.30 ML    ALLERGY INJECTION FROM VIAL B GIVEN right upper ARM IN THE AMOUNT OF 0.30  ML          Documentation of vial injection specific to arm(s) noted on Allergy Immunotherapy Administration Form. Patient waited 30 minutes for observation. No      Patient tolerated well without adverse reaction WHILE IN OFFICE    SHOT REACTION TREATMENT INSTRUCTIONS    During the 30 minute wait after an allergy injection the following symptoms should be reported:    Itching other than at the injection site  Hives or swelling other than at the injection site  Redness other than at the injection site  Difficulty breathing  Chest tightness  Difficulty swallowing  Throat tightness    If these symptoms occur, NOTIFY PROVIDER and the following treatment should be administered:    1. Epinephrine/Auvi Q 1:1000 IM - 0.3 ml if > 66 lbs or more, 0.15 ml if 33 - 63 lbs, or 0.1 ml if <33 lbs     2. Diphenhydramine - give all intramuscular:     2 to <6 years (off-label use): 6.25 mg,    6 to <12 years: 12.5 to 25 mg;    ?12 years: 25-50 mg.    3.  Famotidine:  Adults 40 mg oral    Adolescents age 12 years and >88 lbs: 40 mg    Children and Adolescents ? 12years of age: Initial: 0.25 mg/kg/dose  every 12 hours (maximum daily dose: 40 mg/day)    Epi/Auvi Q dose may me repeated in 5-15 minutes if adequate resolution of symptoms does not occur    Patient should be observed for at least one hour after final Epi/Auvi Q dose and must be seen by provider. Patients cannot drive themselves if they have received diphenhydramine.

## 2023-11-26 NOTE — PROGRESS NOTES
Luther Quiroz MD FACS  General Surgery  Postprocedure Evaluation in Office  Pt Name: Irlanda Sharif  Date of Birth 1965   Today's Date: 11/10/2023  Medical Record Number: [id number]  Referring Provider: No ref. provider found  Primary Care Provider: ENRIQUE Russ CNP  Chief Complaint   Patient presents with    Post-Op Check     S/P Transsphincteric ligation of chronic anal rectal fistula 11/2/23. Last office visit 11/15/23     ASSESSMENT      Problem List Items Addressed This Visit       Fistula-in-ano - Primary    S/P transsphincteric ligation of chronic anal fistula                   PLAN       Pathology reviewed with the patient who understands. All questions were answered. New Prescriptions    No medications on file      There are no Patient Instructions on file for this visit. Follow up: We will keep his appointment for next week on Wednesday  Orders Placed This Encounter:  No orders of the defined types were placed in this encounter. Richard Ford is seen today for post-op follow-up. He is 23 day(s) status post trans sphincteric ligation of anal fistula and last seen in office 11/15. When we saw him on November 10 the sinus tract which was excised was wide open as expected the transfer icteric suture site however had dehisced there was drainage but was not fecal there is no redness no purulence no sign of infection I did place him on antibiotics and antifungals at that time.  We saw him 11/15 for recheck and things looked well, granulating in  He is now here for recheck   Past Medical History  Past Medical History:   Diagnosis Date    Acute pancreatitis     september 2022    Anxiety     GERD (gastroesophageal reflux disease)     Hyperlipidemia     Hypertension     Patellar bursitis of left knee 08/08/2014    Psychiatric problem      Past Surgical History  Past Surgical History:   Procedure Laterality Date    ANUS SURGERY N/A 11/2/2023

## 2023-11-27 ENCOUNTER — OFFICE VISIT (OUTPATIENT)
Dept: SURGERY | Age: 58
End: 2023-11-27

## 2023-11-27 VITALS
WEIGHT: 232 LBS | BODY MASS INDEX: 33.21 KG/M2 | RESPIRATION RATE: 16 BRPM | SYSTOLIC BLOOD PRESSURE: 128 MMHG | DIASTOLIC BLOOD PRESSURE: 80 MMHG | HEART RATE: 97 BPM | OXYGEN SATURATION: 98 % | HEIGHT: 70 IN | TEMPERATURE: 98.2 F

## 2023-11-27 DIAGNOSIS — Z98.890 S/P ANAL FISSURECTOMY: ICD-10-CM

## 2023-11-27 DIAGNOSIS — K60.3 FISTULA-IN-ANO: Primary | ICD-10-CM

## 2023-11-27 DIAGNOSIS — Z87.19 S/P ANAL FISSURECTOMY: ICD-10-CM

## 2023-11-27 PROCEDURE — 99024 POSTOP FOLLOW-UP VISIT: CPT | Performed by: SURGERY

## 2023-12-01 ENCOUNTER — NURSE ONLY (OUTPATIENT)
Dept: ALLERGY | Age: 58
End: 2023-12-01

## 2023-12-01 VITALS
DIASTOLIC BLOOD PRESSURE: 80 MMHG | SYSTOLIC BLOOD PRESSURE: 136 MMHG | HEART RATE: 92 BPM | OXYGEN SATURATION: 97 % | RESPIRATION RATE: 19 BRPM

## 2023-12-01 DIAGNOSIS — J30.81 CAT ALLERGIES: ICD-10-CM

## 2023-12-01 DIAGNOSIS — Z51.6 DESENSITIZATION TO ALLERGENS: Primary | ICD-10-CM

## 2023-12-01 DIAGNOSIS — Z91.09 MITE ALLERGY: ICD-10-CM

## 2023-12-01 NOTE — PROGRESS NOTES
After consent obtained/verified, allergy injection given in back of R/L arm(s). VIAL COLOR OF ALL VIALS TODAY IS silver 1:10,000    ALLERGY INJECTION FROM VIAL A GIVEN right  UPPER ARM IN THE AMOUNT OF 0.35 ML    ALLERGY INJECTION FROM VIAL B GIVEN left upper ARM IN THE AMOUNT OF 0.35 ml  ML          Documentation of vial injection specific to arm(s) noted on Allergy Immunotherapy Administration Form. Patient waited 30 minutes for observation. Yes      Patient tolerated well without adverse reaction WHILE IN OFFICE    SHOT REACTION TREATMENT INSTRUCTIONS    During the 30 minute wait after an allergy injection the following symptoms should be reported:    Itching other than at the injection site  Hives or swelling other than at the injection site  Redness other than at the injection site  Difficulty breathing  Chest tightness  Difficulty swallowing  Throat tightness    If these symptoms occur, NOTIFY PROVIDER and the following treatment should be administered:    1. Epinephrine/Auvi Q 1:1000 IM - 0.3 ml if > 66 lbs or more, 0.15 ml if 33 - 63 lbs, or 0.1 ml if <33 lbs     2. Diphenhydramine - give all intramuscular:     2 to <6 years (off-label use): 6.25 mg,    6 to <12 years: 12.5 to 25 mg;    ?12 years: 25-50 mg.    3.  Famotidine:  Adults 40 mg oral    Adolescents age 12 years and >88 lbs: 40 mg    Children and Adolescents ? 12years of age: Initial: 0.25 mg/kg/dose  every 12 hours (maximum daily dose: 40 mg/day)    Epi/Auvi Q dose may me repeated in 5-15 minutes if adequate resolution of symptoms does not occur    Patient should be observed for at least one hour after final Epi/Auvi Q dose and must be seen by provider. Patients cannot drive themselves if they have received diphenhydramine.

## 2023-12-05 ENCOUNTER — NURSE ONLY (OUTPATIENT)
Dept: ALLERGY | Age: 58
End: 2023-12-05
Payer: COMMERCIAL

## 2023-12-05 VITALS
DIASTOLIC BLOOD PRESSURE: 82 MMHG | OXYGEN SATURATION: 96 % | RESPIRATION RATE: 16 BRPM | SYSTOLIC BLOOD PRESSURE: 132 MMHG | HEART RATE: 74 BPM

## 2023-12-05 DIAGNOSIS — Z29.8 IMMUNOTHERAPY: ICD-10-CM

## 2023-12-05 DIAGNOSIS — Z51.6 DESENSITIZATION TO ALLERGENS: Primary | ICD-10-CM

## 2023-12-05 DIAGNOSIS — J30.9 CHRONIC ALLERGIC RHINITIS: Primary | ICD-10-CM

## 2023-12-05 DIAGNOSIS — J30.81 CAT ALLERGIES: ICD-10-CM

## 2023-12-05 DIAGNOSIS — Z91.09 MITE ALLERGY: ICD-10-CM

## 2023-12-05 PROCEDURE — 95117 IMMUNOTHERAPY INJECTIONS: CPT | Performed by: NURSE PRACTITIONER

## 2023-12-05 NOTE — PROGRESS NOTES
After consent obtained/verified, allergy injection given in back of R/L arm(s). VIAL COLOR OF ALL VIALS TODAY IS silver 1:10,000    ALLERGY INJECTION FROM VIAL A GIVEN left  UPPER ARM IN THE AMOUNT OF 0.40 ML    ALLERGY INJECTION FROM VIAL B GIVEN right upper ARM IN THE AMOUNT OF 0.40  ML      Documentation of vial injection specific to arm(s) noted on Allergy Immunotherapy Administration Form. Patient waited 30 minutes for observation. Yes      Patient tolerated well without adverse reaction WHILE IN OFFICE    SHOT REACTION TREATMENT INSTRUCTIONS    During the 30 minute wait after an allergy injection the following symptoms should be reported:    Itching other than at the injection site  Hives or swelling other than at the injection site  Redness other than at the injection site  Difficulty breathing  Chest tightness  Difficulty swallowing  Throat tightness    If these symptoms occur, NOTIFY PROVIDER and the following treatment should be administered:    1. Epinephrine/Auvi Q 1:1000 IM - 0.3 ml if > 66 lbs or more, 0.15 ml if 33 - 63 lbs, or 0.1 ml if <33 lbs     2. Diphenhydramine - give all intramuscular:     2 to <6 years (off-label use): 6.25 mg,    6 to <12 years: 12.5 to 25 mg;    ?12 years: 25-50 mg.    3.  Famotidine:  Adults 40 mg oral    Adolescents age 12 years and >88 lbs: 40 mg    Children and Adolescents ? 12years of age: Initial: 0.25 mg/kg/dose  every 12 hours (maximum daily dose: 40 mg/day)    Epi/Auvi Q dose may me repeated in 5-15 minutes if adequate resolution of symptoms does not occur    Patient should be observed for at least one hour after final Epi/Auvi Q dose and must be seen by provider. Patients cannot drive themselves if they have received diphenhydramine.

## 2023-12-07 ENCOUNTER — TELEPHONE (OUTPATIENT)
Dept: ALLERGY | Age: 58
End: 2023-12-07

## 2023-12-07 DIAGNOSIS — R50.9 ACUTE FEBRILE ILLNESS: Primary | ICD-10-CM

## 2023-12-07 NOTE — TELEPHONE ENCOUNTER
Patient called and report respiratory symptoms including chest tightness, shortness of breath associated with what with respiratory illness. Also complains of fever, nasal congestion. Has some nausea. Instructed patient to go to his PCP or urgent care where he may be checked for COVID and/or flu. I did offer patient a 2 PM appointment today which she said he would just go to [facility]. Patient to skip allergy shots today.

## 2023-12-07 NOTE — TELEPHONE ENCOUNTER
Patient called left a voicemail 12/6/23 @ 4:53 pm  saying he has some kind of illness going on. Cynthia Oelwein he got his shots on 12/5/23 and is not sure if it is a reaction from them or not. Attempted to call pt. Unable to reach, left message to call office.

## 2023-12-07 NOTE — TELEPHONE ENCOUNTER
Patient called back said he is running a fever 101.0, coughing, chest tightness and just does not feel good at all. Nose is sore, eyes are watering and feels like he could throw up at anytime. NO reaction at allergy injection sight at all. Patient is taking adrenal supplement he did not know if that has anything to do with is going on.

## 2023-12-26 ENCOUNTER — NURSE ONLY (OUTPATIENT)
Dept: ALLERGY | Age: 58
End: 2023-12-26
Payer: COMMERCIAL

## 2023-12-26 VITALS
OXYGEN SATURATION: 97 % | HEART RATE: 97 BPM | DIASTOLIC BLOOD PRESSURE: 72 MMHG | SYSTOLIC BLOOD PRESSURE: 128 MMHG | RESPIRATION RATE: 16 BRPM

## 2023-12-26 DIAGNOSIS — Z91.09 MITE ALLERGY: ICD-10-CM

## 2023-12-26 DIAGNOSIS — Z51.6 DESENSITIZATION TO ALLERGENS: Primary | ICD-10-CM

## 2023-12-26 DIAGNOSIS — J30.81 CAT ALLERGIES: ICD-10-CM

## 2023-12-26 PROCEDURE — 95117 IMMUNOTHERAPY INJECTIONS: CPT | Performed by: NURSE PRACTITIONER

## 2023-12-26 NOTE — PROGRESS NOTES
After consent obtained/verified, allergy injection given in back of R/L arm(s). VIAL COLOR OF ALL VIALS TODAY IS green 1:1,000    ALLERGY INJECTION FROM VIAL A GIVEN right  UPPER ARM IN THE AMOUNT OF 0.05 ML    ALLERGY INJECTION FROM VIAL B GIVEN left upper ARM IN THE AMOUNT OF 0.05  ML      Documentation of vial injection specific to arm(s) noted on Allergy Immunotherapy Administration Form. Patient waited 30 minutes for observation. Yes      Patient tolerated well without adverse reaction WHILE IN OFFICE    SHOT REACTION TREATMENT INSTRUCTIONS    During the 30 minute wait after an allergy injection the following symptoms should be reported:    Itching other than at the injection site  Hives or swelling other than at the injection site  Redness other than at the injection site  Difficulty breathing  Chest tightness  Difficulty swallowing  Throat tightness    If these symptoms occur, NOTIFY PROVIDER and the following treatment should be administered:    1. Epinephrine/Auvi Q 1:1000 IM - 0.3 ml if > 66 lbs or more, 0.15 ml if 33 - 63 lbs, or 0.1 ml if <33 lbs     2. Diphenhydramine - give all intramuscular:     2 to <6 years (off-label use): 6.25 mg,    6 to <12 years: 12.5 to 25 mg;    ?12 years: 25-50 mg.    3.  Famotidine:  Adults 40 mg oral    Adolescents age 12 years and >88 lbs: 40 mg    Children and Adolescents ? 12years of age: Initial: 0.25 mg/kg/dose  every 12 hours (maximum daily dose: 40 mg/day)    Epi/Auvi Q dose may me repeated in 5-15 minutes if adequate resolution of symptoms does not occur    Patient should be observed for at least one hour after final Epi/Auvi Q dose and must be seen by provider. Patients cannot drive themselves if they have received diphenhydramine.

## 2023-12-28 ENCOUNTER — NURSE ONLY (OUTPATIENT)
Dept: ALLERGY | Age: 58
End: 2023-12-28
Payer: COMMERCIAL

## 2023-12-28 VITALS
DIASTOLIC BLOOD PRESSURE: 78 MMHG | HEART RATE: 106 BPM | OXYGEN SATURATION: 98 % | RESPIRATION RATE: 18 BRPM | SYSTOLIC BLOOD PRESSURE: 130 MMHG

## 2023-12-28 DIAGNOSIS — Z91.09 MITE ALLERGY: ICD-10-CM

## 2023-12-28 DIAGNOSIS — Z51.6 DESENSITIZATION TO ALLERGENS: Primary | ICD-10-CM

## 2023-12-28 DIAGNOSIS — J30.81 CAT ALLERGIES: ICD-10-CM

## 2023-12-28 PROCEDURE — 95117 IMMUNOTHERAPY INJECTIONS: CPT | Performed by: NURSE PRACTITIONER

## 2023-12-28 NOTE — PROGRESS NOTES
After consent obtained/verified, allergy injection given in back of R/L arm(s). VIAL COLOR OF ALL VIALS TODAY IS green 1:1,000    ALLERGY INJECTION FROM VIAL A GIVEN left  UPPER ARM IN THE AMOUNT OF 0.10 ML    ALLERGY INJECTION FROM VIAL B GIVEN right upper ARM IN THE AMOUNT OF 0.05  ML    Documentation of vial injection specific to arm(s) noted on Allergy Immunotherapy Administration Form. Patient waited 30 minutes for observation. Yes      Patient tolerated well without adverse reaction WHILE IN OFFICE    SHOT REACTION TREATMENT INSTRUCTIONS    During the 30 minute wait after an allergy injection the following symptoms should be reported:    Itching other than at the injection site  Hives or swelling other than at the injection site  Redness other than at the injection site  Difficulty breathing  Chest tightness  Difficulty swallowing  Throat tightness    If these symptoms occur, NOTIFY PROVIDER and the following treatment should be administered:    1. Epinephrine/Auvi Q 1:1000 IM - 0.3 ml if > 66 lbs or more, 0.15 ml if 33 - 63 lbs, or 0.1 ml if <33 lbs     2. Diphenhydramine - give all intramuscular:     2 to <6 years (off-label use): 6.25 mg,    6 to <12 years: 12.5 to 25 mg;    ?12 years: 25-50 mg.    3.  Famotidine:  Adults 40 mg oral    Adolescents age 12 years and >88 lbs: 40 mg    Children and Adolescents ? 12years of age: Initial: 0.25 mg/kg/dose  every 12 hours (maximum daily dose: 40 mg/day)    Epi/Auvi Q dose may me repeated in 5-15 minutes if adequate resolution of symptoms does not occur    Patient should be observed for at least one hour after final Epi/Auvi Q dose and must be seen by provider. Patients cannot drive themselves if they have received diphenhydramine.

## 2024-01-09 ENCOUNTER — NURSE ONLY (OUTPATIENT)
Dept: ALLERGY | Age: 59
End: 2024-01-09
Payer: COMMERCIAL

## 2024-01-09 VITALS
OXYGEN SATURATION: 98 % | HEART RATE: 79 BPM | SYSTOLIC BLOOD PRESSURE: 132 MMHG | DIASTOLIC BLOOD PRESSURE: 82 MMHG | RESPIRATION RATE: 18 BRPM

## 2024-01-09 DIAGNOSIS — J30.9 CHRONIC ALLERGIC RHINITIS: Primary | ICD-10-CM

## 2024-01-09 DIAGNOSIS — J30.81 CAT ALLERGIES: ICD-10-CM

## 2024-01-09 DIAGNOSIS — Z29.8 IMMUNOTHERAPY: ICD-10-CM

## 2024-01-09 DIAGNOSIS — Z51.6 DESENSITIZATION TO ALLERGENS: Primary | ICD-10-CM

## 2024-01-09 DIAGNOSIS — Z91.09 MITE ALLERGY: ICD-10-CM

## 2024-01-09 PROCEDURE — 95117 IMMUNOTHERAPY INJECTIONS: CPT | Performed by: NURSE PRACTITIONER

## 2024-01-09 NOTE — PROGRESS NOTES
ALLERGY EXTRACT MIXING.  MONTHLY BILLING    DATE OF MIXING= 10/4/23  TOTAL NUMBER OF SET OF VIALS= 2  TOTAL VIALS PREPARED = 10  TOTAL INJECTABLE DOSES =   50 INJECTIONS PER SET  TOTAL ANTICIPATED DOSES = 100 INJECTIONS   TOTAL NUMBER OF INJECTIONS BILLED TODAY = 10     ALLERGY IMMUNOTHERAPY DOSES IS BILLED AT NO MORE THAN 30 UNITS PER MONTH.  TOTAL DOSES BILLED IS AT 30 DOSES PER MONTH UNTIL ALL ANTICIPATED DOSES ARE BILLED.     An allergy extract was prepared according to written prescription by provider.  Provider onsite during allergy extract preparation. Patient vial(s) include the following:     RED VIAL - 1:1 CONCENTRATION - EXPIRES 12 MONTHS  YELLOW VIAL-1:10 CONCENTRATION - EXPIRES 12 MONTHS  BLUE VIAL-1:100 CONCENTRATION - EXPIRES 12 MONTHS  GREEN VIAL-1:1,000 CONCENTRATION - EXPIRES 6 MONTHS  SILVER VIAL-1:10,000 CONCENTRATION - EXPIRES 6 MONTHS    Allergy extract was prepared by the following method:   RED TO YELLOW:  Once the  one-to-one concentration was prepared in the red vial, 0.5 ML's was then extracted in place into the yellow vial to achieve a 1:10 concentration.    YELLOW TO BLUE:  Then 0.5 ML's was extracted from the yellow vial and placed into the blue file with dilutant to achieve a 1:100 concentration.    BLUE TO GREEN:  Then 0.5 ML's was extracted from the blue vial and placed into Green vial with dilute to achieve a 1:1,000 concentration.    GREEN TO SILVER:  Then 0.5 ML's was taken from the green vial and placed in the Silver vial with dilutant to achieve a 1:10,000 concentration.      Patient vials were labeled with name, date-of-birth, vial (A,B,or C), concentration, and expiration.    When injecting from a new  vial the first injections is 0.05 ml and are increased by 0.05 increments until 0.5ml from the vial is achieved or the patient reaches maximum tolerated dose.   Injections are given once ot three times weekly and at least 24 hours apart, according to provider orders.   If

## 2024-01-09 NOTE — PROGRESS NOTES
After consent obtained/verified, allergy injection given in back of R/L arm(s).      VIAL COLOR OF ALL VIALS TODAY IS green 1:1,000    ALLERGY INJECTION FROM VIAL A GIVEN right  UPPER ARM IN THE AMOUNT OF 0.15 ML    ALLERGY INJECTION FROM VIAL B GIVEN left upper ARM IN THE AMOUNT OF 0.15  ML      Documentation of vial injection specific to arm(s) noted on Allergy Immunotherapy Administration Form.         Patient waited 30 minutes for observation.Yes      Patient tolerated well without adverse reaction WHILE IN OFFICE    SHOT REACTION TREATMENT INSTRUCTIONS    During the 30 minute wait after an allergy injection the following symptoms should be reported:    Itching other than at the injection site  Hives or swelling other than at the injection site  Redness other than at the injection site  Difficulty breathing  Chest tightness  Difficulty swallowing  Throat tightness    If these symptoms occur, NOTIFY PROVIDER and the following treatment should be administered:    1.  Epinephrine/Auvi Q 1:1000 IM - 0.3 ml if > 66 lbs or more, 0.15 ml if 33 - 63 lbs, or 0.1 ml if <33 lbs     2.  Diphenhydramine - give all intramuscular:     2 to <6 years (off-label use): 6.25 mg,    6 to <12 years: 12.5 to 25 mg;    ?12 years: 25-50 mg.    3.  Famotidine:  Adults 40 mg oral    Adolescents age 16 years and >88 lbs: 40 mg    Children and Adolescents ?16 years of age: Initial: 0.25 mg/kg/dose  every 12 hours (maximum daily dose: 40 mg/day)    Epi/Auvi Q dose may me repeated in 5-15 minutes if adequate resolution of symptoms does not occur    Patient should be observed for at least one hour after final Epi/Auvi Q dose and must be seen by provider.  Patients cannot drive themselves if they have received diphenhydramine.

## 2024-01-11 ENCOUNTER — NURSE ONLY (OUTPATIENT)
Dept: ALLERGY | Age: 59
End: 2024-01-11
Payer: COMMERCIAL

## 2024-01-11 VITALS
OXYGEN SATURATION: 98 % | HEART RATE: 90 BPM | SYSTOLIC BLOOD PRESSURE: 132 MMHG | RESPIRATION RATE: 18 BRPM | DIASTOLIC BLOOD PRESSURE: 86 MMHG

## 2024-01-11 DIAGNOSIS — J30.1 ALLERGIC RHINITIS DUE TO POLLEN, UNSPECIFIED SEASONALITY: ICD-10-CM

## 2024-01-11 DIAGNOSIS — J30.81 CAT ALLERGIES: Primary | ICD-10-CM

## 2024-01-11 DIAGNOSIS — Z91.09 MITE ALLERGY: ICD-10-CM

## 2024-01-11 PROCEDURE — 95117 IMMUNOTHERAPY INJECTIONS: CPT | Performed by: NURSE PRACTITIONER

## 2024-01-11 RX ORDER — VITAMIN B COMPLEX
1 TABLET ORAL 2 TIMES DAILY
COMMUNITY

## 2024-01-11 RX ORDER — CHLORAL HYDRATE 500 MG
1 CAPSULE ORAL
COMMUNITY

## 2024-01-11 NOTE — PROGRESS NOTES
PATIENT HAS THE FOLLOWING DIAGNOSIS SUPPORTING ADMINISTRATION OF ALLERGY INJECTIONS: J30.1Allergic rhinitis due to pollen  AND 71338 MULTIPLE ALLERGY INJECTIONS FOR ALLERGY INJECTION ADMINISTRATION      Patient here for allergy injection today.  Patient was presented with the opportunity to speak with provider and ask questions regarding allergy injections.  Patient was also instructed they need to wait 30 minutes after receiving allergy injections. All risks associated with potential adverse effects have been explained to patient and patient handout was provided following allergy testing.    After consent obtained/verified, allergy injection subcutaneously given in back of arm(s).  Please see below for specific details of site injections, concentration of serum, and volume injected.    VIAL COLOR OF ALL VIALS TODAY IS green 1:1,000. Vial allergy w/v concentration today is: 1:1,000     ALLERGY INJECTION FROM VIAL A GIVEN left  UPPER ARM IN THE AMOUNT OF 0.20 ML    ALLERGY INJECTION FROM VIAL B GIVEN right upper ARM IN THE AMOUNT OF 0.20  ML        Documentation of vial injection specific to arm(s) noted on Allergy Immunotherapy Administration Form.       Patient waited 30 minutes for observation.Yes  Patient has received information and verbalizes understanding of the potential  health risks associated with allergy injections . Patient understands risks including anaphylaxis and chooses not to wait 30 minutes after administration of allergy injection(s).    Patient tolerated well without adverse reaction while the patient was in the office.    SHOT REACTION TREATMENT INSTRUCTIONS    During the 30 minute wait after an allergy injection the following symptoms should be reported:    Itching other than at the injection site  Hives or swelling other than at the injection site  Redness other than at the injection site  Difficulty breathing  Chest tightness  Difficulty swallowing  Throat tightness    If these symptoms

## 2024-01-16 ENCOUNTER — NURSE ONLY (OUTPATIENT)
Dept: ALLERGY | Age: 59
End: 2024-01-16
Payer: COMMERCIAL

## 2024-01-16 VITALS
RESPIRATION RATE: 18 BRPM | HEART RATE: 93 BPM | SYSTOLIC BLOOD PRESSURE: 134 MMHG | OXYGEN SATURATION: 98 % | DIASTOLIC BLOOD PRESSURE: 82 MMHG

## 2024-01-16 DIAGNOSIS — J30.81 CAT ALLERGIES: ICD-10-CM

## 2024-01-16 DIAGNOSIS — Z91.09 MITE ALLERGY: ICD-10-CM

## 2024-01-16 DIAGNOSIS — J30.1 ALLERGIC RHINITIS DUE TO POLLEN, UNSPECIFIED SEASONALITY: Primary | ICD-10-CM

## 2024-01-16 PROCEDURE — 95117 IMMUNOTHERAPY INJECTIONS: CPT | Performed by: NURSE PRACTITIONER

## 2024-01-16 NOTE — PROGRESS NOTES
occur, NOTIFY PROVIDER and the following treatment should be administered:    1.  Epinephrine/Auvi Q 1:1000 IM - 0.3 ml if > 66 lbs or more, 0.15 ml if 33 - 63 lbs, or 0.1 ml if <33 lbs     2.  Diphenhydramine - give all intramuscular:     2 to <6 years (off-label use): 6.25 mg,    6 to <12 years: 12.5 to 25 mg;    ?12 years: 25-50 mg.    3.  Famotidine:  Adults 40 mg oral    Adolescents age 16 years and >88 lbs: 40 mg    Children and Adolescents ?16 years of age: Initial: 0.25 mg/kg/dose  every 12 hours (maximum daily dose: 40 mg/day)    Epi/Auvi Q dose may me repeated in 5-15 minutes if adequate resolution of symptoms does not occur    Patient should be observed for at least one hour after final Epi/Auvi Q dose and must be seen by provider.  Patients cannot drive themselves if they have received diphenhydramine.

## 2024-01-18 ENCOUNTER — NURSE ONLY (OUTPATIENT)
Dept: ALLERGY | Age: 59
End: 2024-01-18
Payer: COMMERCIAL

## 2024-01-18 VITALS
RESPIRATION RATE: 16 BRPM | OXYGEN SATURATION: 97 % | SYSTOLIC BLOOD PRESSURE: 128 MMHG | HEART RATE: 87 BPM | DIASTOLIC BLOOD PRESSURE: 82 MMHG

## 2024-01-18 DIAGNOSIS — J30.81 CAT ALLERGIES: ICD-10-CM

## 2024-01-18 DIAGNOSIS — J30.1 ALLERGIC RHINITIS DUE TO POLLEN, UNSPECIFIED SEASONALITY: Primary | ICD-10-CM

## 2024-01-18 DIAGNOSIS — Z91.09 MITE ALLERGY: ICD-10-CM

## 2024-01-18 PROCEDURE — 95117 IMMUNOTHERAPY INJECTIONS: CPT | Performed by: NURSE PRACTITIONER

## 2024-01-18 NOTE — PROGRESS NOTES
PATIENT HAS THE FOLLOWING DIAGNOSIS SUPPORTING ADMINISTRATION OF ALLERGY INJECTIONS: J30.1Allergic rhinitis due to pollen  AND 82715 MULTIPLE ALLERGY INJECTIONS FOR ALLERGY INJECTION ADMINISTRATION      Patient here for allergy injection today.  Patient was presented with the opportunity to speak with provider and ask questions regarding allergy injections.  Patient was also instructed they need to wait 30 minutes after receiving allergy injections. All risks associated with potential adverse effects have been explained to patient and patient handout was provided following allergy testing.    After consent obtained/verified, allergy injection subcutaneously given in back of arm(s).  Please see below for specific details of site injections, concentration of serum, and volume injected.    VIAL COLOR OF ALL VIALS TODAY IS green 1:1,000. Vial allergy w/v concentration today is: 1:1,000     ALLERGY INJECTION FROM VIAL A GIVEN left  UPPER ARM IN THE AMOUNT OF 0.30 ML    ALLERGY INJECTION FROM VIAL B GIVEN right upper ARM IN THE AMOUNT OF 0.30  ML      Documentation of vial injection specific to arm(s) noted on Allergy Immunotherapy Administration Form.       Patient waited 30 minutes for observation.Yes  Patient has received information and verbalizes understanding of the potential  health risks associated with allergy injections . Patient understands risks including anaphylaxis and chooses not to wait 30 minutes after administration of allergy injection(s).    Patient tolerated well without adverse reaction while the patient was in the office.    SHOT REACTION TREATMENT INSTRUCTIONS    During the 30 minute wait after an allergy injection the following symptoms should be reported:    Itching other than at the injection site  Hives or swelling other than at the injection site  Redness other than at the injection site  Difficulty breathing  Chest tightness  Difficulty swallowing  Throat tightness    If these symptoms occur,

## 2024-01-23 ENCOUNTER — NURSE ONLY (OUTPATIENT)
Dept: ALLERGY | Age: 59
End: 2024-01-23
Payer: COMMERCIAL

## 2024-01-23 VITALS
SYSTOLIC BLOOD PRESSURE: 153 MMHG | DIASTOLIC BLOOD PRESSURE: 92 MMHG | RESPIRATION RATE: 19 BRPM | HEART RATE: 93 BPM | OXYGEN SATURATION: 97 %

## 2024-01-23 DIAGNOSIS — Z91.09 OTHER ALLERGY, OTHER THAN TO MEDICINAL AGENTS: ICD-10-CM

## 2024-01-23 DIAGNOSIS — J30.1 SEASONAL ALLERGIC RHINITIS DUE TO POLLEN: ICD-10-CM

## 2024-01-23 DIAGNOSIS — J30.81 ALLERGIC RHINITIS DUE TO ANIMAL HAIR AND DANDER: Primary | ICD-10-CM

## 2024-01-23 PROCEDURE — 95117 IMMUNOTHERAPY INJECTIONS: CPT | Performed by: NURSE PRACTITIONER

## 2024-01-23 NOTE — PROGRESS NOTES
PATIENT HAS THE FOLLOWING DIAGNOSIS SUPPORTING ADMINISTRATION OF ALLERGY INJECTIONS: J30.1Allergic rhinitis due to pollen  AND 05842 MULTIPLE ALLERGY INJECTIONS FOR ALLERGY INJECTION ADMINISTRATION      Patient here for allergy injection today.  Patient was presented with the opportunity to speak with provider and ask questions regarding allergy injections.  Patient was also instructed they need to wait 30 minutes after receiving allergy injections. All risks associated with potential adverse effects have been explained to patient and patient handout was provided following allergy testing.    After consent obtained/verified, allergy injection subcutaneously given in back of arm(s).  Please see below for specific details of site injections, concentration of serum, and volume injected.    VIAL COLOR OF ALL VIALS TODAY IS green 1:1,000. Vial allergy w/v concentration today is: 1:1,000     ALLERGY INJECTION FROM VIAL A GIVEN right  UPPER ARM IN THE AMOUNT OF 0.35 ML    ALLERGY INJECTION FROM VIAL B GIVEN left upper ARM IN THE AMOUNT OF 0.35  ML          Documentation of vial injection specific to arm(s) noted on Allergy Immunotherapy Administration Form.       Patient waited 30 minutes for observation.Yes  Patient has received information and verbalizes understanding of the potential  health risks associated with allergy injections . Patient understands risks including anaphylaxis and chooses not to wait 30 minutes after administration of allergy injection(s).    Patient tolerated well without adverse reaction while the patient was in the office.    SHOT REACTION TREATMENT INSTRUCTIONS    During the 30 minute wait after an allergy injection the following symptoms should be reported:    Itching other than at the injection site  Hives or swelling other than at the injection site  Redness other than at the injection site  Difficulty breathing  Chest tightness  Difficulty swallowing  Throat tightness    If these symptoms

## 2024-01-23 NOTE — PROGRESS NOTES
[facility]    Mikey Joiner MD [facility]  General Surgery  Postprocedure Evaluation in Office  Pt Name: Placido Bravo  Date of Birth 1965   Today's Date:1/24/2024   Medical Record Number: [id number]  Referring Provider: No ref. provider found  Primary Care Provider: Lavonne Evans APRN - CNP  Chief Complaint   Patient presents with    Wound Check     S/P Transsphincteric ligation of chronic anal rectal fistula 11/2/23. Drainage and sore from site         ASSESSMENT      Problem List Items Addressed This Visit       Fistula-in-ano - Primary    S/P transsphincteric ligation of chronic anal fistula        PLAN       Pathology reviewed with the patient who understands. All questions were answered.    New Prescriptions    No medications on file      There are no Patient Instructions on file for this visit.    Follow up: We will keep his appointment for next week on Wednesday  Orders Placed This Encounter:  No orders of the defined types were placed in this encounter.        CHUCKIE Cummins is seen today for post-op follow-up.  Last seen December 18.  He had a complicated longstanding several year history of chronic draining fistula.  Difficult transsphincteric ligation of a markedly thickened and large fistula 5/8 of an inch around.  Long healing process but it looked nearly closed at last visit but called saying he still having some pain and some drainage want to be evaluated.  He says drainage is intermittent small amount it is not green or brown but white  Past Medical History  Past Medical History:   Diagnosis Date    Acute pancreatitis     september 2022    Anxiety     GERD (gastroesophageal reflux disease)     Hyperlipidemia     Hypertension     Patellar bursitis of left knee 08/08/2014    Psychiatric problem      Past Surgical History  Past Surgical History:   Procedure Laterality Date    ANUS SURGERY N/A 11/2/2023    Trans Sphincteric Ligation of Fistula performed by Mikey Joiner

## 2024-01-24 ENCOUNTER — OFFICE VISIT (OUTPATIENT)
Dept: SURGERY | Age: 59
End: 2024-01-24

## 2024-01-24 VITALS
HEIGHT: 70 IN | RESPIRATION RATE: 16 BRPM | DIASTOLIC BLOOD PRESSURE: 62 MMHG | WEIGHT: 230 LBS | OXYGEN SATURATION: 98 % | HEART RATE: 97 BPM | TEMPERATURE: 99.1 F | BODY MASS INDEX: 32.93 KG/M2 | SYSTOLIC BLOOD PRESSURE: 124 MMHG

## 2024-01-24 DIAGNOSIS — Z98.890 S/P ANAL FISSURECTOMY: ICD-10-CM

## 2024-01-24 DIAGNOSIS — Z87.19 S/P ANAL FISSURECTOMY: ICD-10-CM

## 2024-01-24 DIAGNOSIS — K60.3 FISTULA-IN-ANO: Primary | ICD-10-CM

## 2024-01-24 PROCEDURE — 99024 POSTOP FOLLOW-UP VISIT: CPT | Performed by: SURGERY

## 2024-01-25 ENCOUNTER — NURSE ONLY (OUTPATIENT)
Dept: ALLERGY | Age: 59
End: 2024-01-25
Payer: COMMERCIAL

## 2024-01-25 VITALS
HEART RATE: 82 BPM | SYSTOLIC BLOOD PRESSURE: 128 MMHG | OXYGEN SATURATION: 97 % | RESPIRATION RATE: 16 BRPM | DIASTOLIC BLOOD PRESSURE: 72 MMHG

## 2024-01-25 DIAGNOSIS — J30.81 CAT ALLERGIES: ICD-10-CM

## 2024-01-25 DIAGNOSIS — Z91.09 MITE ALLERGY: ICD-10-CM

## 2024-01-25 DIAGNOSIS — J30.1 ALLERGIC RHINITIS DUE TO POLLEN, UNSPECIFIED SEASONALITY: Primary | ICD-10-CM

## 2024-01-25 PROCEDURE — 95117 IMMUNOTHERAPY INJECTIONS: CPT | Performed by: NURSE PRACTITIONER

## 2024-01-25 NOTE — PROGRESS NOTES
NOTIFY PROVIDER and the following treatment should be administered:    1.  Epinephrine/Auvi Q 1:1000 IM - 0.3 ml if > 66 lbs or more, 0.15 ml if 33 - 63 lbs, or 0.1 ml if <33 lbs     2.  Diphenhydramine - give all intramuscular:     2 to <6 years (off-label use): 6.25 mg,    6 to <12 years: 12.5 to 25 mg;    ?12 years: 25-50 mg.    3.  Famotidine:  Adults 40 mg oral    Adolescents age 16 years and >88 lbs: 40 mg    Children and Adolescents ?16 years of age: Initial: 0.25 mg/kg/dose  every 12 hours (maximum daily dose: 40 mg/day)    Epi/Auvi Q dose may me repeated in 5-15 minutes if adequate resolution of symptoms does not occur    Patient should be observed for at least one hour after final Epi/Auvi Q dose and must be seen by provider.  Patients cannot drive themselves if they have received diphenhydramine.

## 2024-01-30 ENCOUNTER — NURSE ONLY (OUTPATIENT)
Dept: ALLERGY | Age: 59
End: 2024-01-30
Payer: COMMERCIAL

## 2024-01-30 VITALS
RESPIRATION RATE: 19 BRPM | HEART RATE: 86 BPM | OXYGEN SATURATION: 97 % | SYSTOLIC BLOOD PRESSURE: 149 MMHG | DIASTOLIC BLOOD PRESSURE: 91 MMHG

## 2024-01-30 DIAGNOSIS — J30.1 SEASONAL ALLERGIC RHINITIS DUE TO POLLEN: ICD-10-CM

## 2024-01-30 DIAGNOSIS — Z91.09 OTHER ALLERGY, OTHER THAN TO MEDICINAL AGENTS: ICD-10-CM

## 2024-01-30 DIAGNOSIS — J30.81 ALLERGIC RHINITIS DUE TO ANIMAL HAIR AND DANDER: Primary | ICD-10-CM

## 2024-01-30 PROCEDURE — 95117 IMMUNOTHERAPY INJECTIONS: CPT | Performed by: NURSE PRACTITIONER

## 2024-01-30 NOTE — PROGRESS NOTES
PATIENT HAS THE FOLLOWING DIAGNOSIS SUPPORTING ADMINISTRATION OF ALLERGY INJECTIONS: J30.1Allergic rhinitis due to pollen  AND 15986 MULTIPLE ALLERGY INJECTIONS FOR ALLERGY INJECTION ADMINISTRATION      Patient here for allergy injection today.  Patient was presented with the opportunity to speak with provider and ask questions regarding allergy injections.  Patient was also instructed they need to wait 30 minutes after receiving allergy injections. All risks associated with potential adverse effects have been explained to patient and patient handout was provided following allergy testing.    After consent obtained/verified, allergy injection subcutaneously given in back of arm(s).  Please see below for specific details of site injections, concentration of serum, and volume injected.    VIAL COLOR OF ALL VIALS TODAY IS green 1:1,000. Vial allergy w/v concentration today is: 1:1,000     ALLERGY INJECTION FROM VIAL A GIVEN right  UPPER ARM IN THE AMOUNT OF 0.45 ML    ALLERGY INJECTION FROM VIAL B GIVEN left upper ARM IN THE AMOUNT OF 0.45  ML        Documentation of vial injection specific to arm(s) noted on Allergy Immunotherapy Administration Form.       Patient waited 30 minutes for observation.Yes  Patient has received information and verbalizes understanding of the potential  health risks associated with allergy injections . Patient understands risks including anaphylaxis and chooses not to wait 30 minutes after administration of allergy injection(s).    Patient tolerated well without adverse reaction while the patient was in the office.    SHOT REACTION TREATMENT INSTRUCTIONS    During the 30 minute wait after an allergy injection the following symptoms should be reported:    Itching other than at the injection site  Hives or swelling other than at the injection site  Redness other than at the injection site  Difficulty breathing  Chest tightness  Difficulty swallowing  Throat tightness    If these symptoms

## 2024-02-01 ENCOUNTER — NURSE ONLY (OUTPATIENT)
Dept: ALLERGY | Age: 59
End: 2024-02-01
Payer: COMMERCIAL

## 2024-02-01 VITALS — HEART RATE: 85 BPM | SYSTOLIC BLOOD PRESSURE: 140 MMHG | DIASTOLIC BLOOD PRESSURE: 90 MMHG | OXYGEN SATURATION: 99 %

## 2024-02-01 DIAGNOSIS — Z91.09 MITE ALLERGY: ICD-10-CM

## 2024-02-01 DIAGNOSIS — J30.1 ALLERGIC RHINITIS DUE TO POLLEN, UNSPECIFIED SEASONALITY: Primary | ICD-10-CM

## 2024-02-01 DIAGNOSIS — J30.81 CAT ALLERGIES: ICD-10-CM

## 2024-02-01 PROCEDURE — 95117 IMMUNOTHERAPY INJECTIONS: CPT | Performed by: NURSE PRACTITIONER

## 2024-02-01 NOTE — PROGRESS NOTES
PATIENT HAS THE FOLLOWING DIAGNOSIS SUPPORTING ADMINISTRATION OF ALLERGY INJECTIONS: J30.1Allergic rhinitis due to pollen  AND 39650 MULTIPLE ALLERGY INJECTIONS FOR ALLERGY INJECTION ADMINISTRATION      Patient here for allergy injection today.  Patient was presented with the opportunity to speak with provider and ask questions regarding allergy injections.  Patient was also instructed they need to wait 30 minutes after receiving allergy injections. All risks associated with potential adverse effects have been explained to patient and patient handout was provided following allergy testing.    After consent obtained/verified, allergy injection subcutaneously given in back of arm(s).  Please see below for specific details of site injections, concentration of serum, and volume injected.    VIAL COLOR OF ALL VIALS TODAY IS green 1:1,000. Vial allergy w/v concentration today is: 1:1,000     ALLERGY INJECTION FROM VIAL A GIVEN left  UPPER ARM IN THE AMOUNT OF 0.50 ML    ALLERGY INJECTION FROM VIAL B GIVEN right upper ARM IN THE AMOUNT OF 0.50  ML      Documentation of vial injection specific to arm(s) noted on Allergy Immunotherapy Administration Form.       Patient waited 30 minutes for observation.No  Patient has received information and verbalizes understanding of the potential  health risks associated with allergy injections . Patient understands risks including anaphylaxis and chooses not to wait 30 minutes after administration of allergy injection(s).    Patient tolerated well without adverse reaction while the patient was in the office.    SHOT REACTION TREATMENT INSTRUCTIONS    During the 30 minute wait after an allergy injection the following symptoms should be reported:    Itching other than at the injection site  Hives or swelling other than at the injection site  Redness other than at the injection site  Difficulty breathing  Chest tightness  Difficulty swallowing  Throat tightness    If these symptoms occur,

## 2024-02-06 ENCOUNTER — NURSE ONLY (OUTPATIENT)
Dept: ALLERGY | Age: 59
End: 2024-02-06
Payer: COMMERCIAL

## 2024-02-06 VITALS
SYSTOLIC BLOOD PRESSURE: 147 MMHG | HEART RATE: 81 BPM | OXYGEN SATURATION: 98 % | DIASTOLIC BLOOD PRESSURE: 85 MMHG | RESPIRATION RATE: 16 BRPM

## 2024-02-06 DIAGNOSIS — J30.1 ALLERGIC RHINITIS DUE TO POLLEN, UNSPECIFIED SEASONALITY: Primary | ICD-10-CM

## 2024-02-06 DIAGNOSIS — Z91.09 MITE ALLERGY: ICD-10-CM

## 2024-02-06 DIAGNOSIS — J30.81 CAT ALLERGIES: ICD-10-CM

## 2024-02-06 PROCEDURE — 95117 IMMUNOTHERAPY INJECTIONS: CPT | Performed by: NURSE PRACTITIONER

## 2024-02-06 NOTE — PROGRESS NOTES
PATIENT HAS THE FOLLOWING DIAGNOSIS SUPPORTING ADMINISTRATION OF ALLERGY INJECTIONS: J30.1Allergic rhinitis due to pollen  AND 34425 MULTIPLE ALLERGY INJECTIONS FOR ALLERGY INJECTION ADMINISTRATION      Patient here for allergy injection today.  Patient was presented with the opportunity to speak with provider and ask questions regarding allergy injections.  Patient was also instructed they need to wait 30 minutes after receiving allergy injections. All risks associated with potential adverse effects have been explained to patient and patient handout was provided following allergy testing.    After consent obtained/verified, allergy injection subcutaneously given in back of arm(s).  Please see below for specific details of site injections, concentration of serum, and volume injected.    VIAL COLOR OF ALL VIALS TODAY IS blue 1:100. Vial allergy w/v concentration today is: 1:100     ALLERGY INJECTION FROM VIAL A GIVEN right  UPPER ARM IN THE AMOUNT OF 0.05 ML    ALLERGY INJECTION FROM VIAL B GIVEN left upper ARM IN THE AMOUNT OF 0.05  ML        Documentation of vial injection specific to arm(s) noted on Allergy Immunotherapy Administration Form.       Patient waited 30 minutes for observation.Yes  Patient has received information and verbalizes understanding of the potential  health risks associated with allergy injections . Patient understands risks including anaphylaxis and chooses not to wait 30 minutes after administration of allergy injection(s).    Patient tolerated well without adverse reaction while the patient was in the office.    SHOT REACTION TREATMENT INSTRUCTIONS    During the 30 minute wait after an allergy injection the following symptoms should be reported:    Itching other than at the injection site  Hives or swelling other than at the injection site  Redness other than at the injection site  Difficulty breathing  Chest tightness  Difficulty swallowing  Throat tightness    If these symptoms occur,

## 2024-02-07 ENCOUNTER — OFFICE VISIT (OUTPATIENT)
Dept: SURGERY | Age: 59
End: 2024-02-07

## 2024-02-07 VITALS
TEMPERATURE: 98.6 F | HEIGHT: 70 IN | DIASTOLIC BLOOD PRESSURE: 80 MMHG | RESPIRATION RATE: 16 BRPM | SYSTOLIC BLOOD PRESSURE: 142 MMHG | HEART RATE: 88 BPM | WEIGHT: 231.7 LBS | BODY MASS INDEX: 33.17 KG/M2 | OXYGEN SATURATION: 99 %

## 2024-02-07 DIAGNOSIS — Z98.890 S/P ANAL FISSURECTOMY: Primary | ICD-10-CM

## 2024-02-07 DIAGNOSIS — Z87.19 S/P ANAL FISSURECTOMY: Primary | ICD-10-CM

## 2024-02-07 PROCEDURE — 99024 POSTOP FOLLOW-UP VISIT: CPT | Performed by: SURGERY

## 2024-02-07 NOTE — PROGRESS NOTES
[facility]    Mikey Joiner MD [facility]  General Surgery  Postprocedure Evaluation in Office  Pt Name: Placido Bravo  Date of Birth 1965   Today's Date:2/7/2024   Medical Record Number: [id number]  Referring Provider: No ref. provider found  Primary Care Provider: Lavonne Evans APRN - CNP  Chief Complaint   Patient presents with    2 Week Follow-Up     S/P Transsphincteric ligation of chronic anal rectal fistula 11/2/23.  Last office visit 1/24/24         ASSESSMENT      Problem List Items Addressed This Visit       S/P transsphincteric ligation of chronic anal fistula - Primary        PLAN       Pathology reviewed with the patient who understands. All questions were answered.    New Prescriptions    No medications on file      There are no Patient Instructions on file for this visit.    Follow up: We will keep his appointment for next week on Wednesday  Orders Placed This Encounter:  No orders of the defined types were placed in this encounter.        SUBJECTIVE      Placido is seen today for post-op follow-up.  Last seen 2 weeks ago.  He had a complicated longstanding several year history of chronic draining fistula.  Difficult transsphincteric ligation of a markedly thickened and large fistula 5/8 of an inch around.  Long healing process but it looked nearly closed at last visit but called saying he still having some pain and some drainage want to be evaluated.  He says drainage is intermittent small amount it is not green or brown but white.  When we saw him in the office 2 weeks ago I could find no evidence convincing for a recurrent fistula just some sites that had not completely healed they were treated with silver nitrate he is back in the office for reevaluation.  Past Medical History  Past Medical History:   Diagnosis Date    Acute pancreatitis     september 2022    Anxiety     GERD (gastroesophageal reflux disease)     Hyperlipidemia     Hypertension     Patellar bursitis of left

## 2024-02-08 ENCOUNTER — NURSE ONLY (OUTPATIENT)
Dept: ALLERGY | Age: 59
End: 2024-02-08
Payer: COMMERCIAL

## 2024-02-08 VITALS — HEART RATE: 75 BPM | SYSTOLIC BLOOD PRESSURE: 140 MMHG | DIASTOLIC BLOOD PRESSURE: 90 MMHG | OXYGEN SATURATION: 97 %

## 2024-02-08 DIAGNOSIS — J30.81 CAT ALLERGIES: ICD-10-CM

## 2024-02-08 DIAGNOSIS — Z91.09 MITE ALLERGY: ICD-10-CM

## 2024-02-08 DIAGNOSIS — J30.1 ALLERGIC RHINITIS DUE TO POLLEN, UNSPECIFIED SEASONALITY: Primary | ICD-10-CM

## 2024-02-08 PROCEDURE — 95117 IMMUNOTHERAPY INJECTIONS: CPT | Performed by: NURSE PRACTITIONER

## 2024-02-08 NOTE — PROGRESS NOTES
PATIENT HAS THE FOLLOWING DIAGNOSIS SUPPORTING ADMINISTRATION OF ALLERGY INJECTIONS: J30.1Allergic rhinitis due to pollen  AND 52517 MULTIPLE ALLERGY INJECTIONS FOR ALLERGY INJECTION ADMINISTRATION      Patient here for allergy injection today.  Patient was presented with the opportunity to speak with provider and ask questions regarding allergy injections.  Patient was also instructed they need to wait 30 minutes after receiving allergy injections. All risks associated with potential adverse effects have been explained to patient and patient handout was provided following allergy testing.    After consent obtained/verified, allergy injection subcutaneously given in back of arm(s).  Please see below for specific details of site injections, concentration of serum, and volume injected.    VIAL COLOR OF ALL VIALS TODAY IS blue 1:100. Vial allergy w/v concentration today is: 1:100     ALLERGY INJECTION FROM VIAL A GIVEN left  UPPER ARM IN THE AMOUNT OF 0.10 ML    ALLERGY INJECTION FROM VIAL B GIVEN right upper ARM IN THE AMOUNT OF 0.10  ML      Documentation of vial injection specific to arm(s) noted on Allergy Immunotherapy Administration Form.       Patient waited 30 minutes for observation.No  Patient has received information and verbalizes understanding of the potential  health risks associated with allergy injections . Patient understands risks including anaphylaxis and chooses not to wait 30 minutes after administration of allergy injection(s).    Patient tolerated well without adverse reaction while the patient was in the office.    SHOT REACTION TREATMENT INSTRUCTIONS    During the 30 minute wait after an allergy injection the following symptoms should be reported:    Itching other than at the injection site  Hives or swelling other than at the injection site  Redness other than at the injection site  Difficulty breathing  Chest tightness  Difficulty swallowing  Throat tightness    If these symptoms occur,

## 2024-02-13 ENCOUNTER — NURSE ONLY (OUTPATIENT)
Dept: ALLERGY | Age: 59
End: 2024-02-13
Payer: COMMERCIAL

## 2024-02-13 VITALS — OXYGEN SATURATION: 98 % | DIASTOLIC BLOOD PRESSURE: 98 MMHG | SYSTOLIC BLOOD PRESSURE: 144 MMHG | HEART RATE: 80 BPM

## 2024-02-13 DIAGNOSIS — J30.1 ALLERGIC RHINITIS DUE TO POLLEN, UNSPECIFIED SEASONALITY: Primary | ICD-10-CM

## 2024-02-13 DIAGNOSIS — Z91.09 MITE ALLERGY: ICD-10-CM

## 2024-02-13 DIAGNOSIS — J30.81 CAT ALLERGIES: ICD-10-CM

## 2024-02-13 PROCEDURE — 95117 IMMUNOTHERAPY INJECTIONS: CPT | Performed by: NURSE PRACTITIONER

## 2024-02-13 NOTE — PROGRESS NOTES
PATIENT HAS THE FOLLOWING DIAGNOSIS SUPPORTING ADMINISTRATION OF ALLERGY INJECTIONS: J30.1Allergic rhinitis due to pollen  AND 63738 MULTIPLE ALLERGY INJECTIONS FOR ALLERGY INJECTION ADMINISTRATION      Patient here for allergy injection today.  Patient was presented with the opportunity to speak with provider and ask questions regarding allergy injections.  Patient was also instructed they need to wait 30 minutes after receiving allergy injections. All risks associated with potential adverse effects have been explained to patient and patient handout was provided following allergy testing.    After consent obtained/verified, allergy injection subcutaneously given in back of arm(s).  Please see below for specific details of site injections, concentration of serum, and volume injected.    VIAL COLOR OF ALL VIALS TODAY IS blue 1:100. Vial allergy w/v concentration today is: 1:100     ALLERGY INJECTION FROM VIAL A GIVEN right  UPPER ARM IN THE AMOUNT OF 0.15 ML    ALLERGY INJECTION FROM VIAL B GIVEN left upper ARM IN THE AMOUNT OF 0.15  ML        Documentation of vial injection specific to arm(s) noted on Allergy Immunotherapy Administration Form.       Patient waited 30 minutes for observation.No  Patient has received information and verbalizes understanding of the potential  health risks associated with allergy injections . Patient understands risks including anaphylaxis and chooses not to wait 30 minutes after administration of allergy injection(s).    Patient tolerated well without adverse reaction while the patient was in the office.    SHOT REACTION TREATMENT INSTRUCTIONS    During the 30 minute wait after an allergy injection the following symptoms should be reported:    Itching other than at the injection site  Hives or swelling other than at the injection site  Redness other than at the injection site  Difficulty breathing  Chest tightness  Difficulty swallowing  Throat tightness    If these symptoms occur,

## 2024-02-15 ENCOUNTER — NURSE ONLY (OUTPATIENT)
Dept: ALLERGY | Age: 59
End: 2024-02-15
Payer: COMMERCIAL

## 2024-02-15 VITALS
SYSTOLIC BLOOD PRESSURE: 138 MMHG | RESPIRATION RATE: 16 BRPM | DIASTOLIC BLOOD PRESSURE: 80 MMHG | OXYGEN SATURATION: 97 % | HEART RATE: 77 BPM

## 2024-02-15 DIAGNOSIS — Z91.09 MITE ALLERGY: ICD-10-CM

## 2024-02-15 DIAGNOSIS — J30.81 CAT ALLERGIES: ICD-10-CM

## 2024-02-15 DIAGNOSIS — J30.1 ALLERGIC RHINITIS DUE TO POLLEN, UNSPECIFIED SEASONALITY: Primary | ICD-10-CM

## 2024-02-15 PROCEDURE — 95117 IMMUNOTHERAPY INJECTIONS: CPT | Performed by: NURSE PRACTITIONER

## 2024-02-15 NOTE — PROGRESS NOTES
PATIENT HAS THE FOLLOWING DIAGNOSIS SUPPORTING ADMINISTRATION OF ALLERGY INJECTIONS: J30.1Allergic rhinitis due to pollen  AND [id number] MULTIPLE ALLERGY INJECTIONS FOR ALLERGY INJECTION ADMINISTRATION      Patient here for allergy injection today.  Patient was presented with the opportunity to speak with provider and ask questions regarding allergy injections.  Patient was also instructed they need to wait 30 minutes after receiving allergy injections. All risks associated with potential adverse effects have been explained to patient and patient handout was provided following allergy testing.    After consent obtained/verified, allergy injection subcutaneously given in back of arm(s).  Please see below for specific details of site injections, concentration of serum, and volume injected.    VIAL COLOR OF ALL VIALS TODAY IS blue 1:100. Vial allergy w/v concentration today is: 1:100     ALLERGY INJECTION FROM VIAL A GIVEN left  UPPER ARM IN THE AMOUNT OF 0.20 ML    ALLERGY INJECTION FROM VIAL B GIVEN right upper ARM IN THE AMOUNT OF 0.20  ML        Documentation of vial injection specific to arm(s) noted on Allergy Immunotherapy Administration Form.       Patient waited 30 minutes for observation.Yes  Patient has received information and verbalizes understanding of the potential  health risks associated with allergy injections . Patient understands risks including anaphylaxis and chooses not to wait 30 minutes after administration of allergy injection(s).    Patient tolerated well without adverse reaction while the patient was in the office.    SHOT REACTION TREATMENT INSTRUCTIONS    During the 30 minute wait after an allergy injection the following symptoms should be reported:    Itching other than at the injection site  Hives or swelling other than at the injection site  Redness other than at the injection site  Difficulty breathing  Chest tightness  Difficulty swallowing  Throat tightness    If these symptoms occur,

## 2024-02-20 ENCOUNTER — NURSE ONLY (OUTPATIENT)
Dept: ALLERGY | Age: 59
End: 2024-02-20
Payer: COMMERCIAL

## 2024-02-20 VITALS
SYSTOLIC BLOOD PRESSURE: 154 MMHG | HEART RATE: 73 BPM | BODY MASS INDEX: 33.07 KG/M2 | DIASTOLIC BLOOD PRESSURE: 99 MMHG | HEIGHT: 70 IN | WEIGHT: 231 LBS

## 2024-02-20 DIAGNOSIS — J30.81 ALLERGIC RHINITIS DUE TO ANIMAL HAIR AND DANDER: Primary | ICD-10-CM

## 2024-02-20 DIAGNOSIS — Z91.09 OTHER ALLERGY, OTHER THAN TO MEDICINAL AGENTS: ICD-10-CM

## 2024-02-20 DIAGNOSIS — J30.1 SEASONAL ALLERGIC RHINITIS DUE TO POLLEN: ICD-10-CM

## 2024-02-20 PROCEDURE — 95117 IMMUNOTHERAPY INJECTIONS: CPT | Performed by: NURSE PRACTITIONER

## 2024-02-20 NOTE — PROGRESS NOTES
PATIENT HAS THE FOLLOWING DIAGNOSIS SUPPORTING ADMINISTRATION OF ALLERGY INJECTIONS: J30.1Allergic rhinitis due to pollen  AND [id number] MULTIPLE ALLERGY INJECTIONS FOR ALLERGY INJECTION ADMINISTRATION      Patient here for allergy injection today.  Patient was presented with the opportunity to speak with provider and ask questions regarding allergy injections.  Patient was also instructed they need to wait 30 minutes after receiving allergy injections. All risks associated with potential adverse effects have been explained to patient and patient handout was provided following allergy testing.    After consent obtained/verified, allergy injection subcutaneously given in back of arm(s).  Please see below for specific details of site injections, concentration of serum, and volume injected.    VIAL COLOR OF ALL VIALS TODAY IS blue 1:100. Vial allergy w/v concentration today is: 1:100     ALLERGY INJECTION FROM VIAL A GIVEN right  UPPER ARM IN THE AMOUNT OF 0.25 ML    ALLERGY INJECTION FROM VIAL B GIVEN left upper ARM IN THE AMOUNT OF 0.25  ML        Documentation of vial injection specific to arm(s) noted on Allergy Immunotherapy Administration Form.       Patient waited 30 minutes for observation.Yes  Patient has received information and verbalizes understanding of the potential  health risks associated with allergy injections . Patient understands risks including anaphylaxis and chooses not to wait 30 minutes after administration of allergy injection(s).    Patient tolerated well without adverse reaction while the patient was in the office.    SHOT REACTION TREATMENT INSTRUCTIONS    During the 30 minute wait after an allergy injection the following symptoms should be reported:    Itching other than at the injection site  Hives or swelling other than at the injection site  Redness other than at the injection site  Difficulty breathing  Chest tightness  Difficulty swallowing  Throat tightness    If these symptoms occur,

## 2024-02-27 ENCOUNTER — NURSE ONLY (OUTPATIENT)
Dept: ALLERGY | Age: 59
End: 2024-02-27
Payer: COMMERCIAL

## 2024-02-27 VITALS — OXYGEN SATURATION: 97 % | SYSTOLIC BLOOD PRESSURE: 166 MMHG | HEART RATE: 80 BPM | DIASTOLIC BLOOD PRESSURE: 89 MMHG

## 2024-02-27 DIAGNOSIS — J30.1 SEASONAL ALLERGIC RHINITIS DUE TO POLLEN: ICD-10-CM

## 2024-02-27 DIAGNOSIS — Z91.09 OTHER ALLERGY, OTHER THAN TO MEDICINAL AGENTS: ICD-10-CM

## 2024-02-27 DIAGNOSIS — J30.81 ALLERGIC RHINITIS DUE TO ANIMAL HAIR AND DANDER: ICD-10-CM

## 2024-02-27 DIAGNOSIS — J30.9 CHRONIC ALLERGIC RHINITIS: Primary | ICD-10-CM

## 2024-02-27 PROCEDURE — 95117 IMMUNOTHERAPY INJECTIONS: CPT | Performed by: NURSE PRACTITIONER

## 2024-02-27 NOTE — PROGRESS NOTES
PATIENT HAS THE FOLLOWING DIAGNOSIS SUPPORTING ADMINISTRATION OF ALLERGY INJECTIONS: j30.2 AND [id number] MULTIPLE ALLERGY INJECTIONS FOR ALLERGY INJECTION ADMINISTRATION      Patient here for allergy injection today.  Patient was presented with the opportunity to speak with provider and ask questions regarding allergy injections.  Patient was also instructed they need to wait 30 minutes after receiving allergy injections. All risks associated with potential adverse effects have been explained to patient and patient handout was provided following allergy testing.    After consent obtained/verified, allergy injection subcutaneously given in back of arm(s).  Please see below for specific details of site injections, concentration of serum, and volume injected.    VIAL COLOR OF ALL VIALS TODAY IS blue 1:100. Vial allergy w/v concentration today is: 1:100     ALLERGY INJECTION FROM VIAL A GIVEN left  UPPER ARM IN THE AMOUNT OF 0.30 ML    ALLERGY INJECTION FROM VIAL B GIVEN right upper ARM IN THE AMOUNT OF 0.30  ML        Documentation of vial injection specific to arm(s) noted on Allergy Immunotherapy Administration Form.       Patient waited 30 minutes for observation.No  Patient has received information and verbalizes understanding of the potential  health risks associated with allergy injections . Patient understands risks including anaphylaxis and chooses not to wait 30 minutes after administration of allergy injection(s).    Patient tolerated well without adverse reaction while the patient was in the office.    SHOT REACTION TREATMENT INSTRUCTIONS    During the 30 minute wait after an allergy injection the following symptoms should be reported:    Itching other than at the injection site  Hives or swelling other than at the injection site  Redness other than at the injection site  Difficulty breathing  Chest tightness  Difficulty swallowing  Throat tightness    If these symptoms occur, NOTIFY PROVIDER and the following

## 2024-02-29 ENCOUNTER — NURSE ONLY (OUTPATIENT)
Dept: ALLERGY | Age: 59
End: 2024-02-29
Payer: COMMERCIAL

## 2024-02-29 VITALS — HEART RATE: 85 BPM | OXYGEN SATURATION: 98 % | SYSTOLIC BLOOD PRESSURE: 145 MMHG | DIASTOLIC BLOOD PRESSURE: 84 MMHG

## 2024-02-29 DIAGNOSIS — J30.1 SEASONAL ALLERGIC RHINITIS DUE TO POLLEN: ICD-10-CM

## 2024-02-29 DIAGNOSIS — J30.9 CHRONIC ALLERGIC RHINITIS: Primary | ICD-10-CM

## 2024-02-29 DIAGNOSIS — J30.81 ALLERGIC RHINITIS DUE TO ANIMAL HAIR AND DANDER: ICD-10-CM

## 2024-02-29 DIAGNOSIS — Z91.09 OTHER ALLERGY, OTHER THAN TO MEDICINAL AGENTS: ICD-10-CM

## 2024-02-29 PROCEDURE — 95117 IMMUNOTHERAPY INJECTIONS: CPT | Performed by: NURSE PRACTITIONER

## 2024-02-29 NOTE — PROGRESS NOTES
PATIENT HAS THE FOLLOWING DIAGNOSIS SUPPORTING ADMINISTRATION OF ALLERGY INJECTIONS: J30.1Allergic rhinitis due to pollen  AND [id number] MULTIPLE ALLERGY INJECTIONS FOR ALLERGY INJECTION ADMINISTRATION      Patient here for allergy injection today.  Patient was presented with the opportunity to speak with provider and ask questions regarding allergy injections.  Patient was also instructed they need to wait 30 minutes after receiving allergy injections. All risks associated with potential adverse effects have been explained to patient and patient handout was provided following allergy testing.    After consent obtained/verified, allergy injection subcutaneously given in back of arm(s).  Please see below for specific details of site injections, concentration of serum, and volume injected.    VIAL COLOR OF ALL VIALS TODAY IS blue 1:100. Vial allergy w/v concentration today is: 1:100     ALLERGY INJECTION FROM VIAL A GIVEN right  UPPER ARM IN THE AMOUNT OF 0.35 ML    ALLERGY INJECTION FROM VIAL B GIVEN left upper ARM IN THE AMOUNT OF 0.35  ML          Documentation of vial injection specific to arm(s) noted on Allergy Immunotherapy Administration Form.       Patient waited 30 minutes for observation.Yes  Patient has received information and verbalizes understanding of the potential  health risks associated with allergy injections . Patient understands risks including anaphylaxis and chooses not to wait 30 minutes after administration of allergy injection(s).    Patient tolerated well without adverse reaction while the patient was in the office.    SHOT REACTION TREATMENT INSTRUCTIONS    During the 30 minute wait after an allergy injection the following symptoms should be reported:    Itching other than at the injection site  Hives or swelling other than at the injection site  Redness other than at the injection site  Difficulty breathing  Chest tightness  Difficulty swallowing  Throat tightness    If these symptoms occur,

## 2024-03-05 ENCOUNTER — NURSE ONLY (OUTPATIENT)
Dept: ALLERGY | Age: 59
End: 2024-03-05
Payer: COMMERCIAL

## 2024-03-05 VITALS
RESPIRATION RATE: 17 BRPM | DIASTOLIC BLOOD PRESSURE: 83 MMHG | SYSTOLIC BLOOD PRESSURE: 139 MMHG | HEART RATE: 80 BPM | OXYGEN SATURATION: 97 %

## 2024-03-05 DIAGNOSIS — J30.1 SEASONAL ALLERGIC RHINITIS DUE TO POLLEN: ICD-10-CM

## 2024-03-05 DIAGNOSIS — J30.9 CHRONIC ALLERGIC RHINITIS: Primary | ICD-10-CM

## 2024-03-05 DIAGNOSIS — J30.81 ALLERGIC RHINITIS DUE TO ANIMAL HAIR AND DANDER: ICD-10-CM

## 2024-03-05 PROCEDURE — 95117 IMMUNOTHERAPY INJECTIONS: CPT | Performed by: NURSE PRACTITIONER

## 2024-03-05 RX ORDER — QUETIAPINE FUMARATE 300 MG/1
300 TABLET, FILM COATED ORAL NIGHTLY
COMMUNITY
Start: 2024-03-04

## 2024-03-05 NOTE — PROGRESS NOTES
PATIENT HAS THE FOLLOWING DIAGNOSIS SUPPORTING ADMINISTRATION OF ALLERGY INJECTIONS: J30.1Allergic rhinitis due to pollen  AND [id number] MULTIPLE ALLERGY INJECTIONS FOR ALLERGY INJECTION ADMINISTRATION      Patient here for allergy injection today.  Patient was presented with the opportunity to speak with provider and ask questions regarding allergy injections.  Patient was also instructed they need to wait 30 minutes after receiving allergy injections. All risks associated with potential adverse effects have been explained to patient and patient handout was provided following allergy testing.    After consent obtained/verified, allergy injection subcutaneously given in back of arm(s).  Please see below for specific details of site injections, concentration of serum, and volume injected.    VIAL COLOR OF ALL VIALS TODAY IS Blue 1:100. Vial allergy w/v concentration today is: 1:100     ALLERGY INJECTION FROM VIAL A GIVEN left  UPPER ARM IN THE AMOUNT OF 0.40 ML    ALLERGY INJECTION FROM VIAL B GIVEN right upper ARM IN THE AMOUNT OF 0.40  ML        Documentation of vial injection specific to arm(s) noted on Allergy Immunotherapy Administration Form.       Patient waited 30 minutes for observation.Yes  Patient has received information and verbalizes understanding of the potential  health risks associated with allergy injections . Patient understands risks including anaphylaxis and chooses not to wait 30 minutes after administration of allergy injection(s).    Patient tolerated well without adverse reaction while the patient was in the office.    SHOT REACTION TREATMENT INSTRUCTIONS    During the 30 minute wait after an allergy injection the following symptoms should be reported:    Itching other than at the injection site  Hives or swelling other than at the injection site  Redness other than at the injection site  Difficulty breathing  Chest tightness  Difficulty swallowing  Throat tightness    If these symptoms occur,

## 2024-03-07 ENCOUNTER — NURSE ONLY (OUTPATIENT)
Dept: ALLERGY | Age: 59
End: 2024-03-07
Payer: COMMERCIAL

## 2024-03-07 VITALS — HEART RATE: 72 BPM | DIASTOLIC BLOOD PRESSURE: 90 MMHG | SYSTOLIC BLOOD PRESSURE: 145 MMHG

## 2024-03-07 DIAGNOSIS — J30.81 ALLERGIC RHINITIS DUE TO ANIMAL HAIR AND DANDER: ICD-10-CM

## 2024-03-07 DIAGNOSIS — J30.9 CHRONIC ALLERGIC RHINITIS: Primary | ICD-10-CM

## 2024-03-07 DIAGNOSIS — J30.1 SEASONAL ALLERGIC RHINITIS DUE TO POLLEN: ICD-10-CM

## 2024-03-07 PROCEDURE — 95117 IMMUNOTHERAPY INJECTIONS: CPT | Performed by: NURSE PRACTITIONER

## 2024-03-07 NOTE — PROGRESS NOTES
PATIENT HAS THE FOLLOWING DIAGNOSIS SUPPORTING ADMINISTRATION OF ALLERGY INJECTIONS: J30.1Allergic rhinitis due to pollen  AND [id number] MULTIPLE ALLERGY INJECTIONS FOR ALLERGY INJECTION ADMINISTRATION      Patient here for allergy injection today.  Patient was presented with the opportunity to speak with provider and ask questions regarding allergy injections.  Patient was also instructed they need to wait 30 minutes after receiving allergy injections. All risks associated with potential adverse effects have been explained to patient and patient handout was provided following allergy testing.    After consent obtained/verified, allergy injection subcutaneously given in back of arm(s).  Please see below for specific details of site injections, concentration of serum, and volume injected.    VIAL COLOR OF ALL VIALS TODAY IS blue 1:100. Vial allergy w/v concentration today is: 1:100     ALLERGY INJECTION FROM VIAL A GIVEN right  UPPER ARM IN THE AMOUNT OF 0.45 ML    ALLERGY INJECTION FROM VIAL B GIVEN left upper ARM IN THE AMOUNT OF 0.45  ML    Documentation of vial injection specific to arm(s) noted on Allergy Immunotherapy Administration Form.       Patient waited 30 minutes for observation.Yes  Patient has received information and verbalizes understanding of the potential  health risks associated with allergy injections . Patient understands risks including anaphylaxis and chooses not to wait 30 minutes after administration of allergy injection(s).    Patient tolerated well without adverse reaction while the patient was in the office.    SHOT REACTION TREATMENT INSTRUCTIONS    During the 30 minute wait after an allergy injection the following symptoms should be reported:    Itching other than at the injection site  Hives or swelling other than at the injection site  Redness other than at the injection site  Difficulty breathing  Chest tightness  Difficulty swallowing  Throat tightness    If these symptoms occur, NOTIFY

## 2024-03-12 ENCOUNTER — NURSE ONLY (OUTPATIENT)
Dept: ALLERGY | Age: 59
End: 2024-03-12
Payer: COMMERCIAL

## 2024-03-12 VITALS
RESPIRATION RATE: 17 BRPM | SYSTOLIC BLOOD PRESSURE: 166 MMHG | HEART RATE: 72 BPM | DIASTOLIC BLOOD PRESSURE: 94 MMHG | OXYGEN SATURATION: 98 %

## 2024-03-12 DIAGNOSIS — J30.81 ALLERGIC RHINITIS DUE TO ANIMAL HAIR AND DANDER: ICD-10-CM

## 2024-03-12 DIAGNOSIS — J30.1 SEASONAL ALLERGIC RHINITIS DUE TO POLLEN: ICD-10-CM

## 2024-03-12 DIAGNOSIS — J30.9 CHRONIC ALLERGIC RHINITIS: Primary | ICD-10-CM

## 2024-03-12 PROCEDURE — 95117 IMMUNOTHERAPY INJECTIONS: CPT | Performed by: NURSE PRACTITIONER

## 2024-03-14 ENCOUNTER — NURSE ONLY (OUTPATIENT)
Dept: ALLERGY | Age: 59
End: 2024-03-14
Payer: COMMERCIAL

## 2024-03-14 VITALS
HEART RATE: 77 BPM | OXYGEN SATURATION: 98 % | SYSTOLIC BLOOD PRESSURE: 152 MMHG | DIASTOLIC BLOOD PRESSURE: 88 MMHG | RESPIRATION RATE: 17 BRPM

## 2024-03-14 DIAGNOSIS — J30.81 ALLERGIC RHINITIS DUE TO ANIMAL HAIR AND DANDER: ICD-10-CM

## 2024-03-14 DIAGNOSIS — J30.9 CHRONIC ALLERGIC RHINITIS: Primary | ICD-10-CM

## 2024-03-14 DIAGNOSIS — J30.1 SEASONAL ALLERGIC RHINITIS DUE TO POLLEN: ICD-10-CM

## 2024-03-14 PROCEDURE — 95117 IMMUNOTHERAPY INJECTIONS: CPT | Performed by: NURSE PRACTITIONER

## 2024-03-19 ENCOUNTER — NURSE ONLY (OUTPATIENT)
Dept: ALLERGY | Age: 59
End: 2024-03-19
Payer: COMMERCIAL

## 2024-03-19 VITALS
HEART RATE: 79 BPM | OXYGEN SATURATION: 95 % | SYSTOLIC BLOOD PRESSURE: 149 MMHG | DIASTOLIC BLOOD PRESSURE: 96 MMHG | RESPIRATION RATE: 18 BRPM

## 2024-03-19 DIAGNOSIS — J30.81 ALLERGIC RHINITIS DUE TO ANIMAL HAIR AND DANDER: ICD-10-CM

## 2024-03-19 DIAGNOSIS — J30.1 SEASONAL ALLERGIC RHINITIS DUE TO POLLEN: ICD-10-CM

## 2024-03-19 DIAGNOSIS — J30.9 CHRONIC ALLERGIC RHINITIS: Primary | ICD-10-CM

## 2024-03-19 PROCEDURE — 95117 IMMUNOTHERAPY INJECTIONS: CPT | Performed by: NURSE PRACTITIONER

## 2024-03-19 NOTE — PROGRESS NOTES
PATIENT HAS THE FOLLOWING DIAGNOSIS SUPPORTING ADMINISTRATION OF ALLERGY INJECTIONS: J30.1Allergic rhinitis due to pollen  AND [id number] MULTIPLE ALLERGY INJECTIONS FOR ALLERGY INJECTION ADMINISTRATION      Patient here for allergy injection today.  Patient was presented with the opportunity to speak with provider and ask questions regarding allergy injections.  Patient was also instructed they need to wait 30 minutes after receiving allergy injections. All risks associated with potential adverse effects have been explained to patient and patient handout was provided following allergy testing.    After consent obtained/verified, allergy injection subcutaneously given in back of arm(s).  Please see below for specific details of site injections, concentration of serum, and volume injected.    VIAL COLOR OF ALL VIALS TODAY IS yellow 1:10. Vial allergy w/v concentration today is: 1:10     ALLERGY INJECTION FROM VIAL A GIVEN left  UPPER ARM IN THE AMOUNT OF 0.10 ML    ALLERGY INJECTION FROM VIAL B GIVEN right upper ARM IN THE AMOUNT OF 0.10  ML        Documentation of vial injection specific to arm(s) noted on Allergy Immunotherapy Administration Form.       Patient waited 30 minutes for observation.Yes  Patient has received information and verbalizes understanding of the potential  health risks associated with allergy injections . Patient understands risks including anaphylaxis and chooses not to wait 30 minutes after administration of allergy injection(s).    Patient tolerated well without adverse reaction while the patient was in the office.    SHOT REACTION TREATMENT INSTRUCTIONS    During the 30 minute wait after an allergy injection the following symptoms should be reported:    Itching other than at the injection site  Hives or swelling other than at the injection site  Redness other than at the injection site  Difficulty breathing  Chest tightness  Difficulty swallowing  Throat tightness    If these symptoms occur,

## 2024-03-21 ENCOUNTER — NURSE ONLY (OUTPATIENT)
Dept: ALLERGY | Age: 59
End: 2024-03-21
Payer: COMMERCIAL

## 2024-03-21 VITALS
DIASTOLIC BLOOD PRESSURE: 90 MMHG | HEART RATE: 83 BPM | RESPIRATION RATE: 18 BRPM | SYSTOLIC BLOOD PRESSURE: 151 MMHG | OXYGEN SATURATION: 97 %

## 2024-03-21 DIAGNOSIS — J30.81 ALLERGIC RHINITIS DUE TO ANIMAL HAIR AND DANDER: ICD-10-CM

## 2024-03-21 DIAGNOSIS — J30.9 CHRONIC ALLERGIC RHINITIS: Primary | ICD-10-CM

## 2024-03-21 DIAGNOSIS — J30.1 SEASONAL ALLERGIC RHINITIS DUE TO POLLEN: ICD-10-CM

## 2024-03-21 PROCEDURE — 95117 IMMUNOTHERAPY INJECTIONS: CPT | Performed by: NURSE PRACTITIONER

## 2024-03-21 NOTE — PROGRESS NOTES
PATIENT HAS THE FOLLOWING DIAGNOSIS SUPPORTING ADMINISTRATION OF ALLERGY INJECTIONS: J30.1Allergic rhinitis due to pollen  AND [id number] MULTIPLE ALLERGY INJECTIONS FOR ALLERGY INJECTION ADMINISTRATION      Patient here for allergy injection today.  Patient was presented with the opportunity to speak with provider and ask questions regarding allergy injections.  Patient was also instructed they need to wait 30 minutes after receiving allergy injections. All risks associated with potential adverse effects have been explained to patient and patient handout was provided following allergy testing.    After consent obtained/verified, allergy injection subcutaneously given in back of arm(s).  Please see below for specific details of site injections, concentration of serum, and volume injected.    VIAL COLOR OF ALL VIALS TODAY IS yellow 1:10. Vial allergy w/v concentration today is: 1:10     ALLERGY INJECTION FROM VIAL A GIVEN right  UPPER ARM IN THE AMOUNT OF 0.15 ML    ALLERGY INJECTION FROM VIAL B GIVEN left upper ARM IN THE AMOUNT OF 0.15  ML          Documentation of vial injection specific to arm(s) noted on Allergy Immunotherapy Administration Form.       Patient waited 30 minutes for observation.No  Patient has received information and verbalizes understanding of the potential  health risks associated with allergy injections . Patient understands risks including anaphylaxis and chooses not to wait 30 minutes after administration of allergy injection(s).    Patient tolerated well without adverse reaction while the patient was in the office.    SHOT REACTION TREATMENT INSTRUCTIONS    During the 30 minute wait after an allergy injection the following symptoms should be reported:    Itching other than at the injection site  Hives or swelling other than at the injection site  Redness other than at the injection site  Difficulty breathing  Chest tightness  Difficulty swallowing  Throat tightness    If these symptoms occur,

## 2024-03-26 ENCOUNTER — NURSE ONLY (OUTPATIENT)
Dept: ALLERGY | Age: 59
End: 2024-03-26
Payer: COMMERCIAL

## 2024-03-26 VITALS
RESPIRATION RATE: 18 BRPM | OXYGEN SATURATION: 98 % | HEART RATE: 74 BPM | DIASTOLIC BLOOD PRESSURE: 96 MMHG | SYSTOLIC BLOOD PRESSURE: 158 MMHG

## 2024-03-26 DIAGNOSIS — J30.81 ALLERGIC RHINITIS DUE TO ANIMAL HAIR AND DANDER: ICD-10-CM

## 2024-03-26 DIAGNOSIS — J30.9 CHRONIC ALLERGIC RHINITIS: Primary | ICD-10-CM

## 2024-03-26 DIAGNOSIS — J30.1 SEASONAL ALLERGIC RHINITIS DUE TO POLLEN: ICD-10-CM

## 2024-03-26 PROCEDURE — 95117 IMMUNOTHERAPY INJECTIONS: CPT | Performed by: NURSE PRACTITIONER

## 2024-03-26 NOTE — PROGRESS NOTES
PATIENT HAS THE FOLLOWING DIAGNOSIS SUPPORTING ADMINISTRATION OF ALLERGY INJECTIONS: J30.1Allergic rhinitis due to pollen  AND [id number] MULTIPLE ALLERGY INJECTIONS FOR ALLERGY INJECTION ADMINISTRATION      Patient here for allergy injection today.  Patient was presented with the opportunity to speak with provider and ask questions regarding allergy injections.  Patient was also instructed they need to wait 30 minutes after receiving allergy injections. All risks associated with potential adverse effects have been explained to patient and patient handout was provided following allergy testing.    After consent obtained/verified, allergy injection subcutaneously given in back of arm(s).  Please see below for specific details of site injections, concentration of serum, and volume injected.    VIAL COLOR OF ALL VIALS TODAY IS yellow 1:10. Vial allergy w/v concentration today is: 1:10     ALLERGY INJECTION FROM VIAL A GIVEN left  UPPER ARM IN THE AMOUNT OF 0.20 ML    ALLERGY INJECTION FROM VIAL B GIVEN right upper ARM IN THE AMOUNT OF 0.20  ML        Documentation of vial injection specific to arm(s) noted on Allergy Immunotherapy Administration Form.       Patient waited 30 minutes for observation.Yes  Patient has received information and verbalizes understanding of the potential  health risks associated with allergy injections . Patient understands risks including anaphylaxis and chooses not to wait 30 minutes after administration of allergy injection(s).    Patient tolerated well without adverse reaction while the patient was in the office.    SHOT REACTION TREATMENT INSTRUCTIONS    During the 30 minute wait after an allergy injection the following symptoms should be reported:    Itching other than at the injection site  Hives or swelling other than at the injection site  Redness other than at the injection site  Difficulty breathing  Chest tightness  Difficulty swallowing  Throat tightness    If these symptoms occur,

## 2024-03-28 ENCOUNTER — NURSE ONLY (OUTPATIENT)
Dept: ALLERGY | Age: 59
End: 2024-03-28
Payer: COMMERCIAL

## 2024-03-28 VITALS
RESPIRATION RATE: 18 BRPM | SYSTOLIC BLOOD PRESSURE: 159 MMHG | HEART RATE: 75 BPM | DIASTOLIC BLOOD PRESSURE: 92 MMHG | OXYGEN SATURATION: 98 %

## 2024-03-28 DIAGNOSIS — J30.9 CHRONIC ALLERGIC RHINITIS: Primary | ICD-10-CM

## 2024-03-28 DIAGNOSIS — J30.1 SEASONAL ALLERGIC RHINITIS DUE TO POLLEN: ICD-10-CM

## 2024-03-28 DIAGNOSIS — J30.81 ALLERGIC RHINITIS DUE TO ANIMAL HAIR AND DANDER: ICD-10-CM

## 2024-03-28 PROCEDURE — 95117 IMMUNOTHERAPY INJECTIONS: CPT | Performed by: NURSE PRACTITIONER

## 2024-03-28 NOTE — PROGRESS NOTES
PATIENT HAS THE FOLLOWING DIAGNOSIS SUPPORTING ADMINISTRATION OF ALLERGY INJECTIONS: J30.1Allergic rhinitis due to pollen  AND [id number] MULTIPLE ALLERGY INJECTIONS FOR ALLERGY INJECTION ADMINISTRATION      Patient here for allergy injection today.  Patient was presented with the opportunity to speak with provider and ask questions regarding allergy injections.  Patient was also instructed they need to wait 30 minutes after receiving allergy injections. All risks associated with potential adverse effects have been explained to patient and patient handout was provided following allergy testing.    After consent obtained/verified, allergy injection subcutaneously given in back of arm(s).  Please see below for specific details of site injections, concentration of serum, and volume injected.    VIAL COLOR OF ALL VIALS TODAY IS yellow 1:10. Vial allergy w/v concentration today is: 1:10     ALLERGY INJECTION FROM VIAL A GIVEN right  UPPER ARM IN THE AMOUNT OF 0.25 ML    ALLERGY INJECTION FROM VIAL B GIVEN left upper ARM IN THE AMOUNT OF 0.25  ML          Documentation of vial injection specific to arm(s) noted on Allergy Immunotherapy Administration Form.       Patient waited 30 minutes for observation.Yes  Patient has received information and verbalizes understanding of the potential  health risks associated with allergy injections . Patient understands risks including anaphylaxis and chooses not to wait 30 minutes after administration of allergy injection(s).    Patient tolerated well without adverse reaction while the patient was in the office.    SHOT REACTION TREATMENT INSTRUCTIONS    During the 30 minute wait after an allergy injection the following symptoms should be reported:    Itching other than at the injection site  Hives or swelling other than at the injection site  Redness other than at the injection site  Difficulty breathing  Chest tightness  Difficulty swallowing  Throat tightness    If these symptoms occur,

## 2024-04-04 ENCOUNTER — NURSE ONLY (OUTPATIENT)
Dept: ALLERGY | Age: 59
End: 2024-04-04
Payer: COMMERCIAL

## 2024-04-04 VITALS
DIASTOLIC BLOOD PRESSURE: 93 MMHG | OXYGEN SATURATION: 97 % | RESPIRATION RATE: 20 BRPM | SYSTOLIC BLOOD PRESSURE: 149 MMHG | HEART RATE: 78 BPM

## 2024-04-04 DIAGNOSIS — J30.9 CHRONIC ALLERGIC RHINITIS: Primary | ICD-10-CM

## 2024-04-04 DIAGNOSIS — J30.1 SEASONAL ALLERGIC RHINITIS DUE TO POLLEN: ICD-10-CM

## 2024-04-04 DIAGNOSIS — J30.81 ALLERGIC RHINITIS DUE TO ANIMAL HAIR AND DANDER: ICD-10-CM

## 2024-04-04 PROCEDURE — 95117 IMMUNOTHERAPY INJECTIONS: CPT | Performed by: NURSE PRACTITIONER

## 2024-04-09 ENCOUNTER — NURSE ONLY (OUTPATIENT)
Dept: ALLERGY | Age: 59
End: 2024-04-09
Payer: COMMERCIAL

## 2024-04-09 VITALS
OXYGEN SATURATION: 95 % | DIASTOLIC BLOOD PRESSURE: 81 MMHG | RESPIRATION RATE: 18 BRPM | SYSTOLIC BLOOD PRESSURE: 140 MMHG | HEART RATE: 83 BPM

## 2024-04-09 DIAGNOSIS — J30.81 ALLERGIC RHINITIS DUE TO ANIMAL HAIR AND DANDER: ICD-10-CM

## 2024-04-09 DIAGNOSIS — J30.9 CHRONIC ALLERGIC RHINITIS: Primary | ICD-10-CM

## 2024-04-09 DIAGNOSIS — J30.1 SEASONAL ALLERGIC RHINITIS DUE TO POLLEN: ICD-10-CM

## 2024-04-09 PROCEDURE — 95117 IMMUNOTHERAPY INJECTIONS: CPT | Performed by: NURSE PRACTITIONER

## 2024-04-09 NOTE — PROGRESS NOTES
PATIENT HAS THE FOLLOWING DIAGNOSIS SUPPORTING ADMINISTRATION OF ALLERGY INJECTIONS: J30.1Allergic rhinitis due to pollen  AND [id number] MULTIPLE ALLERGY INJECTIONS FOR ALLERGY INJECTION ADMINISTRATION      Patient here for allergy injection today.  Patient was presented with the opportunity to speak with provider and ask questions regarding allergy injections.  Patient was also instructed they need to wait 30 minutes after receiving allergy injections. All risks associated with potential adverse effects have been explained to patient and patient handout was provided following allergy testing.    After consent obtained/verified, allergy injection subcutaneously given in back of arm(s).  Please see below for specific details of site injections, concentration of serum, and volume injected.    VIAL COLOR OF ALL VIALS TODAY IS yellow 1:10. Vial allergy w/v concentration today is: 1:10     ALLERGY INJECTION FROM VIAL A GIVEN right  UPPER ARM IN THE AMOUNT OF 0.35 ML    ALLERGY INJECTION FROM VIAL B GIVEN left upper ARM IN THE AMOUNT OF 0.10  ML          Documentation of vial injection specific to arm(s) noted on Allergy Immunotherapy Administration Form.       Patient waited 30 minutes for observation.No  Patient has received information and verbalizes understanding of the potential  health risks associated with allergy injections . Patient understands risks including anaphylaxis and chooses not to wait 30 minutes after administration of allergy injection(s).    Patient tolerated well without adverse reaction while the patient was in the office.    SHOT REACTION TREATMENT INSTRUCTIONS    During the 30 minute wait after an allergy injection the following symptoms should be reported:    Itching other than at the injection site  Hives or swelling other than at the injection site  Redness other than at the injection site  Difficulty breathing  Chest tightness  Difficulty swallowing  Throat tightness    If these symptoms occur,

## 2024-04-11 ENCOUNTER — NURSE ONLY (OUTPATIENT)
Dept: ALLERGY | Age: 59
End: 2024-04-11
Payer: COMMERCIAL

## 2024-04-11 VITALS
DIASTOLIC BLOOD PRESSURE: 78 MMHG | OXYGEN SATURATION: 98 % | HEART RATE: 79 BPM | RESPIRATION RATE: 18 BRPM | SYSTOLIC BLOOD PRESSURE: 131 MMHG

## 2024-04-11 DIAGNOSIS — J30.1 SEASONAL ALLERGIC RHINITIS DUE TO POLLEN: ICD-10-CM

## 2024-04-11 DIAGNOSIS — J30.81 ALLERGIC RHINITIS DUE TO ANIMAL HAIR AND DANDER: ICD-10-CM

## 2024-04-11 DIAGNOSIS — J30.9 CHRONIC ALLERGIC RHINITIS: Primary | ICD-10-CM

## 2024-04-11 PROCEDURE — 95117 IMMUNOTHERAPY INJECTIONS: CPT | Performed by: NURSE PRACTITIONER

## 2024-04-11 NOTE — PROGRESS NOTES
PATIENT HAS THE FOLLOWING DIAGNOSIS SUPPORTING ADMINISTRATION OF ALLERGY INJECTIONS: J30.1Allergic rhinitis due to pollen  AND [id number] MULTIPLE ALLERGY INJECTIONS FOR ALLERGY INJECTION ADMINISTRATION      Patient here for allergy injection today.  Patient was presented with the opportunity to speak with provider and ask questions regarding allergy injections.  Patient was also instructed they need to wait 30 minutes after receiving allergy injections. All risks associated with potential adverse effects have been explained to patient and patient handout was provided following allergy testing.    After consent obtained/verified, allergy injection subcutaneously given in back of arm(s).  Please see below for specific details of site injections, concentration of serum, and volume injected.    VIAL COLOR OF ALL VIALS TODAY IS yellow 1:10. Vial allergy w/v concentration today is: 1:10     ALLERGY INJECTION FROM VIAL A GIVEN left  UPPER ARM IN THE AMOUNT OF 0.40 ML    ALLERGY INJECTION FROM VIAL B GIVEN right upper ARM IN THE AMOUNT OF 0.40  ML        Documentation of vial injection specific to arm(s) noted on Allergy Immunotherapy Administration Form.       Patient waited 15 minutes for observation.yes  Patient has received information and verbalizes understanding of the potential  health risks associated with allergy injections . Patient understands risks including anaphylaxis and chooses not to wait 30 minutes after administration of allergy injection(s).    Patient tolerated well without adverse reaction while the patient was in the office.    SHOT REACTION TREATMENT INSTRUCTIONS    During the 30 minute wait after an allergy injection the following symptoms should be reported:    Itching other than at the injection site  Hives or swelling other than at the injection site  Redness other than at the injection site  Difficulty breathing  Chest tightness  Difficulty swallowing  Throat tightness    If these symptoms occur,

## 2024-04-16 ENCOUNTER — NURSE ONLY (OUTPATIENT)
Dept: ALLERGY | Age: 59
End: 2024-04-16
Payer: COMMERCIAL

## 2024-04-16 VITALS — SYSTOLIC BLOOD PRESSURE: 151 MMHG | DIASTOLIC BLOOD PRESSURE: 94 MMHG | HEART RATE: 74 BPM

## 2024-04-16 DIAGNOSIS — J30.81 ALLERGIC RHINITIS DUE TO ANIMAL HAIR AND DANDER: ICD-10-CM

## 2024-04-16 DIAGNOSIS — J30.9 CHRONIC ALLERGIC RHINITIS: Primary | ICD-10-CM

## 2024-04-16 DIAGNOSIS — J30.1 SEASONAL ALLERGIC RHINITIS DUE TO POLLEN: ICD-10-CM

## 2024-04-16 PROCEDURE — 95117 IMMUNOTHERAPY INJECTIONS: CPT | Performed by: NURSE PRACTITIONER

## 2024-04-16 NOTE — PROGRESS NOTES
PATIENT HAS THE FOLLOWING DIAGNOSIS SUPPORTING ADMINISTRATION OF ALLERGY INJECTIONS: J30.1Allergic rhinitis due to pollen  AND [id number] MULTIPLE ALLERGY INJECTIONS FOR ALLERGY INJECTION ADMINISTRATION      Patient here for allergy injection today.  Patient was presented with the opportunity to speak with provider and ask questions regarding allergy injections.  Patient was also instructed they need to wait 30 minutes after receiving allergy injections. All risks associated with potential adverse effects have been explained to patient and patient handout was provided following allergy testing.    After consent obtained/verified, allergy injection subcutaneously given in back of arm(s).  Please see below for specific details of site injections, concentration of serum, and volume injected.    VIAL COLOR OF ALL VIALS TODAY IS yellow 1:10. Vial allergy w/v concentration today is: 1:10     ALLERGY INJECTION FROM VIAL A GIVEN left  UPPER ARM IN THE AMOUNT OF 0.45 ML    ALLERGY INJECTION FROM VIAL B GIVEN right  ARM IN THE AMOUNT OF 0.45  ML      Documentation of vial injection specific to arm(s) noted on Allergy Immunotherapy Administration Form.       Patient waited 30 minutes for observation.Yes  Patient has received information and verbalizes understanding of the potential  health risks associated with allergy injections . Patient understands risks including anaphylaxis and chooses not to wait 30 minutes after administration of allergy injection(s).    Patient tolerated well without adverse reaction while the patient was in the office.    SHOT REACTION TREATMENT INSTRUCTIONS    During the 30 minute wait after an allergy injection the following symptoms should be reported:    Itching other than at the injection site  Hives or swelling other than at the injection site  Redness other than at the injection site  Difficulty breathing  Chest tightness  Difficulty swallowing  Throat tightness    If these symptoms occur, NOTIFY

## 2024-04-24 ENCOUNTER — NURSE ONLY (OUTPATIENT)
Dept: ALLERGY | Age: 59
End: 2024-04-24
Payer: COMMERCIAL

## 2024-04-24 VITALS
OXYGEN SATURATION: 96 % | SYSTOLIC BLOOD PRESSURE: 149 MMHG | DIASTOLIC BLOOD PRESSURE: 89 MMHG | HEART RATE: 82 BPM | RESPIRATION RATE: 18 BRPM

## 2024-04-24 DIAGNOSIS — J30.9 CHRONIC ALLERGIC RHINITIS: Primary | ICD-10-CM

## 2024-04-24 DIAGNOSIS — J30.1 SEASONAL ALLERGIC RHINITIS DUE TO POLLEN: ICD-10-CM

## 2024-04-24 DIAGNOSIS — J30.81 ALLERGIC RHINITIS DUE TO ANIMAL HAIR AND DANDER: ICD-10-CM

## 2024-04-24 PROCEDURE — 95117 IMMUNOTHERAPY INJECTIONS: CPT | Performed by: NURSE PRACTITIONER

## 2024-04-24 NOTE — PROGRESS NOTES
PATIENT HAS THE FOLLOWING DIAGNOSIS SUPPORTING ADMINISTRATION OF ALLERGY INJECTIONS: J30.1Allergic rhinitis due to pollen  AND [id number] MULTIPLE ALLERGY INJECTIONS FOR ALLERGY INJECTION ADMINISTRATION      Patient here for allergy injection today.  Patient was presented with the opportunity to speak with provider and ask questions regarding allergy injections.  Patient was also instructed they need to wait 30 minutes after receiving allergy injections. All risks associated with potential adverse effects have been explained to patient and patient handout was provided following allergy testing.    After consent obtained/verified, allergy injection subcutaneously given in back of arm(s).  Please see below for specific details of site injections, concentration of serum, and volume injected.    VIAL COLOR OF ALL VIALS TODAY IS yellow 1:10. Vial allergy w/v concentration today is: 1:10     ALLERGY INJECTION FROM VIAL A GIVEN left  UPPER ARM IN THE AMOUNT OF 0.50 ML    ALLERGY INJECTION FROM VIAL B GIVEN right upper ARM IN THE AMOUNT OF 0.50 ml  ML          Documentation of vial injection specific to arm(s) noted on Allergy Immunotherapy Administration Form.       Patient waited 30 minutes for observation.Yes  Patient has received information and verbalizes understanding of the potential  health risks associated with allergy injections . Patient understands risks including anaphylaxis and chooses not to wait 30 minutes after administration of allergy injection(s).    Patient tolerated well without adverse reaction while the patient was in the office.    SHOT REACTION TREATMENT INSTRUCTIONS    During the 30 minute wait after an allergy injection the following symptoms should be reported:    Itching other than at the injection site  Hives or swelling other than at the injection site  Redness other than at the injection site  Difficulty breathing  Chest tightness  Difficulty swallowing  Throat tightness    If these symptoms

## 2024-05-01 ENCOUNTER — NURSE ONLY (OUTPATIENT)
Dept: ALLERGY | Age: 59
End: 2024-05-01
Payer: COMMERCIAL

## 2024-05-01 VITALS
OXYGEN SATURATION: 98 % | RESPIRATION RATE: 18 BRPM | HEART RATE: 76 BPM | SYSTOLIC BLOOD PRESSURE: 125 MMHG | DIASTOLIC BLOOD PRESSURE: 83 MMHG

## 2024-05-01 DIAGNOSIS — J30.81 ALLERGIC RHINITIS DUE TO ANIMAL HAIR AND DANDER: ICD-10-CM

## 2024-05-01 DIAGNOSIS — J30.9 CHRONIC ALLERGIC RHINITIS: Primary | ICD-10-CM

## 2024-05-01 DIAGNOSIS — J30.1 SEASONAL ALLERGIC RHINITIS DUE TO POLLEN: ICD-10-CM

## 2024-05-01 PROCEDURE — 95117 IMMUNOTHERAPY INJECTIONS: CPT | Performed by: NURSE PRACTITIONER

## 2024-05-01 NOTE — PROGRESS NOTES
PATIENT HAS THE FOLLOWING DIAGNOSIS SUPPORTING ADMINISTRATION OF ALLERGY INJECTIONS: J30.1Allergic rhinitis due to pollen  AND [id number] MULTIPLE ALLERGY INJECTIONS FOR ALLERGY INJECTION ADMINISTRATION      Patient here for allergy injection today.  Patient was presented with the opportunity to speak with provider and ask questions regarding allergy injections.  Patient was also instructed they need to wait 30 minutes after receiving allergy injections. All risks associated with potential adverse effects have been explained to patient and patient handout was provided following allergy testing.    After consent obtained/verified, allergy injection subcutaneously given in back of arm(s).  Please see below for specific details of site injections, concentration of serum, and volume injected.    VIAL COLOR OF ALL VIALS TODAY IS red 1:1. Vial allergy w/v concentration today is: 1:1     ALLERGY INJECTION FROM VIAL A GIVEN right  UPPER ARM IN THE AMOUNT OF 0.05 ML    ALLERGY INJECTION FROM VIAL B GIVEN left upper ARM IN THE AMOUNT OF 0.05  ML          Documentation of vial injection specific to arm(s) noted on Allergy Immunotherapy Administration Form.       Patient waited 15 minutes for observation.Yes  Patient has received information and verbalizes understanding of the potential  health risks associated with allergy injections . Patient understands risks including anaphylaxis and chooses not to wait 30 minutes after administration of allergy injection(s).    Patient tolerated well without adverse reaction while the patient was in the office.    SHOT REACTION TREATMENT INSTRUCTIONS    During the 30 minute wait after an allergy injection the following symptoms should be reported:    Itching other than at the injection site  Hives or swelling other than at the injection site  Redness other than at the injection site  Difficulty breathing  Chest tightness  Difficulty swallowing  Throat tightness    If these symptoms occur,

## 2024-05-06 ENCOUNTER — NURSE ONLY (OUTPATIENT)
Dept: ALLERGY | Age: 59
End: 2024-05-06
Payer: COMMERCIAL

## 2024-05-06 VITALS
DIASTOLIC BLOOD PRESSURE: 84 MMHG | HEART RATE: 69 BPM | RESPIRATION RATE: 18 BRPM | OXYGEN SATURATION: 98 % | SYSTOLIC BLOOD PRESSURE: 142 MMHG

## 2024-05-06 DIAGNOSIS — J30.9 CHRONIC ALLERGIC RHINITIS: Primary | ICD-10-CM

## 2024-05-06 DIAGNOSIS — J30.1 SEASONAL ALLERGIC RHINITIS DUE TO POLLEN: ICD-10-CM

## 2024-05-06 DIAGNOSIS — J30.81 ALLERGIC RHINITIS DUE TO ANIMAL HAIR AND DANDER: ICD-10-CM

## 2024-05-06 PROCEDURE — 95117 IMMUNOTHERAPY INJECTIONS: CPT | Performed by: NURSE PRACTITIONER

## 2024-05-06 NOTE — PROGRESS NOTES
PATIENT HAS THE FOLLOWING DIAGNOSIS SUPPORTING ADMINISTRATION OF ALLERGY INJECTIONS: J30.1Allergic rhinitis due to pollen  AND 60833 MULTIPLE ALLERGY INJECTIONS FOR ALLERGY INJECTION ADMINISTRATION      Patient here for allergy injection today.  Patient was presented with the opportunity to speak with provider and ask questions regarding allergy injections.  Patient was also instructed they need to wait 30 minutes after receiving allergy injections. All risks associated with potential adverse effects have been explained to patient and patient handout was provided following allergy testing.    After consent obtained/verified, allergy injection subcutaneously given in back of arm(s).  Please see below for specific details of site injections, concentration of serum, and volume injected.    VIAL COLOR OF ALL VIALS TODAY IS red 1:1. Vial allergy w/v concentration today is: 1:1     ALLERGY INJECTION FROM VIAL A GIVEN left  UPPER ARM IN THE AMOUNT OF 0.10 ML    ALLERGY INJECTION FROM VIAL B GIVEN right upper ARM IN THE AMOUNT OF 0.10  ML          Documentation of vial injection specific to arm(s) noted on Allergy Immunotherapy Administration Form.       Patient waited 15 minutes for observation.Yes  Patient has received information and verbalizes understanding of the potential  health risks associated with allergy injections . Patient understands risks including anaphylaxis and chooses not to wait 30 minutes after administration of allergy injection(s).    Patient tolerated well without adverse reaction while the patient was in the office.    SHOT REACTION TREATMENT INSTRUCTIONS    During the 30 minute wait after an allergy injection the following symptoms should be reported:    Itching other than at the injection site  Hives or swelling other than at the injection site  Redness other than at the injection site  Difficulty breathing  Chest tightness  Difficulty swallowing  Throat tightness    If these symptoms occur,

## 2024-05-14 ENCOUNTER — NURSE ONLY (OUTPATIENT)
Dept: ALLERGY | Age: 59
End: 2024-05-14
Payer: COMMERCIAL

## 2024-05-14 VITALS
OXYGEN SATURATION: 98 % | RESPIRATION RATE: 18 BRPM | DIASTOLIC BLOOD PRESSURE: 82 MMHG | HEART RATE: 78 BPM | SYSTOLIC BLOOD PRESSURE: 139 MMHG

## 2024-05-14 DIAGNOSIS — J30.9 CHRONIC ALLERGIC RHINITIS: Primary | ICD-10-CM

## 2024-05-14 DIAGNOSIS — J30.81 ALLERGIC RHINITIS DUE TO ANIMAL HAIR AND DANDER: ICD-10-CM

## 2024-05-14 DIAGNOSIS — J30.1 SEASONAL ALLERGIC RHINITIS DUE TO POLLEN: ICD-10-CM

## 2024-05-14 PROCEDURE — 95117 IMMUNOTHERAPY INJECTIONS: CPT | Performed by: NURSE PRACTITIONER

## 2024-05-14 NOTE — PROGRESS NOTES
PATIENT HAS THE FOLLOWING DIAGNOSIS SUPPORTING ADMINISTRATION OF ALLERGY INJECTIONS: J30.1Allergic rhinitis due to pollen  AND 85616 MULTIPLE ALLERGY INJECTIONS FOR ALLERGY INJECTION ADMINISTRATION      Patient here for allergy injection today.  Patient was presented with the opportunity to speak with provider and ask questions regarding allergy injections.  Patient was also instructed they need to wait 30 minutes after receiving allergy injections. All risks associated with potential adverse effects have been explained to patient and patient handout was provided following allergy testing.    After consent obtained/verified, allergy injection subcutaneously given in back of arm(s).  Please see below for specific details of site injections, concentration of serum, and volume injected.    VIAL COLOR OF ALL VIALS TODAY IS red 1:1. Vial allergy w/v concentration today is: 1:1     ALLERGY INJECTION FROM VIAL A GIVEN right  UPPER ARM IN THE AMOUNT OF 0.15 ML    ALLERGY INJECTION FROM VIAL B GIVEN left upper ARM IN THE AMOUNT OF 0.15  ML          Documentation of vial injection specific to arm(s) noted on Allergy Immunotherapy Administration Form.       Patient waited 15 minutes for observation.Yes  Patient has received information and verbalizes understanding of the potential  health risks associated with allergy injections . Patient understands risks including anaphylaxis and chooses not to wait 30 minutes after administration of allergy injection(s).    Patient tolerated well without adverse reaction while the patient was in the office.    SHOT REACTION TREATMENT INSTRUCTIONS    During the 30 minute wait after an allergy injection the following symptoms should be reported:    Itching other than at the injection site  Hives or swelling other than at the injection site  Redness other than at the injection site  Difficulty breathing  Chest tightness  Difficulty swallowing  Throat tightness    If these symptoms occur,

## 2024-05-21 ENCOUNTER — NURSE ONLY (OUTPATIENT)
Dept: ALLERGY | Age: 59
End: 2024-05-21
Payer: COMMERCIAL

## 2024-05-21 VITALS
DIASTOLIC BLOOD PRESSURE: 90 MMHG | SYSTOLIC BLOOD PRESSURE: 145 MMHG | OXYGEN SATURATION: 96 % | HEART RATE: 77 BPM | RESPIRATION RATE: 20 BRPM

## 2024-05-21 DIAGNOSIS — J30.9 CHRONIC ALLERGIC RHINITIS: Primary | ICD-10-CM

## 2024-05-21 DIAGNOSIS — J30.81 ALLERGIC RHINITIS DUE TO ANIMAL HAIR AND DANDER: ICD-10-CM

## 2024-05-21 DIAGNOSIS — J30.1 SEASONAL ALLERGIC RHINITIS DUE TO POLLEN: ICD-10-CM

## 2024-05-21 PROCEDURE — 95117 IMMUNOTHERAPY INJECTIONS: CPT | Performed by: NURSE PRACTITIONER

## 2024-05-21 NOTE — PROGRESS NOTES
PATIENT HAS THE FOLLOWING DIAGNOSIS SUPPORTING ADMINISTRATION OF ALLERGY INJECTIONS: J30.1Allergic rhinitis due to pollen  AND 11980 MULTIPLE ALLERGY INJECTIONS FOR ALLERGY INJECTION ADMINISTRATION      Patient here for allergy injection today.  Patient was presented with the opportunity to speak with provider and ask questions regarding allergy injections.  Patient was also instructed they need to wait 30 minutes after receiving allergy injections. All risks associated with potential adverse effects have been explained to patient and patient handout was provided following allergy testing.    After consent obtained/verified, allergy injection subcutaneously given in back of arm(s).  Please see below for specific details of site injections, concentration of serum, and volume injected.    VIAL COLOR OF ALL VIALS TODAY IS red 1:1. Vial allergy w/v concentration today is: 1:1     ALLERGY INJECTION FROM VIAL A GIVEN left  UPPER ARM IN THE AMOUNT OF 0.20 ML    ALLERGY INJECTION FROM VIAL B GIVEN right upper ARM IN THE AMOUNT OF 0.20  ML          Documentation of vial injection specific to arm(s) noted on Allergy Immunotherapy Administration Form.       Patient waited 15 minutes for observation.Yes  Patient has received information and verbalizes understanding of the potential  health risks associated with allergy injections . Patient understands risks including anaphylaxis and chooses not to wait 30 minutes after administration of allergy injection(s).    Patient tolerated well without adverse reaction while the patient was in the office.    SHOT REACTION TREATMENT INSTRUCTIONS    During the 30 minute wait after an allergy injection the following symptoms should be reported:    Itching other than at the injection site  Hives or swelling other than at the injection site  Redness other than at the injection site  Difficulty breathing  Chest tightness  Difficulty swallowing  Throat tightness    If these symptoms occur,

## 2024-05-23 ENCOUNTER — NURSE ONLY (OUTPATIENT)
Dept: ALLERGY | Age: 59
End: 2024-05-23
Payer: COMMERCIAL

## 2024-05-23 ENCOUNTER — OFFICE VISIT (OUTPATIENT)
Dept: ALLERGY | Age: 59
End: 2024-05-23
Payer: COMMERCIAL

## 2024-05-23 VITALS
RESPIRATION RATE: 20 BRPM | HEART RATE: 75 BPM | DIASTOLIC BLOOD PRESSURE: 78 MMHG | OXYGEN SATURATION: 98 % | SYSTOLIC BLOOD PRESSURE: 144 MMHG

## 2024-05-23 VITALS
RESPIRATION RATE: 20 BRPM | DIASTOLIC BLOOD PRESSURE: 78 MMHG | HEART RATE: 75 BPM | SYSTOLIC BLOOD PRESSURE: 144 MMHG | OXYGEN SATURATION: 98 %

## 2024-05-23 DIAGNOSIS — J32.4 CHRONIC PANSINUSITIS: Primary | ICD-10-CM

## 2024-05-23 DIAGNOSIS — J30.89 NON-SEASONAL ALLERGIC RHINITIS, UNSPECIFIED TRIGGER: ICD-10-CM

## 2024-05-23 DIAGNOSIS — J30.81 ALLERGIC RHINITIS DUE TO ANIMAL HAIR AND DANDER: ICD-10-CM

## 2024-05-23 DIAGNOSIS — J30.9 CHRONIC ALLERGIC RHINITIS: Primary | ICD-10-CM

## 2024-05-23 DIAGNOSIS — J30.1 SEASONAL ALLERGIC RHINITIS DUE TO POLLEN: ICD-10-CM

## 2024-05-23 PROCEDURE — 99214 OFFICE O/P EST MOD 30 MIN: CPT | Performed by: NURSE PRACTITIONER

## 2024-05-23 PROCEDURE — 95117 IMMUNOTHERAPY INJECTIONS: CPT | Performed by: NURSE PRACTITIONER

## 2024-05-23 RX ORDER — AZELASTINE 1 MG/ML
1 SPRAY, METERED NASAL 2 TIMES DAILY PRN
Qty: 1 EACH | Refills: 5 | Status: SHIPPED | OUTPATIENT
Start: 2024-05-23

## 2024-05-23 ASSESSMENT — ENCOUNTER SYMPTOMS: RHINORRHEA: 1

## 2024-05-23 NOTE — PROGRESS NOTES
PATIENT HAS THE FOLLOWING DIAGNOSIS SUPPORTING ADMINISTRATION OF ALLERGY INJECTIONS: J30.1Allergic rhinitis due to pollen  AND 01601 MULTIPLE ALLERGY INJECTIONS FOR ALLERGY INJECTION ADMINISTRATION      Patient here for allergy injection today.  Patient was presented with the opportunity to speak with provider and ask questions regarding allergy injections.  Patient was also instructed they need to wait 30 minutes after receiving allergy injections. All risks associated with potential adverse effects have been explained to patient and patient handout was provided following allergy testing.    After consent obtained/verified, allergy injection subcutaneously given in back of arm(s).  Please see below for specific details of site injections, concentration of serum, and volume injected.    VIAL COLOR OF ALL VIALS TODAY IS red 1:1. Vial allergy w/v concentration today is: 1:1     ALLERGY INJECTION FROM VIAL A GIVEN right  UPPER ARM IN THE AMOUNT OF 0.25 ML    ALLERGY INJECTION FROM VIAL B GIVEN left upper ARM IN THE AMOUNT OF 0.25  ML      Documentation of vial injection specific to arm(s) noted on Allergy Immunotherapy Administration Form.       Patient waited 30 minutes for observation.Yes  Patient has received information and verbalizes understanding of the potential  health risks associated with allergy injections . Patient understands risks including anaphylaxis and chooses not to wait 30 minutes after administration of allergy injection(s).    Patient tolerated well without adverse reaction while the patient was in the office.    SHOT REACTION TREATMENT INSTRUCTIONS    During the 30 minute wait after an allergy injection the following symptoms should be reported:    Itching other than at the injection site  Hives or swelling other than at the injection site  Redness other than at the injection site  Difficulty breathing  Chest tightness  Difficulty swallowing  Throat tightness    If these symptoms occur, NOTIFY

## 2024-05-23 NOTE — PROGRESS NOTES
[email or web address]    Allergy & Asthma   [address] [place] [address]  Ph:   [phone]  Fax:[phone]    Provider:  Dr. Joyce Sykes    Follow Up         1. Chronic pansinusitis  - CT SINUS WO CONTRAST; Future       CHIEF COMPLAINT:   Chief Complaint   Patient presents with    Follow-up     6 month f/u to see how allergy injections are going       HISTORY OF PRESENT ILLNESS: ESTABLISHED PATIENT HERE FOR EVALUATION       Placido Bravo is a 58 y.o. male is here for immunotherapy follow-up. Patient has been in red color vial  for several month(s).  Patient currently receiving allergy injections every  1 WEEK.  Immunotherapy treatment has not been changed since starting date. There have been  no local reactions to immunotherapy injections. There have been no systemic reactions to IT. On immunotherapy, patient has had a 75%  reduction in symptoms. Since beginning immunotherapy no new allergy symptoms have developed.  He does not suspect any new allergen sensitization.  During the last year patient denies started Beta-blocker medication.  He would like to continue immunotherapy.  Patient has found immunotherapy to be very beneficial in controlling symptoms.  Patient is continuing cetirizine while on allergy shots. Patient denies N/V/D.  Patient states he does not know if the allergy shots are helping him or not.  I did discuss the continuation of therapy or not.  Patient wishes to continue at this time.  I did ask the patient that he could identify any factors such as mowing the grass make his allergies worse.  He did not elaborate.             Review of Systems:  Review of Systems   HENT:  Positive for rhinorrhea. Negative for congestion.    Allergic/Immunologic: Positive for environmental allergies.   All other systems reviewed and are negative.      Past MedicalHistory:    Past Medical History:   Diagnosis Date    Acute pancreatitis     september 2022    Anxiety     GERD (gastroesophageal reflux disease)

## 2024-05-23 NOTE — PATIENT INSTRUCTIONS
5/23/2024   ENRIQUE Batista CNP   [facility]  [address] [place] [address]  Dept: [phone]  Dept Fax: [phone]  Loc: [phone]     Dear Placido,  Thank you for your recent visit. We are committed to providing amazing patient care, and would like to make sure we were successful in providing you a great experience at our office. In the coming days, you may receive a survey via mail or email about your experience with my office. We ask that you please take a couple of minutes to complete and return it. We use our patients’ feedback to make improvements within the office that will make the experience even better for all of our patients.  We appreciate your time in helping us be the best with can!!!    Thank you, and be well!  ENRIQUE Batista CNP

## 2024-05-28 ENCOUNTER — NURSE ONLY (OUTPATIENT)
Dept: ALLERGY | Age: 59
End: 2024-05-28
Payer: COMMERCIAL

## 2024-05-28 ENCOUNTER — NURSE ONLY (OUTPATIENT)
Dept: LAB | Age: 59
End: 2024-05-28

## 2024-05-28 VITALS
SYSTOLIC BLOOD PRESSURE: 153 MMHG | RESPIRATION RATE: 18 BRPM | DIASTOLIC BLOOD PRESSURE: 88 MMHG | HEART RATE: 70 BPM | OXYGEN SATURATION: 98 %

## 2024-05-28 DIAGNOSIS — J30.81 ALLERGIC RHINITIS DUE TO ANIMAL HAIR AND DANDER: ICD-10-CM

## 2024-05-28 DIAGNOSIS — J32.4 CHRONIC PANSINUSITIS: ICD-10-CM

## 2024-05-28 DIAGNOSIS — J30.1 SEASONAL ALLERGIC RHINITIS DUE TO POLLEN: ICD-10-CM

## 2024-05-28 DIAGNOSIS — J30.89 NON-SEASONAL ALLERGIC RHINITIS, UNSPECIFIED TRIGGER: ICD-10-CM

## 2024-05-28 DIAGNOSIS — J30.9 CHRONIC ALLERGIC RHINITIS: Primary | ICD-10-CM

## 2024-05-28 LAB
25(OH)D3 SERPL-MCNC: 60 NG/ML (ref 30–100)
BASOPHILS ABSOLUTE: 0 THOU/MM3 (ref 0–0.1)
BASOPHILS NFR BLD AUTO: 0.7 %
BILIRUB UR QL STRIP.AUTO: NEGATIVE
CHARACTER UR: CLEAR
COLOR: YELLOW
DEPRECATED RDW RBC AUTO: 42 FL (ref 35–45)
EOSINOPHIL NFR BLD AUTO: 3.3 %
EOSINOPHILS ABSOLUTE: 0.2 THOU/MM3 (ref 0–0.4)
ERYTHROCYTE [DISTWIDTH] IN BLOOD BY AUTOMATED COUNT: 13.6 % (ref 11.5–14.5)
ERYTHROCYTE [SEDIMENTATION RATE] IN BLOOD BY WESTERGREN METHOD: 6 MM/HR (ref 0–10)
GLUCOSE UR QL STRIP.AUTO: NEGATIVE MG/DL
HCT VFR BLD AUTO: 43.1 % (ref 42–52)
HGB BLD-MCNC: 14.3 GM/DL (ref 14–18)
HGB UR QL STRIP.AUTO: NEGATIVE
IMM GRANULOCYTES # BLD AUTO: 0.05 THOU/MM3 (ref 0–0.07)
IMM GRANULOCYTES NFR BLD AUTO: 0.9 %
KETONES UR QL STRIP.AUTO: NEGATIVE
LYMPHOCYTES ABSOLUTE: 1.6 THOU/MM3 (ref 1–4.8)
LYMPHOCYTES NFR BLD AUTO: 28.8 %
MCH RBC QN AUTO: 28.7 PG (ref 26–33)
MCHC RBC AUTO-ENTMCNC: 33.2 GM/DL (ref 32.2–35.5)
MCV RBC AUTO: 86.4 FL (ref 80–94)
MONOCYTES ABSOLUTE: 0.4 THOU/MM3 (ref 0.4–1.3)
MONOCYTES NFR BLD AUTO: 6.5 %
NEUTROPHILS ABSOLUTE: 3.3 THOU/MM3 (ref 1.8–7.7)
NEUTROPHILS NFR BLD AUTO: 59.8 %
NITRITE UR QL STRIP: NEGATIVE
NRBC BLD AUTO-RTO: 0 /100 WBC
PH UR STRIP.AUTO: 7.5 [PH] (ref 5–9)
PLATELET # BLD AUTO: 234 THOU/MM3 (ref 130–400)
PMV BLD AUTO: 11.3 FL (ref 9.4–12.4)
PROT UR STRIP.AUTO-MCNC: NEGATIVE MG/DL
RBC # BLD AUTO: 4.99 MILL/MM3 (ref 4.7–6.1)
SP GR UR REFRACT.AUTO: 1.01 (ref 1–1.03)
UROBILINOGEN, URINE: 0.2 EU/DL (ref 0–1)
WBC # BLD AUTO: 5.5 THOU/MM3 (ref 4.8–10.8)
WBC #/AREA URNS HPF: NEGATIVE /[HPF]

## 2024-05-28 PROCEDURE — 95117 IMMUNOTHERAPY INJECTIONS: CPT | Performed by: NURSE PRACTITIONER

## 2024-05-28 NOTE — PROGRESS NOTES
PATIENT HAS THE FOLLOWING DIAGNOSIS SUPPORTING ADMINISTRATION OF ALLERGY INJECTIONS: J30.1Allergic rhinitis due to pollen  AND 75726 MULTIPLE ALLERGY INJECTIONS FOR ALLERGY INJECTION ADMINISTRATION      Patient here for allergy injection today.  Patient was presented with the opportunity to speak with provider and ask questions regarding allergy injections.  Patient was also instructed they need to wait 30 minutes after receiving allergy injections. All risks associated with potential adverse effects have been explained to patient and patient handout was provided following allergy testing.    After consent obtained/verified, allergy injection subcutaneously given in back of arm(s).  Please see below for specific details of site injections, concentration of serum, and volume injected.    VIAL COLOR OF ALL VIALS TODAY IS red 1:1. Vial allergy w/v concentration today is: 1:1     ALLERGY INJECTION FROM VIAL A GIVEN left  UPPER ARM IN THE AMOUNT OF 0.30 ML    ALLERGY INJECTION FROM VIAL B GIVEN right upper ARM IN THE AMOUNT OF 0.30  ML        Documentation of vial injection specific to arm(s) noted on Allergy Immunotherapy Administration Form.       Patient waited 15 minutes for observation.yes  Patient has received information and verbalizes understanding of the potential  health risks associated with allergy injections . Patient understands risks including anaphylaxis and chooses not to wait 30 minutes after administration of allergy injection(s).    Patient tolerated well without adverse reaction while the patient was in the office.    SHOT REACTION TREATMENT INSTRUCTIONS    During the 30 minute wait after an allergy injection the following symptoms should be reported:    Itching other than at the injection site  Hives or swelling other than at the injection site  Redness other than at the injection site  Difficulty breathing  Chest tightness  Difficulty swallowing  Throat tightness    If these symptoms occur, NOTIFY

## 2024-05-29 LAB
C3C SERPL-MCNC: 141 MG/DL (ref 90–180)
C4 SERPL-MCNC: 24 MG/DL (ref 10–40)
IGE SERPL-ACNC: 92 IU/ML (ref 0–100)
IGM SERPL-MCNC: 26 MG/DL (ref 40–230)

## 2024-05-30 LAB
CH50 SERPL-ACNC: 74.5 U/ML (ref 38.7–89.9)
CYCLIC CITRULLINATED PEPTIDE ANTIBODY IGG: 1.1 U/ML (ref 0–7)
HISTONE IGG SER IA-ACNC: NORMAL
MYELOPEROXIDASE AB SER-ACNC: 0 AU/ML (ref 0–19)
NUCLEAR IGG SER QL IA: DETECTED
PROTEINASE3 AB SER-ACNC: 0 AU/ML (ref 0–19)
TRYPTASE SERPL-MCNC: NORMAL NG/ML

## 2024-05-31 LAB
ALLERGEN BERMUDA GRASS IGE: < 0.1 KU/L (ref 0–0.34)
ALLERGEN BIRCH IGE: < 0.1 KU/L (ref 0–0.34)
ALLERGEN DOG DANDER IGE: < 0.1 KU/L (ref 0–0.34)
ALLERGEN GERMAN COCKROACH IGE: 0.12 KU/L (ref 0–0.34)
ALLERGEN HORMODENDRUM IGE: < 0.1 KUL/L (ref 0–0.34)
ALLERGEN MOUSE EPITHELIA IGE: < 0.1 KU/L (ref 0–0.34)
ALLERGEN OAK TREE IGE: < 0.1 KU/L (ref 0–0.34)
ALLERGEN PECAN TREE IGE: < 0.1 KU/L (ref 0–0.34)
ALLERGEN PIGWEED ROUGH IGE: < 0.1 KU/L (ref 0–0.34)
ALLERGEN SHEEP SORREL (W18) IGE: < 0.1 KU/L (ref 0–0.34)
ALLERGEN TREE SYCAMORE: < 0.1 KU/L (ref 0–0.34)
ALLERGEN WALNUT TREE IGE: < 0.1 KU/L (ref 0–0.34)
ALLERGEN WHITE MULBERRY TREE, IGE: < 0.1 KU/L (ref 0–0.34)
ALLERGEN, TREE, WHITE ASH IGE: < 0.1 KU/L (ref 0–0.34)
ALTERNARIA ALTERNATA: < 0.1 KU/L (ref 0–0.34)
ANA PAT SER IF-IMP: NORMAL
ASPERGILLUS FUMIGATUS: < 0.1 KU/L (ref 0–0.34)
CAT DANDER ANTIBODY: < 0.1 KU/L (ref 0–0.34)
COTTONWOOD TREE: 0.14 KU/L (ref 0–0.34)
D. FARINAE: < 0.1 KU/L (ref 0–0.34)
D. PTERONYSSINUS: < 0.1 KU/L (ref 0–0.34)
ELM TREE: 0.17 KU/L (ref 0–0.34)
HISTAMINE BLD-SCNC: NORMAL NMOL/L
IGE SERPL-ACNC: 93 IU/ML (ref 0–100)
MAPLE/BOXELDER TREE: < 0.1 KU/L (ref 0–0.34)
MOUNTAIN CEDAR TREE: < 0.1 KU/L (ref 0–0.34)
MUCOR RACEMOSUS: < 0.1 KU/L (ref 0–0.34)
NUCLEAR IGG SER QL IF: NORMAL
P. NOTATUM: < 0.1 KU/L (ref 0–0.34)
RUSSIAN THISTLE: < 0.1 KU/L (ref 0–0.34)
SHORT RAGWD(A ARTEMIS.) IGE: < 0.1 KU/L (ref 0–0.34)
STRONGYLOIDES IGG SER IA-ACNC: NORMAL
TIMOTHY GRASS: < 0.1 KU/L (ref 0–0.34)

## 2024-06-01 LAB
C1INH ACT/NOR SERPL: 111 %
C1INH SERPL-MCNC: 29 MG/DL (ref 21–38)
C4 SERPL-MCNC: 24 MG/DL (ref 10–40)

## 2024-06-03 ENCOUNTER — NURSE ONLY (OUTPATIENT)
Dept: ALLERGY | Age: 59
End: 2024-06-03
Payer: COMMERCIAL

## 2024-06-03 VITALS — DIASTOLIC BLOOD PRESSURE: 89 MMHG | HEART RATE: 76 BPM | SYSTOLIC BLOOD PRESSURE: 142 MMHG

## 2024-06-03 DIAGNOSIS — J30.1 SEASONAL ALLERGIC RHINITIS DUE TO POLLEN: ICD-10-CM

## 2024-06-03 DIAGNOSIS — J30.81 ALLERGIC RHINITIS DUE TO ANIMAL HAIR AND DANDER: ICD-10-CM

## 2024-06-03 DIAGNOSIS — J30.9 CHRONIC ALLERGIC RHINITIS: Primary | ICD-10-CM

## 2024-06-03 PROCEDURE — 95117 IMMUNOTHERAPY INJECTIONS: CPT | Performed by: NURSE PRACTITIONER

## 2024-06-03 NOTE — PROGRESS NOTES

## 2024-06-11 ENCOUNTER — NURSE ONLY (OUTPATIENT)
Dept: ALLERGY | Age: 59
End: 2024-06-11
Payer: COMMERCIAL

## 2024-06-11 VITALS
RESPIRATION RATE: 18 BRPM | HEART RATE: 76 BPM | OXYGEN SATURATION: 97 % | DIASTOLIC BLOOD PRESSURE: 84 MMHG | SYSTOLIC BLOOD PRESSURE: 145 MMHG

## 2024-06-11 DIAGNOSIS — J30.1 SEASONAL ALLERGIC RHINITIS DUE TO POLLEN: ICD-10-CM

## 2024-06-11 DIAGNOSIS — J30.81 ALLERGIC RHINITIS DUE TO ANIMAL HAIR AND DANDER: ICD-10-CM

## 2024-06-11 DIAGNOSIS — J30.9 CHRONIC ALLERGIC RHINITIS: Primary | ICD-10-CM

## 2024-06-11 PROCEDURE — 95117 IMMUNOTHERAPY INJECTIONS: CPT | Performed by: NURSE PRACTITIONER

## 2024-06-11 NOTE — PROGRESS NOTES
PATIENT HAS THE FOLLOWING DIAGNOSIS SUPPORTING ADMINISTRATION OF ALLERGY INJECTIONS: J30.1Allergic rhinitis due to pollen  AND 44420 MULTIPLE ALLERGY INJECTIONS FOR ALLERGY INJECTION ADMINISTRATION      Patient here for allergy injection today.  Patient was presented with the opportunity to speak with provider and ask questions regarding allergy injections.  Patient was also instructed they need to wait 30 minutes after receiving allergy injections. All risks associated with potential adverse effects have been explained to patient and patient handout was provided following allergy testing.    After consent obtained/verified, allergy injection subcutaneously given in back of arm(s).  Please see below for specific details of site injections, concentration of serum, and volume injected.    VIAL COLOR OF ALL VIALS TODAY IS red 1:1. Vial allergy w/v concentration today is: 1:1     ALLERGY INJECTION FROM VIAL A GIVEN left  UPPER ARM IN THE AMOUNT OF 0.40 ML    ALLERGY INJECTION FROM VIAL B GIVEN right upper ARM IN THE AMOUNT OF 0.40  ML          Documentation of vial injection specific to arm(s) noted on Allergy Immunotherapy Administration Form.       Patient waited 30 minutes for observation.No  Patient has received information and verbalizes understanding of the potential  health risks associated with allergy injections . Patient understands risks including anaphylaxis and chooses not to wait 30 minutes after administration of allergy injection(s).    Patient tolerated well without adverse reaction while the patient was in the office.    SHOT REACTION TREATMENT INSTRUCTIONS    During the 30 minute wait after an allergy injection the following symptoms should be reported:    Itching other than at the injection site  Hives or swelling other than at the injection site  Redness other than at the injection site  Difficulty breathing  Chest tightness  Difficulty swallowing  Throat tightness    If these symptoms occur, NOTIFY

## 2024-06-18 ENCOUNTER — NURSE ONLY (OUTPATIENT)
Dept: ALLERGY | Age: 59
End: 2024-06-18
Payer: COMMERCIAL

## 2024-06-18 VITALS
OXYGEN SATURATION: 100 % | SYSTOLIC BLOOD PRESSURE: 155 MMHG | HEART RATE: 76 BPM | DIASTOLIC BLOOD PRESSURE: 100 MMHG | RESPIRATION RATE: 18 BRPM

## 2024-06-18 DIAGNOSIS — J30.1 SEASONAL ALLERGIC RHINITIS DUE TO POLLEN: ICD-10-CM

## 2024-06-18 DIAGNOSIS — J30.9 CHRONIC ALLERGIC RHINITIS: Primary | ICD-10-CM

## 2024-06-18 PROCEDURE — 95117 IMMUNOTHERAPY INJECTIONS: CPT | Performed by: NURSE PRACTITIONER

## 2024-06-18 NOTE — PROGRESS NOTES
PATIENT HAS THE FOLLOWING DIAGNOSIS SUPPORTING ADMINISTRATION OF ALLERGY INJECTIONS: J30.1Allergic rhinitis due to pollen  AND 18524 MULTIPLE ALLERGY INJECTIONS FOR ALLERGY INJECTION ADMINISTRATION      Patient here for allergy injection today.  Patient was presented with the opportunity to speak with provider and ask questions regarding allergy injections.  Patient was also instructed they need to wait 30 minutes after receiving allergy injections. All risks associated with potential adverse effects have been explained to patient and patient handout was provided following allergy testing.    After consent obtained/verified, allergy injection subcutaneously given in back of arm(s).  Please see below for specific details of site injections, concentration of serum, and volume injected.    VIAL COLOR OF ALL VIALS TODAY IS red 1:1. Vial allergy w/v concentration today is: 1:1     ALLERGY INJECTION FROM VIAL A GIVEN right  UPPER ARM IN THE AMOUNT OF 0.45 ML    ALLERGY INJECTION FROM VIAL B GIVEN left upper ARM IN THE AMOUNT OF 0.45  ML        Documentation of vial injection specific to arm(s) noted on Allergy Immunotherapy Administration Form.       Patient waited 30 minutes for observation.. Yes  Patient has received information and verbalizes understanding of the potential  health risks associated with allergy injections . Patient understands risks including anaphylaxis and chooses not to wait 30 minutes after administration of allergy injection(s).    Patient tolerated well without adverse reaction while the patient was in the office.    SHOT REACTION TREATMENT INSTRUCTIONS    During the 30 minute wait after an allergy injection the following symptoms should be reported:    Itching other than at the injection site  Hives or swelling other than at the injection site  Redness other than at the injection site  Difficulty breathing  Chest tightness  Difficulty swallowing  Throat tightness    If these symptoms occur,

## 2024-06-21 ENCOUNTER — HOSPITAL ENCOUNTER (OUTPATIENT)
Dept: CT IMAGING | Age: 59
Discharge: HOME | End: 2024-06-21
Payer: COMMERCIAL

## 2024-06-21 DIAGNOSIS — J32.4 CHRONIC PANSINUSITIS: ICD-10-CM

## 2024-06-21 PROCEDURE — 70486 CT MAXILLOFACIAL W/O DYE: CPT

## 2024-06-25 ENCOUNTER — NURSE ONLY (OUTPATIENT)
Dept: ALLERGY | Age: 59
End: 2024-06-25
Payer: COMMERCIAL

## 2024-06-25 VITALS — OXYGEN SATURATION: 97 % | DIASTOLIC BLOOD PRESSURE: 81 MMHG | HEART RATE: 82 BPM | SYSTOLIC BLOOD PRESSURE: 130 MMHG

## 2024-06-25 DIAGNOSIS — J30.1 SEASONAL ALLERGIC RHINITIS DUE TO POLLEN: Primary | ICD-10-CM

## 2024-06-25 DIAGNOSIS — J30.9 CHRONIC ALLERGIC RHINITIS: ICD-10-CM

## 2024-06-25 DIAGNOSIS — J30.81 ALLERGIC RHINITIS DUE TO ANIMAL HAIR AND DANDER: ICD-10-CM

## 2024-06-25 PROCEDURE — 95117 IMMUNOTHERAPY INJECTIONS: CPT | Performed by: NURSE PRACTITIONER

## 2024-06-25 NOTE — PROGRESS NOTES
and the following treatment should be administered:    1.  Epinephrine/Auvi Q 1:1000 IM - 0.3 ml if > 66 lbs or more, 0.15 ml if 33 - 63 lbs, or 0.1 ml if <33 lbs     2.  Diphenhydramine - give all intramuscular:     2 to <6 years (off-label use): 6.25 mg,    6 to <12 years: 12.5 to 25 mg;    ?12 years: 25-50 mg.    3.  Famotidine:  Adults 40 mg oral    Adolescents age 16 years and >88 lbs: 40 mg    Children and Adolescents ?16 years of age: Initial: 0.25 mg/kg/dose  every 12 hours (maximum daily dose: 40 mg/day)    Epi/Auvi Q dose may me repeated in 5-15 minutes if adequate resolution of symptoms does not occur    Patient should be observed for at least one hour after final Epi/Auvi Q dose and must be seen by provider.  Patients cannot drive themselves if they have received diphenhydramine.

## 2024-06-26 DIAGNOSIS — Z29.89 IMMUNOTHERAPY: ICD-10-CM

## 2024-06-26 DIAGNOSIS — J30.9 CHRONIC ALLERGIC RHINITIS: Primary | ICD-10-CM

## 2024-06-26 PROCEDURE — 95165 ANTIGEN THERAPY SERVICES: CPT | Performed by: NURSE PRACTITIONER

## 2024-06-26 NOTE — PROGRESS NOTES
ALLERGY EXTRACT MIXING.  Pt's current vials empty. New vials mixed.   DATE OF MIXING= 6/26/24  TOTAL NUMBER OF SET OF VIALS= 2  TOTAL VIALS PREPARED = 2  TOTAL INJECTABLE DOSES =   20 INJECTIONS PER SET  TOTAL ANTICIPATED DOSES = 40 INJECTIONS   TOTAL NUMBER OF INJECTIONS BILLED TODAY = 40      An allergy extract was prepared according to written prescription by provider.  Provider onsite during allergy extract preparation. Patient vial(s) include the following:     RED VIAL - 1:1 CONCENTRATION - EXPIRES 12 MONTHS  YELLOW VIAL-1:10 CONCENTRATION - EXPIRES 12 MONTHS  BLUE VIAL-1:100 CONCENTRATION - EXPIRES 12 MONTHS  GREEN VIAL-1:1,000 CONCENTRATION - EXPIRES 6 MONTHS  SILVER VIAL-1:10,000 CONCENTRATION - EXPIRES 6 MONTHS    Allergy extract was prepared by the following method:   RED TO YELLOW:  Once the  one-to-one concentration was prepared in the red vial, 0.5 ML's was then extracted in place into the yellow vial to achieve a 1:10 concentration.    YELLOW TO BLUE:  Then 0.5 ML's was extracted from the yellow vial and placed into the blue file with dilutant to achieve a 1:100 concentration.    BLUE TO GREEN:  Then 0.5 ML's was extracted from the blue vial and placed into Green vial with dilute to achieve a 1:1,000 concentration.    GREEN TO SILVER:  Then 0.5 ML's was taken from the green vial and placed in the Silver vial with dilutant to achieve a 1:10,000 concentration.      Patient vials were labeled with name, date-of-birth, vial (A,B,or C), concentration, and expiration.    When injecting from a new  vial the first injections is 0.05 ml and are increased by 0.05 increments until 0.5ml from the vial is achieved or the patient reaches maximum tolerated dose.   Injections are given once ot three times weekly and at least 24 hours apart, according to provider orders.   If patient has complications or \"knots\" or maximum tolerance the dose building is reduced, stopped, or maintained according to patient reaction and

## 2024-07-02 ENCOUNTER — NURSE ONLY (OUTPATIENT)
Dept: ALLERGY | Age: 59
End: 2024-07-02
Payer: COMMERCIAL

## 2024-07-02 VITALS — SYSTOLIC BLOOD PRESSURE: 135 MMHG | DIASTOLIC BLOOD PRESSURE: 85 MMHG | HEART RATE: 74 BPM | OXYGEN SATURATION: 98 %

## 2024-07-02 DIAGNOSIS — J30.81 ALLERGIC RHINITIS DUE TO ANIMAL HAIR AND DANDER: ICD-10-CM

## 2024-07-02 DIAGNOSIS — J30.1 SEASONAL ALLERGIC RHINITIS DUE TO POLLEN: Primary | ICD-10-CM

## 2024-07-02 DIAGNOSIS — J30.9 CHRONIC ALLERGIC RHINITIS: ICD-10-CM

## 2024-07-02 PROCEDURE — 95117 IMMUNOTHERAPY INJECTIONS: CPT | Performed by: NURSE PRACTITIONER

## 2024-07-02 NOTE — PROGRESS NOTES
PATIENT HAS THE FOLLOWING DIAGNOSIS SUPPORTING ADMINISTRATION OF ALLERGY INJECTIONS: J30.1Allergic rhinitis due to pollen  AND 23475 MULTIPLE ALLERGY INJECTIONS FOR ALLERGY INJECTION ADMINISTRATION      Patient here for allergy injection today.  Patient was presented with the opportunity to speak with provider and ask questions regarding allergy injections.  Patient was also instructed they need to wait 30 minutes after receiving allergy injections. All risks associated with potential adverse effects have been explained to patient and patient handout was provided following allergy testing.    After consent obtained/verified, allergy injection subcutaneously given in back of arm(s).  Please see below for specific details of site injections, concentration of serum, and volume injected.    VIAL COLOR OF ALL VIALS TODAY IS red 1:1. Vial allergy w/v concentration today is: 1:1     ALLERGY INJECTION FROM VIAL A GIVEN right  UPPER ARM IN THE AMOUNT OF 0.50 ML    ALLERGY INJECTION FROM VIAL B GIVEN left upper ARM IN THE AMOUNT OF 0.50  ML        Documentation of vial injection specific to arm(s) noted on Allergy Immunotherapy Administration Form.       Patient waited 30 minutes for observation.No  Patient has received information and verbalizes understanding of the potential  health risks associated with allergy injections . Patient understands risks including anaphylaxis and chooses not to wait 30 minutes after administration of allergy injection(s).    Patient tolerated well without adverse reaction while the patient was in the office.    SHOT REACTION TREATMENT INSTRUCTIONS    During the 30 minute wait after an allergy injection the following symptoms should be reported:    Itching other than at the injection site  Hives or swelling other than at the injection site  Redness other than at the injection site  Difficulty breathing  Chest tightness  Difficulty swallowing  Throat tightness    If these symptoms occur, NOTIFY

## 2024-07-09 ENCOUNTER — NURSE ONLY (OUTPATIENT)
Dept: ALLERGY | Age: 59
End: 2024-07-09
Payer: COMMERCIAL

## 2024-07-09 VITALS
SYSTOLIC BLOOD PRESSURE: 145 MMHG | RESPIRATION RATE: 18 BRPM | HEART RATE: 75 BPM | DIASTOLIC BLOOD PRESSURE: 83 MMHG | OXYGEN SATURATION: 97 %

## 2024-07-09 DIAGNOSIS — J30.9 CHRONIC ALLERGIC RHINITIS: ICD-10-CM

## 2024-07-09 DIAGNOSIS — J30.1 SEASONAL ALLERGIC RHINITIS DUE TO POLLEN: Primary | ICD-10-CM

## 2024-07-09 PROCEDURE — 95117 IMMUNOTHERAPY INJECTIONS: CPT | Performed by: NURSE PRACTITIONER

## 2024-07-09 NOTE — PROGRESS NOTES
PATIENT HAS THE FOLLOWING DIAGNOSIS SUPPORTING ADMINISTRATION OF ALLERGY INJECTIONS: J30.1Allergic rhinitis due to pollen  AND [id number] MULTIPLE ALLERGY INJECTIONS FOR ALLERGY INJECTION ADMINISTRATION      Patient here for allergy injection today.  Patient was presented with the opportunity to speak with provider and ask questions regarding allergy injections.  Patient was also instructed they need to wait 30 minutes after receiving allergy injections. All risks associated with potential adverse effects have been explained to patient and patient handout was provided following allergy testing.    After consent obtained/verified, allergy injection subcutaneously given in back of arm(s).  Please see below for specific details of site injections, concentration of serum, and volume injected.    VIAL COLOR OF ALL VIALS TODAY IS red 1:1. Vial allergy w/v concentration today is: 1:1     ALLERGY INJECTION FROM VIAL A GIVEN left  UPPER ARM IN THE AMOUNT OF 0.50 ML    ALLERGY INJECTION FROM VIAL B GIVEN right upper ARM IN THE AMOUNT OF 0.50  ML          Documentation of vial injection specific to arm(s) noted on Allergy Immunotherapy Administration Form.       Patient waited 30 minutes for observation.Yes  Patient has received information and verbalizes understanding of the potential  health risks associated with allergy injections . Patient understands risks including anaphylaxis and chooses not to wait 30 minutes after administration of allergy injection(s).    Patient tolerated well without adverse reaction while the patient was in the office.    SHOT REACTION TREATMENT INSTRUCTIONS    During the 30 minute wait after an allergy injection the following symptoms should be reported:    Itching other than at the injection site  Hives or swelling other than at the injection site  Redness other than at the injection site  Difficulty breathing  Chest tightness  Difficulty swallowing  Throat tightness    If these symptoms occur,

## 2024-07-17 ENCOUNTER — LAB (OUTPATIENT)
Dept: ALLERGY | Age: 59
End: 2024-07-17
Payer: COMMERCIAL

## 2024-07-17 VITALS
HEART RATE: 74 BPM | BODY MASS INDEX: 33.15 KG/M2 | DIASTOLIC BLOOD PRESSURE: 86 MMHG | SYSTOLIC BLOOD PRESSURE: 144 MMHG | OXYGEN SATURATION: 95 % | HEIGHT: 70 IN

## 2024-07-17 DIAGNOSIS — J30.1 SEASONAL ALLERGIC RHINITIS DUE TO POLLEN: Primary | ICD-10-CM

## 2024-07-17 PROCEDURE — 95117 IMMUNOTHERAPY INJECTIONS: CPT | Performed by: NURSE PRACTITIONER

## 2024-07-17 NOTE — PROGRESS NOTES
PROVIDER and the following treatment should be administered:    1.  Epinephrine/Auvi Q 1:1000 IM - 0.3 ml if > 66 lbs or more, 0.15 ml if 33 - 63 lbs, or 0.1 ml if <33 lbs     2.  Diphenhydramine - give all intramuscular:     2 to <6 years (off-label use): 6.25 mg,    6 to <12 years: 12.5 to 25 mg;    ?12 years: 25-50 mg.    3.  Famotidine:  Adults 40 mg oral    Adolescents age 16 years and >88 lbs: 40 mg    Children and Adolescents ?16 years of age: Initial: 0.25 mg/kg/dose  every 12 hours (maximum daily dose: 40 mg/day)    Epi/Auvi Q dose may me repeated in 5-15 minutes if adequate resolution of symptoms does not occur    Patient should be observed for at least one hour after final Epi/Auvi Q dose and must be seen by provider.  Patients cannot drive themselves if they have received diphenhydramine.

## 2024-07-23 ENCOUNTER — LAB (OUTPATIENT)
Dept: ALLERGY | Age: 59
End: 2024-07-23
Payer: COMMERCIAL

## 2024-07-23 VITALS
OXYGEN SATURATION: 96 % | HEART RATE: 75 BPM | DIASTOLIC BLOOD PRESSURE: 81 MMHG | BODY MASS INDEX: 33.15 KG/M2 | HEIGHT: 70 IN | SYSTOLIC BLOOD PRESSURE: 136 MMHG

## 2024-07-23 DIAGNOSIS — J30.1 SEASONAL ALLERGIC RHINITIS DUE TO POLLEN: Primary | ICD-10-CM

## 2024-07-23 DIAGNOSIS — J30.9 CHRONIC ALLERGIC RHINITIS: ICD-10-CM

## 2024-07-23 PROCEDURE — 95117 IMMUNOTHERAPY INJECTIONS: CPT | Performed by: NURSE PRACTITIONER

## 2024-07-23 NOTE — PROGRESS NOTES
PATIENT HAS THE FOLLOWING DIAGNOSIS SUPPORTING ADMINISTRATION OF ALLERGY INJECTIONS: J30.1Allergic rhinitis due to pollen  AND [id number] MULTIPLE ALLERGY INJECTIONS FOR ALLERGY INJECTION ADMINISTRATION      Patient here for allergy injection today.  Patient was presented with the opportunity to speak with provider and ask questions regarding allergy injections.  Patient was also instructed they need to wait 30 minutes after receiving allergy injections. All risks associated with potential adverse effects have been explained to patient and patient handout was provided following allergy testing.    After consent obtained/verified, allergy injection subcutaneously given in back of arm(s).  Please see below for specific details of site injections, concentration of serum, and volume injected.    VIAL COLOR OF ALL VIALS TODAY IS red 1:1. Vial allergy w/v concentration today is: 1:1     ALLERGY INJECTION FROM VIAL A GIVEN left  UPPER ARM IN THE AMOUNT OF 0.50 ML    ALLERGY INJECTION FROM VIAL B GIVEN right  ARM IN THE AMOUNT OF 0.50  ML    Documentation of vial injection specific to arm(s) noted on Allergy Immunotherapy Administration Form.       Patient waited 30 minutes for observation.No  Patient has received information and verbalizes understanding of the potential  health risks associated with allergy injections . Patient understands risks including anaphylaxis and chooses not to wait 30 minutes after administration of allergy injection(s).    Patient tolerated well without adverse reaction while the patient was in the office.    SHOT REACTION TREATMENT INSTRUCTIONS    During the 30 minute wait after an allergy injection the following symptoms should be reported:    Itching other than at the injection site  Hives or swelling other than at the injection site  Redness other than at the injection site  Difficulty breathing  Chest tightness  Difficulty swallowing  Throat tightness    If these symptoms occur, NOTIFY PROVIDER

## 2024-07-31 ENCOUNTER — NURSE ONLY (OUTPATIENT)
Dept: ALLERGY | Age: 59
End: 2024-07-31
Payer: COMMERCIAL

## 2024-07-31 ENCOUNTER — OFFICE VISIT (OUTPATIENT)
Dept: ALLERGY | Age: 59
End: 2024-07-31
Payer: COMMERCIAL

## 2024-07-31 VITALS
OXYGEN SATURATION: 97 % | HEART RATE: 77 BPM | DIASTOLIC BLOOD PRESSURE: 82 MMHG | SYSTOLIC BLOOD PRESSURE: 160 MMHG | WEIGHT: 233.6 LBS | HEIGHT: 70 IN | BODY MASS INDEX: 33.44 KG/M2

## 2024-07-31 DIAGNOSIS — Z91.09 MITE ALLERGY: ICD-10-CM

## 2024-07-31 DIAGNOSIS — J30.1 NON-SEASONAL ALLERGIC RHINITIS DUE TO POLLEN: ICD-10-CM

## 2024-07-31 DIAGNOSIS — R76.8 ANA POSITIVE: ICD-10-CM

## 2024-07-31 DIAGNOSIS — I15.9 SECONDARY HYPERTENSION: Primary | ICD-10-CM

## 2024-07-31 DIAGNOSIS — J30.81 CAT ALLERGIES: ICD-10-CM

## 2024-07-31 DIAGNOSIS — J30.9 ALLERGIC SINUSITIS: ICD-10-CM

## 2024-07-31 DIAGNOSIS — B35.0 TINEA CAPITIS: ICD-10-CM

## 2024-07-31 DIAGNOSIS — J30.81 CAT ALLERGIES: Primary | ICD-10-CM

## 2024-07-31 DIAGNOSIS — K08.89 PAIN, DENTAL: ICD-10-CM

## 2024-07-31 PROCEDURE — 99214 OFFICE O/P EST MOD 30 MIN: CPT | Performed by: NURSE PRACTITIONER

## 2024-07-31 PROCEDURE — 95117 IMMUNOTHERAPY INJECTIONS: CPT | Performed by: NURSE PRACTITIONER

## 2024-07-31 RX ORDER — LORATADINE 10 MG/1
10 TABLET ORAL DAILY
COMMUNITY

## 2024-07-31 RX ORDER — KETOCONAZOLE 20 MG/ML
SHAMPOO TOPICAL
Qty: 120 ML | Refills: 1 | Status: SHIPPED | OUTPATIENT
Start: 2024-07-31

## 2024-07-31 RX ORDER — SOD CHLOR,BICARB/SQUEEZ BOTTLE
PACKET, WITH RINSE DEVICE NASAL
Qty: 1 EACH | Refills: 11
Start: 2024-07-31

## 2024-07-31 ASSESSMENT — ENCOUNTER SYMPTOMS: RHINORRHEA: 1

## 2024-07-31 NOTE — PROGRESS NOTES
The sphenoethmoidal recesses  are patent.    Maxillary sinuses: Normally aerated and pneumatized. There is trace mucosal  thickening along the floors. There is no layering fluid. The drainage  passageways are widely patent. The patient has had uncinectomies.    Nasal cavity: The nasal septum is midline. The middle nasal turbinates have been  resected. The other nasal turbinates appear normal. No polyps or masses are  noted.    The mastoid air cells and middle ear cavities are normally aerated.    There are no suspicious findings in the imaged aspects of the brain parenchyma  and facial soft tissues.    Impression  1. No evidence of acute sinusitis.  2. Trace mucosal thickening in the maxillary sinuses and the ethmoid air cells.  3. Mucosal thickening along the floor of the right frontal sinus.  4. The drainage passageways are widely patent.          **This report has been created using voice recognition software. It may contain  minor errors which are inherent in voice recognition technology.**        Electronically signed by Dr. Mary Chandler     No results found for this or any previous visit.     No results found for this or any previous visit.         All current and previous medial labs have been reviewed and discussed with patient       Procedures:  Allergy Testing:10/13/2022       Assessment/Orders:   CURRENT MEDS W/ ASSOC DIAG           Start Date End Date     ALLERGEN EXTRACT  --  --     Associated Diagnoses:  --     Ascorbic Acid (VITAMIN C) 500 MG tablet  --  --     Associated Diagnoses:  --     azelastine (ASTELIN) 0.1 % nasal spray  05/23/24  --     1 spray by Nasal route 2 times daily as needed for Rhinitis Use in each nostril as directed     Associated Diagnoses:  Non-seasonal allergic rhinitis, unspecified trigger     busPIRone (BUSPAR) 15 MG tablet  04/18/23  --     Take 15 mg by mouth 3 times daily     Associated Diagnoses:  --     cetirizine (ZYRTEC) 10 MG tablet  10/02/23  --     Take 1 tablet by

## 2024-07-31 NOTE — PATIENT INSTRUCTIONS
7/31/2024   ENRIQUE Batista CNP   [facility]  [address] [place] [address]  Dept: [phone]  Dept Fax: [phone]  Loc: [phone]     Dear Placido,  Thank you for your recent visit. We are committed to providing amazing patient care, and would like to make sure we were successful in providing you a great experience at our office. In the coming days, you may receive a survey via mail or email about your experience with my office. We ask that you please take a couple of minutes to complete and return it. We use our patients’ feedback to make improvements within the office that will make the experience even better for all of our patients.  We appreciate your time in helping us be the best with can!!!    Thank you, and be well!  ENRIQUE Batista CNP

## 2024-08-06 ENCOUNTER — NURSE ONLY (OUTPATIENT)
Dept: ALLERGY | Age: 59
End: 2024-08-06
Payer: COMMERCIAL

## 2024-08-06 VITALS — OXYGEN SATURATION: 95 % | DIASTOLIC BLOOD PRESSURE: 89 MMHG | HEART RATE: 72 BPM | SYSTOLIC BLOOD PRESSURE: 162 MMHG

## 2024-08-06 DIAGNOSIS — J30.1 NON-SEASONAL ALLERGIC RHINITIS DUE TO POLLEN: ICD-10-CM

## 2024-08-06 DIAGNOSIS — J30.81 CAT ALLERGIES: Primary | ICD-10-CM

## 2024-08-06 DIAGNOSIS — J30.9 ALLERGIC SINUSITIS: ICD-10-CM

## 2024-08-06 PROCEDURE — 95117 IMMUNOTHERAPY INJECTIONS: CPT | Performed by: NURSE PRACTITIONER

## 2024-08-06 NOTE — PROGRESS NOTES
PATIENT HAS THE FOLLOWING DIAGNOSIS SUPPORTING ADMINISTRATION OF ALLERGY INJECTIONS: J30.1Allergic rhinitis due to pollen  AND [id number] MULTIPLE ALLERGY INJECTIONS FOR ALLERGY INJECTION ADMINISTRATION      Patient here for allergy injection today.  Patient was presented with the opportunity to speak with provider and ask questions regarding allergy injections.  Patient was also instructed they need to wait 30 minutes after receiving allergy injections. All risks associated with potential adverse effects have been explained to patient and patient handout was provided following allergy testing.    After consent obtained/verified, allergy injection subcutaneously given in back of arm(s).  Please see below for specific details of site injections, concentration of serum, and volume injected.    VIAL COLOR OF ALL VIALS TODAY IS red 1:1. Vial allergy w/v concentration today is: 1:1     ALLERGY INJECTION FROM VIAL A GIVEN left  UPPER ARM IN THE AMOUNT OF 0.40 ML    ALLERGY INJECTION FROM VIAL B GIVEN right upper ARM IN THE AMOUNT OF 0.40  ML          Documentation of vial injection specific to arm(s) noted on Allergy Immunotherapy Administration Form.       Patient waited 30 minutes for observation.No  Patient has received information and verbalizes understanding of the potential  health risks associated with allergy injections . Patient understands risks including anaphylaxis and chooses not to wait 30 minutes after administration of allergy injection(s).    Patient tolerated well without adverse reaction while the patient was in the office.    SHOT REACTION TREATMENT INSTRUCTIONS    During the 30 minute wait after an allergy injection the following symptoms should be reported:    Itching other than at the injection site  Hives or swelling other than at the injection site  Redness other than at the injection site  Difficulty breathing  Chest tightness  Difficulty swallowing  Throat tightness    If these symptoms occur, NOTIFY

## 2024-08-13 ENCOUNTER — NURSE ONLY (OUTPATIENT)
Dept: ALLERGY | Age: 59
End: 2024-08-13
Payer: COMMERCIAL

## 2024-08-13 VITALS
HEART RATE: 75 BPM | RESPIRATION RATE: 18 BRPM | DIASTOLIC BLOOD PRESSURE: 85 MMHG | OXYGEN SATURATION: 98 % | SYSTOLIC BLOOD PRESSURE: 160 MMHG

## 2024-08-13 DIAGNOSIS — J30.9 ALLERGIC SINUSITIS: ICD-10-CM

## 2024-08-13 DIAGNOSIS — J30.81 CAT ALLERGIES: Primary | ICD-10-CM

## 2024-08-13 DIAGNOSIS — J30.1 NON-SEASONAL ALLERGIC RHINITIS DUE TO POLLEN: ICD-10-CM

## 2024-08-13 PROCEDURE — 95117 IMMUNOTHERAPY INJECTIONS: CPT | Performed by: NURSE PRACTITIONER

## 2024-08-13 NOTE — PROGRESS NOTES
Pt in today for allergy injections. Patient had elevated blood pressure as I took his vitals. Informed provider on elevated blood pressure. Patient stated he took blood pressure medication 30 minutes prior to appointment. Provider okayed myself to give patient allergy injections.

## 2024-08-13 NOTE — PROGRESS NOTES
PATIENT HAS THE FOLLOWING DIAGNOSIS SUPPORTING ADMINISTRATION OF ALLERGY INJECTIONS: J30.1Allergic rhinitis due to pollen  AND 54235 MULTIPLE ALLERGY INJECTIONS FOR ALLERGY INJECTION ADMINISTRATION      Patient here for allergy injection today.  Patient was presented with the opportunity to speak with provider and ask questions regarding allergy injections.  Patient was also instructed they need to wait 30 minutes after receiving allergy injections. All risks associated with potential adverse effects have been explained to patient and patient handout was provided following allergy testing.    After consent obtained/verified, allergy injection subcutaneously given in back of arm(s).  Please see below for specific details of site injections, concentration of serum, and volume injected.    VIAL COLOR OF ALL VIALS TODAY IS red 1:1. Vial allergy w/v concentration today is: 1:1     ALLERGY INJECTION FROM VIAL A GIVEN right  UPPER ARM IN THE AMOUNT OF 0.45 ML    ALLERGY INJECTION FROM VIAL B GIVEN left upper ARM IN THE AMOUNT OF 0.45  ML          Documentation of vial injection specific to arm(s) noted on Allergy Immunotherapy Administration Form.       Patient waited 30 minutes for observation.No  Patient has received information and verbalizes understanding of the potential  health risks associated with allergy injections . Patient understands risks including anaphylaxis and chooses not to wait 30 minutes after administration of allergy injection(s).    Patient tolerated well without adverse reaction while the patient was in the office.    SHOT REACTION TREATMENT INSTRUCTIONS    During the 30 minute wait after an allergy injection the following symptoms should be reported:    Itching other than at the injection site  Hives or swelling other than at the injection site  Redness other than at the injection site  Difficulty breathing  Chest tightness  Difficulty swallowing  Throat tightness    If these symptoms occur, NOTIFY

## 2024-08-27 ENCOUNTER — TELEPHONE (OUTPATIENT)
Dept: ALLERGY | Age: 59
End: 2024-08-27

## 2024-08-27 ENCOUNTER — NURSE ONLY (OUTPATIENT)
Dept: ALLERGY | Age: 59
End: 2024-08-27
Payer: COMMERCIAL

## 2024-08-27 DIAGNOSIS — J30.9 CHRONIC ALLERGIC RHINITIS: Primary | ICD-10-CM

## 2024-08-27 DIAGNOSIS — J30.1 NON-SEASONAL ALLERGIC RHINITIS DUE TO POLLEN: ICD-10-CM

## 2024-08-27 PROCEDURE — 95117 IMMUNOTHERAPY INJECTIONS: CPT | Performed by: NURSE PRACTITIONER

## 2024-08-27 NOTE — TELEPHONE ENCOUNTER
Pt in the office for allergy injections and states after next week, he is quitting allergy injections.

## 2024-08-27 NOTE — PROGRESS NOTES
PATIENT HAS THE FOLLOWING DIAGNOSIS SUPPORTING ADMINISTRATION OF ALLERGY INJECTIONS: J30.1Allergic rhinitis due to pollen  AND 34552 MULTIPLE ALLERGY INJECTIONS FOR ALLERGY INJECTION ADMINISTRATION      Patient here for allergy injection today.  Patient was presented with the opportunity to speak with provider and ask questions regarding allergy injections.  Patient was also instructed they need to wait 30 minutes after receiving allergy injections. All risks associated with potential adverse effects have been explained to patient and patient handout was provided following allergy testing.    After consent obtained/verified, allergy injection subcutaneously given in back of arm(s).  Please see below for specific details of site injections, concentration of serum, and volume injected.    VIAL COLOR OF ALL VIALS TODAY IS red 1:1. Vial allergy w/v concentration today is: 1:1     ALLERGY INJECTION FROM VIAL A GIVEN left  UPPER ARM IN THE AMOUNT OF 0.50 ML    ALLERGY INJECTION FROM VIAL B GIVEN right upper ARM IN THE AMOUNT OF 0.50  ML        Documentation of vial injection specific to arm(s) noted on Allergy Immunotherapy Administration Form.       Patient waited 30 minutes for observation.No  Patient has received information and verbalizes understanding of the potential  health risks associated with allergy injections . Patient understands risks including anaphylaxis and chooses not to wait 30 minutes after administration of allergy injection(s).    Patient tolerated well without adverse reaction while the patient was in the office.    SHOT REACTION TREATMENT INSTRUCTIONS    During the 30 minute wait after an allergy injection the following symptoms should be reported:    Itching other than at the injection site  Hives or swelling other than at the injection site  Redness other than at the injection site  Difficulty breathing  Chest tightness  Difficulty swallowing  Throat tightness    If these symptoms occur, NOTIFY

## 2024-09-03 ENCOUNTER — NURSE ONLY (OUTPATIENT)
Dept: ALLERGY | Age: 59
End: 2024-09-03
Payer: COMMERCIAL

## 2024-09-03 VITALS
SYSTOLIC BLOOD PRESSURE: 153 MMHG | OXYGEN SATURATION: 98 % | RESPIRATION RATE: 18 BRPM | HEART RATE: 78 BPM | DIASTOLIC BLOOD PRESSURE: 86 MMHG

## 2024-09-03 DIAGNOSIS — J30.1 NON-SEASONAL ALLERGIC RHINITIS DUE TO POLLEN: ICD-10-CM

## 2024-09-03 DIAGNOSIS — J30.9 CHRONIC ALLERGIC RHINITIS: Primary | ICD-10-CM

## 2024-09-03 DIAGNOSIS — J30.81 CAT ALLERGIES: ICD-10-CM

## 2024-09-03 PROCEDURE — 95117 IMMUNOTHERAPY INJECTIONS: CPT | Performed by: NURSE PRACTITIONER

## 2024-09-03 NOTE — PROGRESS NOTES
PATIENT HAS THE FOLLOWING DIAGNOSIS SUPPORTING ADMINISTRATION OF ALLERGY INJECTIONS: J30.1Allergic rhinitis due to pollen  AND 64446 MULTIPLE ALLERGY INJECTIONS FOR ALLERGY INJECTION ADMINISTRATION      Patient here for allergy injection today.  Patient was presented with the opportunity to speak with provider and ask questions regarding allergy injections.  Patient was also instructed they need to wait 30 minutes after receiving allergy injections. All risks associated with potential adverse effects have been explained to patient and patient handout was provided following allergy testing.    After consent obtained/verified, allergy injection subcutaneously given in back of arm(s).  Please see below for specific details of site injections, concentration of serum, and volume injected.    VIAL COLOR OF ALL VIALS TODAY IS red 1:1. Vial allergy w/v concentration today is: 1:1     ALLERGY INJECTION FROM VIAL A GIVEN right  UPPER ARM IN THE AMOUNT OF 0.50 ML    ALLERGY INJECTION FROM VIAL B GIVEN left upper ARM IN THE AMOUNT OF 0.50  ML          Documentation of vial injection specific to arm(s) noted on Allergy Immunotherapy Administration Form.       Patient waited 30 minutes for observation.No  Patient has received information and verbalizes understanding of the potential  health risks associated with allergy injections . Patient understands risks including anaphylaxis and chooses not to wait 30 minutes after administration of allergy injection(s).    Patient tolerated well without adverse reaction while the patient was in the office.    SHOT REACTION TREATMENT INSTRUCTIONS    During the 30 minute wait after an allergy injection the following symptoms should be reported:    Itching other than at the injection site  Hives or swelling other than at the injection site  Redness other than at the injection site  Difficulty breathing  Chest tightness  Difficulty swallowing  Throat tightness    If these symptoms occur, NOTIFY

## 2024-11-27 ENCOUNTER — OFFICE VISIT (OUTPATIENT)
Dept: RHEUMATOLOGY | Age: 59
End: 2024-11-27

## 2024-11-27 VITALS
HEIGHT: 70 IN | OXYGEN SATURATION: 98 % | WEIGHT: 232.1 LBS | SYSTOLIC BLOOD PRESSURE: 134 MMHG | DIASTOLIC BLOOD PRESSURE: 84 MMHG | BODY MASS INDEX: 33.23 KG/M2 | HEART RATE: 82 BPM

## 2024-11-27 DIAGNOSIS — R76.8 POSITIVE ANA (ANTINUCLEAR ANTIBODY): Primary | ICD-10-CM

## 2024-11-27 PROCEDURE — 99204 OFFICE O/P NEW MOD 45 MIN: CPT | Performed by: INTERNAL MEDICINE

## 2024-11-27 ASSESSMENT — ENCOUNTER SYMPTOMS
ABDOMINAL PAIN: 0
SHORTNESS OF BREATH: 0
BLOOD IN STOOL: 0
NAUSEA: 0
VOMITING: 0
COUGH: 0

## 2024-11-27 NOTE — PROGRESS NOTES
[facility]   Rheumatology Clinic Note      11/27/2024       Reason for Consult:  positive ROMANA  Requesting Physician:  Joyce Sykes APRN - C*     CHIEF COMPLAINT:    Chief Complaint   Patient presents with    New Patient     postive romana    Other     Pt has no concerns with joints.  Issues with with Rhinitis           HISTORY OF PRESENT ILLNESS:    59 y.o. male presents today for evaluation of positive ROMANA. This test was part of work up of runny nose and eye \"inflammation\".    Reports that he underwent sinus surgery and surgery for deviated septum in the past.  His main complaint is persistent constant runny nose and teary eyes associated with eye irritation that is all year long. Has been following with allergist. Took different anti-histamines with little benefits. This is associated with throbbing in his nasal passages and sinuses, sometimes causing headaches.    No skin rash, photosensitivity.  No episodes of painful swollen joints.  No prolonged morning stiffness.  No oral ulcers.  No SOB.   No Raynaud's.       Past Medical History:     has a past medical history of Acute pancreatitis, Anxiety, GERD (gastroesophageal reflux disease), Hyperlipidemia, Hypertension, Patellar bursitis of left knee, and Psychiatric problem.    Past Surgical History:     has a past surgical history that includes Colonoscopy (unsure); Endoscopy, colon, diagnostic (unsure); sinus surgery; Vocal Cord Surgery; Skin tag removal; and Anus surgery (N/A, 11/2/2023).    Current Medications:      Current Outpatient Medications:     loratadine (CLARITIN) 10 MG tablet, Take 1 tablet by mouth daily, Disp: , Rfl:     ketoconazole (NIZORAL) 2 % shampoo, Apply topically daily as needed., Disp: 120 mL, Rfl: 1    Hypertonic Nasal Wash (SINUS RINSE BOTTLE KIT) PACK, Use with distilled water and salt packet once daily, Disp: 1 each, Rfl: 11    azelastine (ASTELIN) 0.1 % nasal spray, 1 spray by Nasal route 2 times daily as needed for

## 2024-12-02 ENCOUNTER — LAB (OUTPATIENT)
Dept: LAB | Age: 59
End: 2024-12-02

## 2024-12-02 DIAGNOSIS — R76.8 POSITIVE ANA (ANTINUCLEAR ANTIBODY): ICD-10-CM

## 2024-12-09 ENCOUNTER — TELEPHONE (OUTPATIENT)
Dept: RHEUMATOLOGY | Age: 59
End: 2024-12-09

## 2024-12-09 NOTE — TELEPHONE ENCOUNTER
Please inform pt that the specific testing for lupus and lupus-like conditions came back negative. The positive RALPH is likely a false positive tet result.

## 2025-08-06 ENCOUNTER — OFFICE VISIT (OUTPATIENT)
Dept: SURGERY | Age: 60
End: 2025-08-06
Payer: MEDICARE

## 2025-08-06 VITALS
SYSTOLIC BLOOD PRESSURE: 144 MMHG | BODY MASS INDEX: 28.2 KG/M2 | HEIGHT: 70 IN | RESPIRATION RATE: 18 BRPM | OXYGEN SATURATION: 98 % | DIASTOLIC BLOOD PRESSURE: 88 MMHG | TEMPERATURE: 97.7 F | WEIGHT: 197 LBS | HEART RATE: 70 BPM

## 2025-08-06 DIAGNOSIS — M53.3 PAIN IN THE COCCYX: Primary | ICD-10-CM

## 2025-08-06 PROCEDURE — 99213 OFFICE O/P EST LOW 20 MIN: CPT | Performed by: SURGERY

## 2025-08-06 PROCEDURE — G8417 CALC BMI ABV UP PARAM F/U: HCPCS | Performed by: SURGERY

## 2025-08-06 PROCEDURE — 1036F TOBACCO NON-USER: CPT | Performed by: SURGERY

## 2025-08-06 PROCEDURE — G8427 DOCREV CUR MEDS BY ELIG CLIN: HCPCS | Performed by: SURGERY

## 2025-08-06 PROCEDURE — 3017F COLORECTAL CA SCREEN DOC REV: CPT | Performed by: SURGERY

## 2025-08-06 RX ORDER — MULTIVITAMIN WITH IRON
1 TABLET ORAL DAILY
COMMUNITY

## 2025-08-27 ENCOUNTER — LAB (OUTPATIENT)
Dept: LAB | Age: 60
End: 2025-08-27

## 2025-08-27 LAB
25(OH)D3 SERPL-MCNC: 84 NG/ML (ref 30–100)
AMYLASE SERPL-CCNC: 71 U/L (ref 28–100)
BASOPHILS ABSOLUTE: 0 THOU/MM3 (ref 0–0.1)
BASOPHILS NFR BLD AUTO: 0.5 %
CORTIS SERPL-MCNC: 20 UG/DL
CORTISOL COLLECTION INFO: NORMAL
DEPRECATED RDW RBC AUTO: 41.2 FL (ref 35–45)
EOSINOPHIL NFR BLD AUTO: 5.5 %
EOSINOPHILS ABSOLUTE: 0.3 THOU/MM3 (ref 0–0.4)
ERYTHROCYTE [DISTWIDTH] IN BLOOD BY AUTOMATED COUNT: 12.7 % (ref 11.5–14.5)
FERRITIN SERPL IA-MCNC: 69 NG/ML (ref 30–400)
HCT VFR BLD AUTO: 47.8 % (ref 42–52)
HGB BLD-MCNC: 15.8 GM/DL (ref 14–18)
HOMOCYSTEINE: 12.2 UMOL/L (ref 0–15)
IGA SERPL-MCNC: 123 MG/DL (ref 70–400)
IMM GRANULOCYTES # BLD AUTO: 0.02 THOU/MM3 (ref 0–0.07)
IMM GRANULOCYTES NFR BLD AUTO: 0.3 %
LIPASE SERPL-CCNC: 58 U/L (ref 13–60)
LYMPHOCYTES ABSOLUTE: 1.5 THOU/MM3 (ref 1–4.8)
LYMPHOCYTES NFR BLD AUTO: 26.3 %
MCH RBC QN AUTO: 29.5 PG (ref 26–33)
MCHC RBC AUTO-ENTMCNC: 33.1 GM/DL (ref 32.2–35.5)
MCV RBC AUTO: 89.2 FL (ref 80–94)
MONOCYTES ABSOLUTE: 0.4 THOU/MM3 (ref 0.4–1.3)
MONOCYTES NFR BLD AUTO: 6.2 %
NEUTROPHILS ABSOLUTE: 3.5 THOU/MM3 (ref 1.8–7.7)
NEUTROPHILS NFR BLD AUTO: 61.2 %
NRBC BLD AUTO-RTO: 0 /100 WBC
PLATELET # BLD AUTO: 222 THOU/MM3 (ref 130–400)
PMV BLD AUTO: 11.6 FL (ref 9.4–12.4)
RBC # BLD AUTO: 5.36 MILL/MM3 (ref 4.7–6.1)
T4 SERPL-MCNC: 7.5 UG/DL (ref 4.5–11.7)
TSH SERPL DL<=0.05 MIU/L-ACNC: 2.37 UIU/ML (ref 0.27–4.2)
WBC # BLD AUTO: 5.8 THOU/MM3 (ref 4.8–10.8)

## 2025-08-28 LAB
C-REACTIVE PROTEIN, HIGH SENSITIVITY: 0.5 MG/L (ref 0–3)
TTG IGA SER IA-ACNC: 0.6 U/ML
TTG IGG SER IA-ACNC: < 0.6 U/ML

## 2025-08-30 LAB — ENDOMYSIUM IGA TITR SER IF: NORMAL {TITER}

## 2025-08-31 LAB — METHYLMALONATE SERPL-SCNC: NORMAL

## 2025-09-03 ENCOUNTER — TELEPHONE (OUTPATIENT)
Dept: ALLERGY | Age: 60
End: 2025-09-03

## (undated) DEVICE — SHEET, T, LAPAROTOMY, STERILE: Brand: MEDLINE

## (undated) DEVICE — STERILE COTTON BALLS LARGE 5/P: Brand: MEDLINE

## (undated) DEVICE — PREMIUM DRY TRAY LF: Brand: MEDLINE INDUSTRIES, INC.

## (undated) DEVICE — GLOVE ORANGE PI 7 1/2   MSG9075

## (undated) DEVICE — COVER,LIGHT HANDLE,FLX,2/PK: Brand: MEDLINE INDUSTRIES, INC.

## (undated) DEVICE — SUTURE VCRL + SZ 3-0 L27IN ABSRB UD L26MM SH 1/2 CIR VCP416H

## (undated) DEVICE — SUTURE VCRL SZ 3-0 L27IN ABSRB UD L26MM SH 1/2 CIR J416H

## (undated) DEVICE — SPONGE GZ W4XL4IN COT 12 PLY TYP VII WVN C FLD DSGN STERILE

## (undated) DEVICE — YANKAUER,BULB TIP,W/O VENT,RIGID,STERILE: Brand: MEDLINE

## (undated) DEVICE — GOWN,SIRUS,NONRNF,SETINSLV,XL,20/CS: Brand: MEDLINE

## (undated) DEVICE — HYPODERMIC SAFETY NEEDLE: Brand: MAGELLAN

## (undated) DEVICE — BASIC SINGLE BASIN BTC-LF: Brand: MEDLINE INDUSTRIES, INC.

## (undated) DEVICE — SYRINGE MED 10ML LUERLOCK TIP W/O SFTY DISP

## (undated) DEVICE — PAD,ABDOMINAL,5"X9",ST,LF,25/BX: Brand: MEDLINE INDUSTRIES, INC.

## (undated) DEVICE — SOLUTION IRRIG 1000ML STRL H2O USP PLAS POUR BTL

## (undated) DEVICE — SOLUTION SCRB 4OZ 7.5% POVIDONE IOD ANTIMIC BTL

## (undated) DEVICE — SUTURE VCRL + SZ 0 L27IN ABSRB UD CT-1 L36MM 1/2 CIR TAPR VCP260H

## (undated) DEVICE — SOLUTION PREP PAINT POV IOD FOR SKIN MUCOUS MEM

## (undated) DEVICE — BLADE,CARBON-STEEL,15,STRL,DISPOSABLE,TB: Brand: MEDLINE

## (undated) DEVICE — BREAST HERNIA: Brand: MEDLINE INDUSTRIES, INC.

## (undated) DEVICE — PENCIL SMK EVAC 15FT BLADE ELECTRD ROCKER F/TELSCP